# Patient Record
Sex: FEMALE | Race: OTHER | NOT HISPANIC OR LATINO
[De-identification: names, ages, dates, MRNs, and addresses within clinical notes are randomized per-mention and may not be internally consistent; named-entity substitution may affect disease eponyms.]

---

## 2019-05-01 PROBLEM — Z00.00 ENCOUNTER FOR PREVENTIVE HEALTH EXAMINATION: Status: ACTIVE | Noted: 2019-05-01

## 2019-05-31 ENCOUNTER — APPOINTMENT (OUTPATIENT)
Dept: NEUROLOGY | Facility: CLINIC | Age: 34
End: 2019-05-31
Payer: MEDICAID

## 2019-05-31 VITALS
BODY MASS INDEX: 31.82 KG/M2 | WEIGHT: 191 LBS | DIASTOLIC BLOOD PRESSURE: 80 MMHG | SYSTOLIC BLOOD PRESSURE: 130 MMHG | HEIGHT: 65 IN

## 2019-05-31 PROCEDURE — 99204 OFFICE O/P NEW MOD 45 MIN: CPT

## 2019-05-31 NOTE — HISTORY OF PRESENT ILLNESS
[FreeTextEntry1] : History and examination carried out with . Patient reports history of seizures going on for about the last year. She says she will bite her tounge and lose control of urine. She convulses. She has no recollection. Apparently she has had no workup and brings no records with her. She was put on Keppra thousand milligrams twice a day 3 months ago. She has had no further seizures except for a time when she ran out of the medication.No significant alcohol use. No drug use. Denies any other significant prior medical history.

## 2019-05-31 NOTE — CONSULT LETTER
[Dear  ___] : Dear  [unfilled], [Consult Letter:] : I had the pleasure of evaluating your patient, [unfilled]. [Please see my note below.] : Please see my note below. [Consult Closing:] : Thank you very much for allowing me to participate in the care of this patient.  If you have any questions, please do not hesitate to contact me. [Sincerely,] : Sincerely, [FreeTextEntry3] : Fco Louis MD, PhD, DPN-N\par [facility]\par Neurology [facility]\par Chief, Division of Neurology\par [facility]\par

## 2019-05-31 NOTE — REASON FOR VISIT
[Consultation] : a consultation visit [FreeTextEntry1] : Chief complaint: Seizures, Dr. Clare Salinas

## 2019-05-31 NOTE — PHYSICAL EXAM
[General Appearance - Alert] : alert [General Appearance - In No Acute Distress] : in no acute distress [General Appearance - Well-Appearing] : healthy appearing [Oriented To Time, Place, And Person] : oriented to person, place, and time [Impaired Insight] : insight and judgment were intact [Affect] : the affect was normal [Memory Recent] : recent memory was not impaired [Person] : oriented to person [Place] : oriented to place [Time] : oriented to time [Concentration Intact] : normal concentrating ability [Fluency] : fluency intact [Comprehension] : comprehension intact [Cranial Nerves Optic (II)] : visual acuity intact bilaterally,  visual fields full to confrontation, pupils equal round and reactive to light [Cranial Nerves Oculomotor (III)] : extraocular motion intact [Cranial Nerves Trigeminal (V)] : facial sensation intact symmetrically [Cranial Nerves Facial (VII)] : face symmetrical [Cranial Nerves Vestibulocochlear (VIII)] : hearing was intact bilaterally [Cranial Nerves Glossopharyngeal (IX)] : tongue and palate midline [Cranial Nerves Accessory (XI - Cranial And Spinal)] : head turning and shoulder shrug symmetric [Cranial Nerves Hypoglossal (XII)] : there was no tongue deviation with protrusion [Motor Tone] : muscle tone was normal in all four extremities [Motor Strength] : muscle strength was normal in all four extremities [No Muscle Atrophy] : normal bulk in all four extremities [Paresis Pronator Drift Right-Sided] : no pronator drift on the right [Paresis Pronator Drift Left-Sided] : no pronator drift on the left [Sensation Tactile Decrease] : light touch was intact [Sensation Pain / Temperature Decrease] : pain and temperature was intact [Romberg's Sign] : Romberg's sign was negtive [Abnormal Walk] : normal gait [Balance] : balance was intact [Past-pointing] : there was no past-pointing [Tremor] : no tremor present [Coordination - Dysmetria Impaired Finger-to-Nose Bilateral] : not present [Coordination - Dysmetria Impaired Heel-to-Shin Bilateral] : not present [2+] : Ankle jerk left 2+ [Plantar Reflex Right Only] : normal on the right [Plantar Reflex Left Only] : normal on the left [Edema] : there was no peripheral edema

## 2019-05-31 NOTE — ASSESSMENT
[FreeTextEntry1] : Seizure disorder. She's had no workup. We'll get brain MRI and an EEG. She will continue Keppra thousand milligrams twice a day. I have also told her to take daily folic acid, calcium and vitamin D. Followup in 6 weeks.

## 2019-06-11 ENCOUNTER — APPOINTMENT (OUTPATIENT)
Dept: NEUROLOGY | Facility: CLINIC | Age: 34
End: 2019-06-11
Payer: MEDICAID

## 2019-06-11 PROCEDURE — 93040 RHYTHM ECG WITH REPORT: CPT

## 2019-06-11 PROCEDURE — 95819 EEG AWAKE AND ASLEEP: CPT

## 2019-07-12 ENCOUNTER — APPOINTMENT (OUTPATIENT)
Dept: NEUROLOGY | Facility: CLINIC | Age: 34
End: 2019-07-12
Payer: MEDICAID

## 2019-07-12 VITALS
WEIGHT: 191 LBS | DIASTOLIC BLOOD PRESSURE: 80 MMHG | BODY MASS INDEX: 31.82 KG/M2 | SYSTOLIC BLOOD PRESSURE: 130 MMHG | HEIGHT: 65 IN

## 2019-07-12 PROCEDURE — 99214 OFFICE O/P EST MOD 30 MIN: CPT

## 2019-07-12 RX ORDER — LEVETIRACETAM 1000 MG/1
1000 TABLET, FILM COATED ORAL TWICE DAILY
Qty: 60 | Refills: 5 | Status: DISCONTINUED | COMMUNITY
Start: 2019-05-31 | End: 2019-07-12

## 2019-07-23 ENCOUNTER — RX RENEWAL (OUTPATIENT)
Age: 34
End: 2019-07-23

## 2019-08-23 ENCOUNTER — APPOINTMENT (OUTPATIENT)
Dept: NEUROLOGY | Facility: CLINIC | Age: 34
End: 2019-08-23
Payer: MEDICAID

## 2019-08-23 VITALS
DIASTOLIC BLOOD PRESSURE: 80 MMHG | HEIGHT: 65 IN | WEIGHT: 191 LBS | BODY MASS INDEX: 31.82 KG/M2 | SYSTOLIC BLOOD PRESSURE: 120 MMHG

## 2019-08-23 PROCEDURE — 99214 OFFICE O/P EST MOD 30 MIN: CPT

## 2019-08-29 ENCOUNTER — FORM ENCOUNTER (OUTPATIENT)
Age: 34
End: 2019-08-29

## 2019-08-30 ENCOUNTER — APPOINTMENT (OUTPATIENT)
Dept: MRI IMAGING | Facility: CLINIC | Age: 34
End: 2019-08-30
Payer: MEDICAID

## 2019-08-30 ENCOUNTER — OUTPATIENT (OUTPATIENT)
Dept: OUTPATIENT SERVICES | Facility: HOSPITAL | Age: 34
LOS: 1 days | End: 2019-08-30
Payer: MEDICAID

## 2019-08-30 DIAGNOSIS — R56.9 UNSPECIFIED CONVULSIONS: ICD-10-CM

## 2019-08-30 PROCEDURE — A9585: CPT

## 2019-08-30 PROCEDURE — 70553 MRI BRAIN STEM W/O & W/DYE: CPT

## 2019-08-30 PROCEDURE — 70553 MRI BRAIN STEM W/O & W/DYE: CPT | Mod: 26

## 2019-12-24 ENCOUNTER — EMERGENCY (EMERGENCY)
Facility: HOSPITAL | Age: 34
LOS: 1 days | Discharge: LEFT BEFORE DISCHARGE | End: 2019-12-24

## 2019-12-24 VITALS
WEIGHT: 164.91 LBS | HEIGHT: 64 IN | SYSTOLIC BLOOD PRESSURE: 125 MMHG | TEMPERATURE: 98 F | RESPIRATION RATE: 18 BRPM | DIASTOLIC BLOOD PRESSURE: 81 MMHG | OXYGEN SATURATION: 98 % | HEART RATE: 66 BPM

## 2019-12-24 NOTE — ED ADULT TRIAGE NOTE - HEIGHT IN CM
Patient called asking if NP Senia Treadwell will refill medication sertraline 100 mg that was previously being filled through [facility]. She states that the therapist left to practice else where and the psychiatrist is on medical leave and they dont know if he is coming back. Would like to have her sertraline filled through Senia Treadwell NP and per Senia Treadwell NP OK to do so. Requesting refill for sertraline 100 mg,  Last office visit: 8/8/18 with Senia Treadwell NP   Last ordered: 4/10/18 by outside provider. Refill set up for you to review and sign if appropriate.
Patient is requesting a call back from [facility] nurse. Patient denies symptoms. Further info was declined.
[phone]

## 2019-12-24 NOTE — ED ADULT TRIAGE NOTE - CHIEF COMPLAINT QUOTE
pt a+ox3, BIBA from [facility] c/o right senthil pain s/p fall this morning. pt reports visiting daughter at [facility], informed staff that right shoulder is bothering her and staff called 9-1-1. sling in place by staff at [facility].

## 2019-12-27 ENCOUNTER — APPOINTMENT (OUTPATIENT)
Dept: NEUROLOGY | Facility: CLINIC | Age: 34
End: 2019-12-27

## 2019-12-30 ENCOUNTER — APPOINTMENT (OUTPATIENT)
Dept: NEUROLOGY | Facility: CLINIC | Age: 34
End: 2019-12-30

## 2020-01-15 ENCOUNTER — APPOINTMENT (OUTPATIENT)
Dept: NEUROLOGY | Facility: CLINIC | Age: 35
End: 2020-01-15
Payer: MEDICAID

## 2020-01-15 VITALS
HEIGHT: 65 IN | SYSTOLIC BLOOD PRESSURE: 118 MMHG | WEIGHT: 192 LBS | DIASTOLIC BLOOD PRESSURE: 80 MMHG | BODY MASS INDEX: 31.99 KG/M2

## 2020-01-15 PROCEDURE — 99214 OFFICE O/P EST MOD 30 MIN: CPT

## 2020-02-19 ENCOUNTER — APPOINTMENT (OUTPATIENT)
Dept: NEUROLOGY | Facility: CLINIC | Age: 35
End: 2020-02-19
Payer: MEDICAID

## 2020-02-19 VITALS
HEIGHT: 65 IN | BODY MASS INDEX: 31.99 KG/M2 | DIASTOLIC BLOOD PRESSURE: 80 MMHG | SYSTOLIC BLOOD PRESSURE: 118 MMHG | WEIGHT: 192 LBS

## 2020-02-19 PROCEDURE — 99214 OFFICE O/P EST MOD 30 MIN: CPT

## 2020-02-19 RX ORDER — LEVETIRACETAM 750 MG/1
750 TABLET, FILM COATED ORAL TWICE DAILY
Qty: 120 | Refills: 5 | Status: DISCONTINUED | COMMUNITY
Start: 2019-07-12 | End: 2020-02-19

## 2020-03-25 ENCOUNTER — APPOINTMENT (OUTPATIENT)
Dept: NEUROLOGY | Facility: CLINIC | Age: 35
End: 2020-03-25
Payer: MEDICAID

## 2020-03-25 VITALS
SYSTOLIC BLOOD PRESSURE: 132 MMHG | WEIGHT: 192 LBS | HEIGHT: 65 IN | BODY MASS INDEX: 31.99 KG/M2 | DIASTOLIC BLOOD PRESSURE: 80 MMHG

## 2020-03-25 PROCEDURE — 99214 OFFICE O/P EST MOD 30 MIN: CPT

## 2020-08-31 ENCOUNTER — APPOINTMENT (OUTPATIENT)
Dept: NEUROLOGY | Facility: CLINIC | Age: 35
End: 2020-08-31
Payer: MEDICAID

## 2020-08-31 ENCOUNTER — RX RENEWAL (OUTPATIENT)
Age: 35
End: 2020-08-31

## 2020-08-31 VITALS
HEIGHT: 65 IN | DIASTOLIC BLOOD PRESSURE: 80 MMHG | BODY MASS INDEX: 35.16 KG/M2 | SYSTOLIC BLOOD PRESSURE: 130 MMHG | WEIGHT: 211 LBS | TEMPERATURE: 97.2 F

## 2020-08-31 DIAGNOSIS — R20.2 PARESTHESIA OF SKIN: ICD-10-CM

## 2020-08-31 PROCEDURE — 99215 OFFICE O/P EST HI 40 MIN: CPT

## 2020-10-23 ENCOUNTER — APPOINTMENT (OUTPATIENT)
Dept: NEUROLOGY | Facility: CLINIC | Age: 35
End: 2020-10-23
Payer: MEDICAID

## 2020-10-23 PROCEDURE — 95912 NRV CNDJ TEST 11-12 STUDIES: CPT

## 2020-10-23 PROCEDURE — 99072 ADDL SUPL MATRL&STAF TM PHE: CPT

## 2020-10-23 PROCEDURE — 95886 MUSC TEST DONE W/N TEST COMP: CPT

## 2021-03-01 ENCOUNTER — APPOINTMENT (OUTPATIENT)
Dept: NEUROLOGY | Facility: CLINIC | Age: 36
End: 2021-03-01

## 2021-12-22 ENCOUNTER — APPOINTMENT (OUTPATIENT)
Dept: UROLOGY | Facility: CLINIC | Age: 36
End: 2021-12-22

## 2023-05-11 ENCOUNTER — APPOINTMENT (OUTPATIENT)
Dept: UROLOGY | Facility: CLINIC | Age: 38
End: 2023-05-11
Payer: MEDICAID

## 2023-05-11 VITALS
SYSTOLIC BLOOD PRESSURE: 136 MMHG | WEIGHT: 266 LBS | HEIGHT: 65 IN | DIASTOLIC BLOOD PRESSURE: 92 MMHG | HEART RATE: 78 BPM | BODY MASS INDEX: 44.32 KG/M2

## 2023-05-11 DIAGNOSIS — R30.0 DYSURIA: ICD-10-CM

## 2023-05-11 DIAGNOSIS — Z87.42 PERSONAL HISTORY OF OTHER DISEASES OF THE FEMALE GENITAL TRACT: ICD-10-CM

## 2023-05-11 DIAGNOSIS — Z63.5 DISRUPTION OF FAMILY BY SEPARATION AND DIVORCE: ICD-10-CM

## 2023-05-11 DIAGNOSIS — K38.9 DISEASE OF APPENDIX, UNSPECIFIED: ICD-10-CM

## 2023-05-11 PROCEDURE — 99204 OFFICE O/P NEW MOD 45 MIN: CPT

## 2023-05-11 SDOH — SOCIAL STABILITY - SOCIAL INSECURITY: DISRUPTION OF FAMILY BY SEPARATION AND DIVORCE: Z63.5

## 2023-05-11 NOTE — HISTORY OF PRESENT ILLNESS
[FreeTextEntry1] : 36yo F hx of seizure presents with findings of 1.7cm sized upper pole stone in left kidney seen in CT w/ IV cont\par Pt went to hospital with complaints of dysuria, bilateral low back pain, fever, nausea and vomiting. \par She finished course of cefpodoxime x 1week today\par Ucx returned neg\par She currently still reports dysuria and nausea. Fever and pain resolved\par

## 2023-05-11 NOTE — LETTER BODY
[Dear  ___] : Dear  [unfilled], [Consult Letter:] : I had the pleasure of evaluating your patient, [unfilled]. [Please see my note below.] : Please see my note below. [Consult Closing:] : Thank you very much for allowing me to participate in the care of this patient.  If you have any questions, please do not hesitate to contact me. [Sincerely,] : Sincerely, [FreeTextEntry3] : Mendoza Doyle, \par Genitourinary Medicine\par [facility]\par

## 2023-05-11 NOTE — ASSESSMENT
[FreeTextEntry1] : possible pyelonephritis:\par prescribed another week of cefpodoxime \par return in 3 weeks. If symptoms persists, will consider cysto\par \par Kidney stone:\par will evaluate with KUB and CT \par f/u 3 weeks
Breath sounds clear and equal bilaterally.

## 2023-05-11 NOTE — PHYSICAL EXAM
[General Appearance - Well Developed] : well developed [General Appearance - Well Nourished] : well nourished [Normal Appearance] : normal appearance [Well Groomed] : well groomed [General Appearance - In No Acute Distress] : no acute distress [Edema] : no peripheral edema [] : no respiratory distress [Respiration, Rhythm And Depth] : normal respiratory rhythm and effort [Exaggerated Use Of Accessory Muscles For Inspiration] : no accessory muscle use [Normal Station and Gait] : the gait and station were normal for the patient's age [No Focal Deficits] : no focal deficits [Oriented To Time, Place, And Person] : oriented to person, place, and time [Affect] : the affect was normal [Mood] : the mood was normal [Not Anxious] : not anxious

## 2023-05-14 LAB — BACTERIA UR CULT: NORMAL

## 2023-06-03 ENCOUNTER — APPOINTMENT (OUTPATIENT)
Dept: CT IMAGING | Facility: CLINIC | Age: 38
End: 2023-06-03
Payer: MEDICAID

## 2023-06-03 ENCOUNTER — APPOINTMENT (OUTPATIENT)
Dept: RADIOLOGY | Facility: CLINIC | Age: 38
End: 2023-06-03
Payer: MEDICAID

## 2023-06-03 ENCOUNTER — OUTPATIENT (OUTPATIENT)
Dept: OUTPATIENT SERVICES | Facility: HOSPITAL | Age: 38
LOS: 1 days | End: 2023-06-03
Payer: MEDICAID

## 2023-06-03 DIAGNOSIS — N20.0 CALCULUS OF KIDNEY: ICD-10-CM

## 2023-06-03 PROCEDURE — 74018 RADEX ABDOMEN 1 VIEW: CPT

## 2023-06-03 PROCEDURE — 74176 CT ABD & PELVIS W/O CONTRAST: CPT

## 2023-06-03 PROCEDURE — 74018 RADEX ABDOMEN 1 VIEW: CPT | Mod: 26

## 2023-06-03 PROCEDURE — 74176 CT ABD & PELVIS W/O CONTRAST: CPT | Mod: 26

## 2023-06-05 ENCOUNTER — APPOINTMENT (OUTPATIENT)
Dept: UROLOGY | Facility: CLINIC | Age: 38
End: 2023-06-05

## 2023-06-08 ENCOUNTER — APPOINTMENT (OUTPATIENT)
Dept: UROLOGY | Facility: CLINIC | Age: 38
End: 2023-06-08
Payer: MEDICAID

## 2023-06-08 VITALS
DIASTOLIC BLOOD PRESSURE: 82 MMHG | HEART RATE: 82 BPM | HEIGHT: 65 IN | BODY MASS INDEX: 43.65 KG/M2 | SYSTOLIC BLOOD PRESSURE: 137 MMHG | WEIGHT: 262 LBS

## 2023-06-08 PROCEDURE — 99213 OFFICE O/P EST LOW 20 MIN: CPT

## 2023-06-08 NOTE — HISTORY OF PRESENT ILLNESS
[FreeTextEntry1] : 36yo F hx of seizure presents for f/u\par CT showed b/l staghorn calculus L>R\par She also finished course of abx for possible pyelonephritis. \par Ucx returned neg\par She still reports left flank pain and occasional vomiting. \par

## 2023-06-08 NOTE — ASSESSMENT
[FreeTextEntry1] : Staghorn Calculus:\par Left flank pain. \par will refer to Dr. Perkins for PCNL\par

## 2023-06-14 ENCOUNTER — APPOINTMENT (OUTPATIENT)
Dept: UROLOGY | Facility: CLINIC | Age: 38
End: 2023-06-14
Payer: MEDICAID

## 2023-06-14 PROCEDURE — 99215 OFFICE O/P EST HI 40 MIN: CPT

## 2023-06-15 NOTE — HISTORY OF PRESENT ILLNESS
[FreeTextEntry1] : 36 yo F for follow up\par saw Dr. Doyle\par interview performed with [place] interpretation \par \par generally healthy\par no previous history of nephrolithiasis\par recently started having some left flank pain intermittently\par reviewed last CT with the patient:\par - full staghorn in the left kidney with hydronephrosis in the upper calyx\par - large volume stone in the right kidney filling two calyces in the upper pole, no hydronephrosis\par \par discussed the findings, explained the mechanism of pain and that it may be from the upper calyx that is obstructed. will refrain from insertion of a nephrostomy tube for now, unless the pain becomes worse or the patient has fevers.\par \par discussed bilateral PCNL with the patient\par also discussed the need for multi-tract procedure on the left side and the possibility for a multi-stage procedure on the left.\par Procedure, in hospital stay and recovery discussed with the patient. Potential complications of bleeding, transfusion, selective angioembolization if indicated, adjacent organ injury, fever, sepsis, infection, incomplete stone clearance, bowel or other unusual injury. chest complications, vascular injuries, procedures needed to address any complications, ureteral injury, anesthetic complications, all discussed.\par \par Printed information including all of the above and more provided to the patient. \par \par plan:\par - left staged PCNL\par - right mini-PCNL

## 2023-06-15 NOTE — HISTORY OF PRESENT ILLNESS
[FreeTextEntry1] : 36 yo F for follow up\par saw Dr. Doyle\par interview performed with [place] interpretation \par \par generally healthy\par no previous history of nephrolithiasis\par recently started having some left flank pain intermittently\par reviewed last CT with the patient:\par - full staghorn in the left kidney with hydronephrosis in the upper calyx\par - large volume stone in the right kidney filling two calyces in the upper pole, no hydronephrosis\par \par discussed the findings, explained the mechanism of pain and that it may be from the upper calyx that is obstructed. will refrain from insertion of a nephrostomy tube for now, unless the pain becomes worse or the patient has fevers.\par \par discussed bilateral PCNL with the patient\par also discussed the need for multi-tract procedure on the left side and the possibility for a multi-stage procedure on the left.\par Procedure, in hospital stay and recovery discussed with the patient. Potential complications of bleeding, transfusion, selective angioembolization if indicated, adjacent organ injury, fever, sepsis, infection, incomplete stone clearance, bowel or other unusual injury. chest complications, vascular injuries, procedures needed to address any complications, ureteral injury, anesthetic complications, all discussed.\par \par Printed information including all of the above and more provided to the patient. \par \par plan:\par - left staged PCNL\par - right mini-PCNL

## 2023-06-16 ENCOUNTER — NON-APPOINTMENT (OUTPATIENT)
Age: 38
End: 2023-06-16

## 2023-07-12 ENCOUNTER — OUTPATIENT (OUTPATIENT)
Dept: OUTPATIENT SERVICES | Facility: HOSPITAL | Age: 38
LOS: 1 days | End: 2023-07-12
Payer: COMMERCIAL

## 2023-07-12 VITALS
WEIGHT: 270.95 LBS | OXYGEN SATURATION: 98 % | HEIGHT: 62.5 IN | RESPIRATION RATE: 20 BRPM | DIASTOLIC BLOOD PRESSURE: 100 MMHG | TEMPERATURE: 97 F | SYSTOLIC BLOOD PRESSURE: 140 MMHG | HEART RATE: 77 BPM

## 2023-07-12 DIAGNOSIS — Z29.9 ENCOUNTER FOR PROPHYLACTIC MEASURES, UNSPECIFIED: ICD-10-CM

## 2023-07-12 DIAGNOSIS — N20.0 CALCULUS OF KIDNEY: ICD-10-CM

## 2023-07-12 DIAGNOSIS — R56.9 UNSPECIFIED CONVULSIONS: ICD-10-CM

## 2023-07-12 DIAGNOSIS — Z01.818 ENCOUNTER FOR OTHER PREPROCEDURAL EXAMINATION: ICD-10-CM

## 2023-07-12 DIAGNOSIS — Z90.49 ACQUIRED ABSENCE OF OTHER SPECIFIED PARTS OF DIGESTIVE TRACT: Chronic | ICD-10-CM

## 2023-07-12 DIAGNOSIS — Z98.890 OTHER SPECIFIED POSTPROCEDURAL STATES: Chronic | ICD-10-CM

## 2023-07-12 DIAGNOSIS — Z13.89 ENCOUNTER FOR SCREENING FOR OTHER DISORDER: ICD-10-CM

## 2023-07-12 LAB
A1C WITH ESTIMATED AVERAGE GLUCOSE RESULT: 6 % — HIGH (ref 4–5.6)
ANION GAP SERPL CALC-SCNC: 16 MMOL/L — SIGNIFICANT CHANGE UP (ref 5–17)
APPEARANCE UR: ABNORMAL
APTT BLD: 33.2 SEC — SIGNIFICANT CHANGE UP (ref 27.5–35.5)
BACTERIA # UR AUTO: ABNORMAL
BASOPHILS # BLD AUTO: 0.05 K/UL — SIGNIFICANT CHANGE UP (ref 0–0.2)
BASOPHILS NFR BLD AUTO: 0.5 % — SIGNIFICANT CHANGE UP (ref 0–2)
BILIRUB UR-MCNC: NEGATIVE — SIGNIFICANT CHANGE UP
BLD GP AB SCN SERPL QL: SIGNIFICANT CHANGE UP
BUN SERPL-MCNC: 12.6 MG/DL — SIGNIFICANT CHANGE UP (ref 8–20)
CALCIUM SERPL-MCNC: 8.9 MG/DL — SIGNIFICANT CHANGE UP (ref 8.4–10.5)
CHLORIDE SERPL-SCNC: 102 MMOL/L — SIGNIFICANT CHANGE UP (ref 96–108)
CO2 SERPL-SCNC: 20 MMOL/L — LOW (ref 22–29)
COLOR SPEC: ABNORMAL
CREAT SERPL-MCNC: 0.87 MG/DL — SIGNIFICANT CHANGE UP (ref 0.5–1.3)
DIFF PNL FLD: ABNORMAL
EGFR: 87 ML/MIN/1.73M2 — SIGNIFICANT CHANGE UP
EOSINOPHIL # BLD AUTO: 0.3 K/UL — SIGNIFICANT CHANGE UP (ref 0–0.5)
EOSINOPHIL NFR BLD AUTO: 3.3 % — SIGNIFICANT CHANGE UP (ref 0–6)
EPI CELLS # UR: SIGNIFICANT CHANGE UP
ESTIMATED AVERAGE GLUCOSE: 126 MG/DL — HIGH (ref 68–114)
GLUCOSE SERPL-MCNC: 100 MG/DL — HIGH (ref 70–99)
GLUCOSE UR QL: NEGATIVE MG/DL — SIGNIFICANT CHANGE UP
GRAN CASTS # UR COMP ASSIST: ABNORMAL /LPF
HCG SERPL-ACNC: <4 MIU/ML — SIGNIFICANT CHANGE UP
HCT VFR BLD CALC: 43.6 % — SIGNIFICANT CHANGE UP (ref 34.5–45)
HGB BLD-MCNC: 14.4 G/DL — SIGNIFICANT CHANGE UP (ref 11.5–15.5)
HYALINE CASTS # UR AUTO: ABNORMAL /LPF
IMM GRANULOCYTES NFR BLD AUTO: 1 % — HIGH (ref 0–0.9)
INR BLD: 0.95 RATIO — SIGNIFICANT CHANGE UP (ref 0.88–1.16)
KETONES UR-MCNC: NEGATIVE — SIGNIFICANT CHANGE UP
LEUKOCYTE ESTERASE UR-ACNC: ABNORMAL
LYMPHOCYTES # BLD AUTO: 3.09 K/UL — SIGNIFICANT CHANGE UP (ref 1–3.3)
LYMPHOCYTES # BLD AUTO: 33.6 % — SIGNIFICANT CHANGE UP (ref 13–44)
MCHC RBC-ENTMCNC: 30.1 PG — SIGNIFICANT CHANGE UP (ref 27–34)
MCHC RBC-ENTMCNC: 33 GM/DL — SIGNIFICANT CHANGE UP (ref 32–36)
MCV RBC AUTO: 91.2 FL — SIGNIFICANT CHANGE UP (ref 80–100)
MONOCYTES # BLD AUTO: 0.41 K/UL — SIGNIFICANT CHANGE UP (ref 0–0.9)
MONOCYTES NFR BLD AUTO: 4.5 % — SIGNIFICANT CHANGE UP (ref 2–14)
NEUTROPHILS # BLD AUTO: 5.26 K/UL — SIGNIFICANT CHANGE UP (ref 1.8–7.4)
NEUTROPHILS NFR BLD AUTO: 57.1 % — SIGNIFICANT CHANGE UP (ref 43–77)
NITRITE UR-MCNC: NEGATIVE — SIGNIFICANT CHANGE UP
PH UR: 6 — SIGNIFICANT CHANGE UP (ref 5–8)
PLATELET # BLD AUTO: 345 K/UL — SIGNIFICANT CHANGE UP (ref 150–400)
POTASSIUM SERPL-MCNC: 4.3 MMOL/L — SIGNIFICANT CHANGE UP (ref 3.5–5.3)
POTASSIUM SERPL-SCNC: 4.3 MMOL/L — SIGNIFICANT CHANGE UP (ref 3.5–5.3)
PROT UR-MCNC: 30 MG/DL
PROTHROM AB SERPL-ACNC: 11 SEC — SIGNIFICANT CHANGE UP (ref 10.5–13.4)
RBC # BLD: 4.78 M/UL — SIGNIFICANT CHANGE UP (ref 3.8–5.2)
RBC # FLD: 13.7 % — SIGNIFICANT CHANGE UP (ref 10.3–14.5)
RBC CASTS # UR COMP ASSIST: >50 /HPF (ref 0–4)
SODIUM SERPL-SCNC: 138 MMOL/L — SIGNIFICANT CHANGE UP (ref 135–145)
SP GR SPEC: 1.01 — SIGNIFICANT CHANGE UP (ref 1.01–1.02)
T3 SERPL-MCNC: 127 NG/DL — SIGNIFICANT CHANGE UP (ref 80–200)
T4 AB SER-ACNC: 7.4 UG/DL — SIGNIFICANT CHANGE UP (ref 4.5–12)
TSH SERPL-MCNC: 4.26 UIU/ML — HIGH (ref 0.27–4.2)
UROBILINOGEN FLD QL: NEGATIVE MG/DL — SIGNIFICANT CHANGE UP
WBC # BLD: 9.2 K/UL — SIGNIFICANT CHANGE UP (ref 3.8–10.5)
WBC # FLD AUTO: 9.2 K/UL — SIGNIFICANT CHANGE UP (ref 3.8–10.5)
WBC UR QL: ABNORMAL /HPF (ref 0–5)

## 2023-07-12 PROCEDURE — 93005 ELECTROCARDIOGRAM TRACING: CPT

## 2023-07-12 PROCEDURE — 93010 ELECTROCARDIOGRAM REPORT: CPT

## 2023-07-12 PROCEDURE — G0463: CPT

## 2023-07-12 RX ORDER — CEFAZOLIN SODIUM 1 G
3000 VIAL (EA) INJECTION ONCE
Refills: 0 | Status: COMPLETED | OUTPATIENT
Start: 2023-08-01 | End: 2023-08-01

## 2023-07-12 RX ORDER — CEFAZOLIN SODIUM 1 G
3000 VIAL (EA) INJECTION ONCE
Refills: 0 | Status: COMPLETED | OUTPATIENT
Start: 2023-07-25 | End: 2023-07-25

## 2023-07-12 RX ORDER — ACETAMINOPHEN 500 MG
975 TABLET ORAL ONCE
Refills: 0 | Status: COMPLETED | OUTPATIENT
Start: 2023-07-25 | End: 2023-07-25

## 2023-07-12 RX ORDER — ACETAMINOPHEN 500 MG
975 TABLET ORAL ONCE
Refills: 0 | Status: COMPLETED | OUTPATIENT
Start: 2023-08-01 | End: 2023-08-01

## 2023-07-12 NOTE — H&P PST ADULT - ASSESSMENT
36 yo F PMH of seizures (on keppra and topiramate), presents with c/o kidney stones. Denies previous history of nephrolithiasis. Recently started having some left flank pain intermittently. 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    Patient was educated on preop preparation with written and verbal instructions. Pt was informed to obtain clearances >3 days before surgery. Pt was educated on NSAIDs, multivitamins and herbals that increase the risk of bleeding and need to be stopped 7 days before procedure. Pt verbalized understanding of the above.  38 yo F PMH of seizures (on keppra and topiramate), presents with c/o kidney stones. Denies previous history of nephrolithiasis. Recently started having some left flank pain intermittently. CT showed full staghorn in the left kidney with hydronephrosis in the upper calyx, large volume stone in the right kidney filling two calyces in the upper pole, no hydronephrosis. Preop assessment prior to left staged PCNL 2023, right mini-PCNL 2023 w/Dr Perkins    Patient was educated on preop preparation with written and verbal instructions. Pt was informed to obtain clearances >3 days before surgery. Pt was educated on NSAIDs, multivitamins and herbals that increase the risk of bleeding and need to be stopped 7 days before procedure. Pt verbalized understanding of the above.     OPIOID RISK TOOL    XAVIER EACH BOX THAT APPLIES AND ADD TOTALS AT THE END    FAMILY HISTORY OF SUBSTANCE ABUSE                 FEMALE         MALE                                                Alcohol                             [  ]1 pt          [  ]3pts                                               Illegal Durgs                     [  ]2 pts        [  ]3pts                                               Rx Drugs                           [  ]4 pts        [  ]4 pts    PERSONAL HISTORY OF SUBSTANCE ABUSE                                                                                          Alcohol                             [  ]3 pts       [  ]3 pts                                               Illegal Drugs                     [  ]4 pts        [  ]4 pts                                               Rx Drugs                           [  ]5 pts        [  ]5 pts    AGE BETWEEN 16-45 YEARS                                      [ x ]1 pt         [  ]1 pt    HISTORY OF PREADOLESCENT   SEXUAL ABUSE                                                             [  ]3 pts        [  ]0pts    PSYCHOLOGICAL DISEASE                     ADD, OCD, Bipolar, Schizophrenia        [  ]2 pts         [  ]2 pts                      Depression                                               [  ]1 pt           [  ]1 pt           SCORING TOTAL   (add numbers and type here)              ( 1 )                                     A score of 3 or lower indicated LOW risk for future opioid abuse  A score of 4 to 7 indicated moderate risk for future opioid abuse  A score of 8 or higher indicates a high risk for opioid abuse    CAPRINI VTE 2.0 SCORE [CLOT updated 2019]    AGE RELATED RISK FACTORS                                                       MOBILITY RELATED FACTORS  [ ] Age 41-60 years                                            (1 Point)                    [ ] Bed rest                                                        (1 Point)  [ ] Age: 61-74 years                                           (2 Points)                  [ ] Plaster cast                                                   (2 Points)  [ ] Age= 75 years                                              (3 Points)                    [ ] Bed bound for more than 72 hours                 (2 Points)    DISEASE RELATED RISK FACTORS                                               GENDER SPECIFIC FACTORS  [ ] Edema in the lower extremities                       (1 Point)              [ ] Pregnancy                                                     (1 Point)  [ ] Varicose veins                                               (1 Point)                     [ ] Post-partum < 6 weeks                                   (1 Point)             [ x] BMI > 25 Kg/m2                                            (1 Point)                     [ ] Hormonal therapy  or oral contraception          (1 Point)                 [ ] Sepsis (in the previous month)                        (1 Point)               [ ] History of pregnancy complications                 (1 point)  [ ] Pneumonia or serious lung disease                                               [ ] Unexplained or recurrent                     (1 Point)           (in the previous month)                               (1 Point)  [ ] Abnormal pulmonary function test                     (1 Point)                 SURGERY RELATED RISK FACTORS  [ ] Acute myocardial infarction                              (1 Point)               [ ]  Section                                             (1 Point)  [ ] Congestive heart failure (in the previous month)  (1 Point)      [ ] Minor surgery                                                  (1 Point)   [ ] Inflammatory bowel disease                             (1 Point)               [ ] Arthroscopic surgery                                        (2 Points)  [ ] Central venous access                                      (2 Points)                [ x] General surgery lasting more than 45 minutes (2 points)  [ ] Malignancy- Present or previous                   (2 Points)                [ ] Elective arthroplasty                                         (5 points)    [ ] Stroke (in the previous month)                          (5 Points)                                                                                                                                                           HEMATOLOGY RELATED FACTORS                                                 TRAUMA RELATED RISK FACTORS  [ ] Prior episodes of VTE                                     (3 Points)                [ ] Fracture of the hip, pelvis, or leg                       (5 Points)  [ ] Positive family history for VTE                         (3 Points)             [ ] Acute spinal cord injury (in the previous month)  (5 Points)  [ ] Prothrombin 79852 A                                     (3 Points)               [ ] Paralysis  (less than 1 month)                             (5 Points)  [ ] Factor V Leiden                                             (3 Points)                  [ ] Multiple Trauma within 1 month                        (5 Points)  [ ] Lupus anticoagulants                                     (3 Points)                                                           [ ] Anticardiolipin antibodies                               (3 Points)                                                       [ ] High homocysteine in the blood                      (3 Points)                                             [ ] Other congenital or acquired thrombophilia      (3 Points)                                                [ ] Heparin induced thrombocytopenia                  (3 Points)                                     Total Score [     3     ] 38 yo F PMH of hypothyroid, seizures, obesity, presents with c/o kidney stones. Denies previous history of nephrolithiasis. Recently started having some left flank pain intermittently. CT showed full staghorn in the left kidney with hydronephrosis in the upper calyx, large volume stone in the right kidney filling two calyces in the upper pole, no hydronephrosis. Preop assessment prior to left staged PCNL 2023, right mini-PCNL 2023 w/Dr Perkins    Patient was educated on preop preparation with written and verbal instructions. Pt was informed to obtain clearances >3 days before surgery. Pt was educated on NSAIDs, multivitamins and herbals that increase the risk of bleeding and need to be stopped 7 days before procedure. Pt verbalized understanding of the above.     OPIOID RISK TOOL    XAVIER EACH BOX THAT APPLIES AND ADD TOTALS AT THE END    FAMILY HISTORY OF SUBSTANCE ABUSE                 FEMALE         MALE                                                Alcohol                             [  ]1 pt          [  ]3pts                                               Illegal Durgs                     [  ]2 pts        [  ]3pts                                               Rx Drugs                           [  ]4 pts        [  ]4 pts    PERSONAL HISTORY OF SUBSTANCE ABUSE                                                                                          Alcohol                             [  ]3 pts       [  ]3 pts                                               Illegal Drugs                     [  ]4 pts        [  ]4 pts                                               Rx Drugs                           [  ]5 pts        [  ]5 pts    AGE BETWEEN 16-45 YEARS                                      [ x ]1 pt         [  ]1 pt    HISTORY OF PREADOLESCENT   SEXUAL ABUSE                                                             [  ]3 pts        [  ]0pts    PSYCHOLOGICAL DISEASE                     ADD, OCD, Bipolar, Schizophrenia        [  ]2 pts         [  ]2 pts                      Depression                                               [  ]1 pt           [  ]1 pt           SCORING TOTAL   (add numbers and type here)              ( 1 )                                     A score of 3 or lower indicated LOW risk for future opioid abuse  A score of 4 to 7 indicated moderate risk for future opioid abuse  A score of 8 or higher indicates a high risk for opioid abuse    CAPRINI VTE 2.0 SCORE [CLOT updated 2019]    AGE RELATED RISK FACTORS                                                       MOBILITY RELATED FACTORS  [ ] Age 41-60 years                                            (1 Point)                    [ ] Bed rest                                                        (1 Point)  [ ] Age: 61-74 years                                           (2 Points)                  [ ] Plaster cast                                                   (2 Points)  [ ] Age= 75 years                                              (3 Points)                    [ ] Bed bound for more than 72 hours                 (2 Points)    DISEASE RELATED RISK FACTORS                                               GENDER SPECIFIC FACTORS  [ ] Edema in the lower extremities                       (1 Point)              [ ] Pregnancy                                                     (1 Point)  [ ] Varicose veins                                               (1 Point)                     [ ] Post-partum < 6 weeks                                   (1 Point)             [ x] BMI > 25 Kg/m2                                            (1 Point)                     [ ] Hormonal therapy  or oral contraception          (1 Point)                 [ ] Sepsis (in the previous month)                        (1 Point)               [ ] History of pregnancy complications                 (1 point)  [ ] Pneumonia or serious lung disease                                               [ ] Unexplained or recurrent                     (1 Point)           (in the previous month)                               (1 Point)  [ ] Abnormal pulmonary function test                     (1 Point)                 SURGERY RELATED RISK FACTORS  [ ] Acute myocardial infarction                              (1 Point)               [ ]  Section                                             (1 Point)  [ ] Congestive heart failure (in the previous month)  (1 Point)      [ ] Minor surgery                                                  (1 Point)   [ ] Inflammatory bowel disease                             (1 Point)               [ ] Arthroscopic surgery                                        (2 Points)  [ ] Central venous access                                      (2 Points)                [ x] General surgery lasting more than 45 minutes (2 points)  [ ] Malignancy- Present or previous                   (2 Points)                [ ] Elective arthroplasty                                         (5 points)    [ ] Stroke (in the previous month)                          (5 Points)                                                                                                                                                           HEMATOLOGY RELATED FACTORS                                                 TRAUMA RELATED RISK FACTORS  [ ] Prior episodes of VTE                                     (3 Points)                [ ] Fracture of the hip, pelvis, or leg                       (5 Points)  [ ] Positive family history for VTE                         (3 Points)             [ ] Acute spinal cord injury (in the previous month)  (5 Points)  [ ] Prothrombin 30228 A                                     (3 Points)               [ ] Paralysis  (less than 1 month)                             (5 Points)  [ ] Factor V Leiden                                             (3 Points)                  [ ] Multiple Trauma within 1 month                        (5 Points)  [ ] Lupus anticoagulants                                     (3 Points)                                                           [ ] Anticardiolipin antibodies                               (3 Points)                                                       [ ] High homocysteine in the blood                      (3 Points)                                             [ ] Other congenital or acquired thrombophilia      (3 Points)                                                [ ] Heparin induced thrombocytopenia                  (3 Points)                                     Total Score [     3     ] 36 yo F PMH of hypothyroid, seizures (last seizure 2 yrs ago, currently not on any anti-seizure meds), obesity, presents with c/o kidney stones. Denies previous history of nephrolithiasis. Recently started having some left flank pain intermittently. CT showed full staghorn in the left kidney with hydronephrosis in the upper calyx, large volume stone in the right kidney filling two calyces in the upper pole, no hydronephrosis. Preop assessment prior to left PCNL 1st stage 2023, left PCNL 2nd stage 2023 w/Dr Perkins    Patient was educated on preop preparation with written and verbal instructions. Pt was informed to obtain clearance >3 days before surgery. Pt was educated on NSAIDs, multivitamins and herbals that increase the risk of bleeding and need to be stopped 7 days before procedure. Pt verbalized understanding of the above.     OPIOID RISK TOOL    XAVIER EACH BOX THAT APPLIES AND ADD TOTALS AT THE END    FAMILY HISTORY OF SUBSTANCE ABUSE                 FEMALE         MALE                                                Alcohol                             [  ]1 pt          [  ]3pts                                               Illegal Drugs                     [  ]2 pts        [  ]3pts                                               Rx Drugs                           [  ]4 pts        [  ]4 pts    PERSONAL HISTORY OF SUBSTANCE ABUSE                                                                                          Alcohol                             [  ]3 pts       [  ]3 pts                                               Illegal Drugs                     [  ]4 pts        [  ]4 pts                                               Rx Drugs                           [  ]5 pts        [  ]5 pts    AGE BETWEEN 16-45 YEARS                                      [ x ]1 pt         [  ]1 pt    HISTORY OF PREADOLESCENT   SEXUAL ABUSE                                                             [  ]3 pts        [  ]0pts    PSYCHOLOGICAL DISEASE                     ADD, OCD, Bipolar, Schizophrenia        [  ]2 pts         [  ]2 pts                      Depression                                               [  ]1 pt           [  ]1 pt           SCORING TOTAL   (add numbers and type here)              ( 1 )                                     A score of 3 or lower indicated LOW risk for future opioid abuse  A score of 4 to 7 indicated moderate risk for future opioid abuse  A score of 8 or higher indicates a high risk for opioid abuse    CAPRINI VTE 2.0 SCORE [CLOT updated 2019]    AGE RELATED RISK FACTORS                                                       MOBILITY RELATED FACTORS  [ ] Age 41-60 years                                            (1 Point)                    [ ] Bed rest                                                        (1 Point)  [ ] Age: 61-74 years                                           (2 Points)                  [ ] Plaster cast                                                   (2 Points)  [ ] Age= 75 years                                              (3 Points)                    [ ] Bed bound for more than 72 hours                 (2 Points)    DISEASE RELATED RISK FACTORS                                               GENDER SPECIFIC FACTORS  [ ] Edema in the lower extremities                       (1 Point)              [ ] Pregnancy                                                     (1 Point)  [ ] Varicose veins                                               (1 Point)                     [ ] Post-partum < 6 weeks                                   (1 Point)             [ x] BMI > 25 Kg/m2                                            (1 Point)                     [ ] Hormonal therapy  or oral contraception          (1 Point)                 [ ] Sepsis (in the previous month)                        (1 Point)               [ ] History of pregnancy complications                 (1 point)  [ ] Pneumonia or serious lung disease                                               [ ] Unexplained or recurrent                     (1 Point)           (in the previous month)                               (1 Point)  [ ] Abnormal pulmonary function test                     (1 Point)                 SURGERY RELATED RISK FACTORS  [ ] Acute myocardial infarction                              (1 Point)               [ ]  Section                                             (1 Point)  [ ] Congestive heart failure (in the previous month)  (1 Point)      [ ] Minor surgery                                                  (1 Point)   [ ] Inflammatory bowel disease                             (1 Point)               [ ] Arthroscopic surgery                                        (2 Points)  [ ] Central venous access                                      (2 Points)                [ x] General surgery lasting more than 45 minutes (2 points)  [ ] Malignancy- Present or previous                   (2 Points)                [ ] Elective arthroplasty                                         (5 points)    [ ] Stroke (in the previous month)                          (5 Points)                                                                                                                                                           HEMATOLOGY RELATED FACTORS                                                 TRAUMA RELATED RISK FACTORS  [ ] Prior episodes of VTE                                     (3 Points)                [ ] Fracture of the hip, pelvis, or leg                       (5 Points)  [ ] Positive family history for VTE                         (3 Points)             [ ] Acute spinal cord injury (in the previous month)  (5 Points)  [ ] Prothrombin 41365 A                                     (3 Points)               [ ] Paralysis  (less than 1 month)                             (5 Points)  [ ] Factor V Leiden                                             (3 Points)                  [ ] Multiple Trauma within 1 month                        (5 Points)  [ ] Lupus anticoagulants                                     (3 Points)                                                           [ ] Anticardiolipin antibodies                               (3 Points)                                                       [ ] High homocysteine in the blood                      (3 Points)                                             [ ] Other congenital or acquired thrombophilia      (3 Points)                                                [ ] Heparin induced thrombocytopenia                  (3 Points)                                     Total Score [     3     ] 38 yo F PMH of hypothyroid, DM2, seizures (last seizure 2 yrs ago, currently not on any anti-seizure meds), obesity, presents with c/o kidney stones. Denies previous history of nephrolithiasis. Pt reports she recently started having left flank pain that radiates to LUQ. She denies fevers, chills, nausea, vomiting, or any bowel or bladder issues. CT showed full staghorn in the left kidney with hydronephrosis in the upper calyx, large volume stone in the right kidney filling two calyces in the upper pole, no hydronephrosis. Preop assessment prior to left PCNL 1st stage 2023, left PCNL 2nd stage 2023 w/Dr Perkins    Patient was educated on preop preparation with written and verbal instructions. Pt was informed to obtain clearance >3 days before surgery. Pt was educated on NSAIDs, multivitamins and herbals that increase the risk of bleeding and need to be stopped 7 days before procedure. Pt verbalized understanding of the above.     OPIOID RISK TOOL    XAVIER EACH BOX THAT APPLIES AND ADD TOTALS AT THE END    FAMILY HISTORY OF SUBSTANCE ABUSE                 FEMALE         MALE                                                Alcohol                             [  ]1 pt          [  ]3pts                                               Illegal Drugs                     [  ]2 pts        [  ]3pts                                               Rx Drugs                           [  ]4 pts        [  ]4 pts    PERSONAL HISTORY OF SUBSTANCE ABUSE                                                                                          Alcohol                             [  ]3 pts       [  ]3 pts                                               Illegal Drugs                     [  ]4 pts        [  ]4 pts                                               Rx Drugs                           [  ]5 pts        [  ]5 pts    AGE BETWEEN 16-45 YEARS                                      [ x ]1 pt         [  ]1 pt    HISTORY OF PREADOLESCENT   SEXUAL ABUSE                                                             [  ]3 pts        [  ]0pts    PSYCHOLOGICAL DISEASE                     ADD, OCD, Bipolar, Schizophrenia        [  ]2 pts         [  ]2 pts                      Depression                                               [  ]1 pt           [  ]1 pt           SCORING TOTAL   (add numbers and type here)              ( 1 )                                     A score of 3 or lower indicated LOW risk for future opioid abuse  A score of 4 to 7 indicated moderate risk for future opioid abuse  A score of 8 or higher indicates a high risk for opioid abuse    CAPRINI VTE 2.0 SCORE [CLOT updated 2019]    AGE RELATED RISK FACTORS                                                       MOBILITY RELATED FACTORS  [ ] Age 41-60 years                                            (1 Point)                    [ ] Bed rest                                                        (1 Point)  [ ] Age: 61-74 years                                           (2 Points)                  [ ] Plaster cast                                                   (2 Points)  [ ] Age= 75 years                                              (3 Points)                    [ ] Bed bound for more than 72 hours                 (2 Points)    DISEASE RELATED RISK FACTORS                                               GENDER SPECIFIC FACTORS  [ ] Edema in the lower extremities                       (1 Point)              [ ] Pregnancy                                                     (1 Point)  [ ] Varicose veins                                               (1 Point)                     [ ] Post-partum < 6 weeks                                   (1 Point)             [ x] BMI > 25 Kg/m2                                            (1 Point)                     [ ] Hormonal therapy  or oral contraception          (1 Point)                 [ ] Sepsis (in the previous month)                        (1 Point)               [ ] History of pregnancy complications                 (1 point)  [ ] Pneumonia or serious lung disease                                               [ ] Unexplained or recurrent                     (1 Point)           (in the previous month)                               (1 Point)  [ ] Abnormal pulmonary function test                     (1 Point)                 SURGERY RELATED RISK FACTORS  [ ] Acute myocardial infarction                              (1 Point)               [ ]  Section                                             (1 Point)  [ ] Congestive heart failure (in the previous month)  (1 Point)      [ ] Minor surgery                                                  (1 Point)   [ ] Inflammatory bowel disease                             (1 Point)               [ ] Arthroscopic surgery                                        (2 Points)  [ ] Central venous access                                      (2 Points)                [ x] General surgery lasting more than 45 minutes (2 points)  [ ] Malignancy- Present or previous                   (2 Points)                [ ] Elective arthroplasty                                         (5 points)    [ ] Stroke (in the previous month)                          (5 Points)                                                                                                                                                           HEMATOLOGY RELATED FACTORS                                                 TRAUMA RELATED RISK FACTORS  [ ] Prior episodes of VTE                                     (3 Points)                [ ] Fracture of the hip, pelvis, or leg                       (5 Points)  [ ] Positive family history for VTE                         (3 Points)             [ ] Acute spinal cord injury (in the previous month)  (5 Points)  [ ] Prothrombin 71921 A                                     (3 Points)               [ ] Paralysis  (less than 1 month)                             (5 Points)  [ ] Factor V Leiden                                             (3 Points)                  [ ] Multiple Trauma within 1 month                        (5 Points)  [ ] Lupus anticoagulants                                     (3 Points)                                                           [ ] Anticardiolipin antibodies                               (3 Points)                                                       [ ] High homocysteine in the blood                      (3 Points)                                             [ ] Other congenital or acquired thrombophilia      (3 Points)                                                [ ] Heparin induced thrombocytopenia                  (3 Points)                                     Total Score [     3     ] 36 yo F PMH of hypothyroid, DM2, seizures (last seizure 2 yrs ago, currently not on any anti-seizure meds), obesity, presents with c/o kidney stones. Denies previous history of nephrolithiasis. Pt reports she recently started having left flank pain that radiates to LUQ. She denies fevers, chills, nausea, vomiting, or any bowel or bladder issues. CT showed full staghorn in the left kidney with hydronephrosis in the upper calyx, large volume stone in the right kidney filling two calyces in the upper pole, no hydronephrosis. Preop assessment prior to left PCNL 1st stage 2023, left PCNL 2nd stage 2023 w/Dr Perkins    Patient was educated on preop preparation with written and verbal instructions. Pt was informed to obtain clearance >3 days before surgery. Pt was educated on NSAIDs, multivitamins and herbals that increase the risk of bleeding and need to be stopped 7 days before procedure. Pt verbalized understanding of the above.     OPIOID RISK TOOL    XAVIER EACH BOX THAT APPLIES AND ADD TOTALS AT THE END    FAMILY HISTORY OF SUBSTANCE ABUSE                 FEMALE         MALE                                                Alcohol                             [  ]1 pt          [  ]3pts                                               Illegal Drugs                     [  ]2 pts        [  ]3pts                                               Rx Drugs                           [  ]4 pts        [  ]4 pts    PERSONAL HISTORY OF SUBSTANCE ABUSE                                                                                          Alcohol                             [  ]3 pts       [  ]3 pts                                               Illegal Drugs                     [  ]4 pts        [  ]4 pts                                               Rx Drugs                           [  ]5 pts        [  ]5 pts    AGE BETWEEN 16-45 YEARS                                      [ x ]1 pt         [  ]1 pt    HISTORY OF PREADOLESCENT   SEXUAL ABUSE                                                             [  ]3 pts        [  ]0pts    PSYCHOLOGICAL DISEASE                     ADD, OCD, Bipolar, Schizophrenia        [  ]2 pts         [  ]2 pts                      Depression                                               [  ]1 pt           [  ]1 pt           SCORING TOTAL   (add numbers and type here)              ( 1 )                                     A score of 3 or lower indicated LOW risk for future opioid abuse  A score of 4 to 7 indicated moderate risk for future opioid abuse  A score of 8 or higher indicates a high risk for opioid abuse    CAPRINI VTE 2.0 SCORE [CLOT updated 2019]    AGE RELATED RISK FACTORS                                                       MOBILITY RELATED FACTORS  [ ] Age 41-60 years                                            (1 Point)                    [ ] Bed rest                                                        (1 Point)  [ ] Age: 61-74 years                                           (2 Points)                  [ ] Plaster cast                                                   (2 Points)  [ ] Age= 75 years                                              (3 Points)                    [ ] Bed bound for more than 72 hours                 (2 Points)    DISEASE RELATED RISK FACTORS                                               GENDER SPECIFIC FACTORS  [ x] Edema in the lower extremities                       (1 Point)              [ ] Pregnancy                                                     (1 Point)  [ ] Varicose veins                                               (1 Point)                     [ ] Post-partum < 6 weeks                                   (1 Point)             [ x] BMI > 25 Kg/m2                                            (1 Point)                     [ ] Hormonal therapy  or oral contraception          (1 Point)                 [ ] Sepsis (in the previous month)                        (1 Point)               [ ] History of pregnancy complications                 (1 point)  [ ] Pneumonia or serious lung disease                                               [ ] Unexplained or recurrent                     (1 Point)           (in the previous month)                               (1 Point)  [ ] Abnormal pulmonary function test                     (1 Point)                 SURGERY RELATED RISK FACTORS  [ ] Acute myocardial infarction                              (1 Point)               [ ]  Section                                             (1 Point)  [ ] Congestive heart failure (in the previous month)  (1 Point)      [ ] Minor surgery                                                  (1 Point)   [ ] Inflammatory bowel disease                             (1 Point)               [ ] Arthroscopic surgery                                        (2 Points)  [ ] Central venous access                                      (2 Points)                [ x] General surgery lasting more than 45 minutes (2 points)  [ ] Malignancy- Present or previous                   (2 Points)                [ ] Elective arthroplasty                                         (5 points)    [ ] Stroke (in the previous month)                          (5 Points)                                                                                                                                                           HEMATOLOGY RELATED FACTORS                                                 TRAUMA RELATED RISK FACTORS  [ ] Prior episodes of VTE                                     (3 Points)                [ ] Fracture of the hip, pelvis, or leg                       (5 Points)  [ ] Positive family history for VTE                         (3 Points)             [ ] Acute spinal cord injury (in the previous month)  (5 Points)  [ ] Prothrombin 22256 A                                     (3 Points)               [ ] Paralysis  (less than 1 month)                             (5 Points)  [ ] Factor V Leiden                                             (3 Points)                  [ ] Multiple Trauma within 1 month                        (5 Points)  [ ] Lupus anticoagulants                                     (3 Points)                                                           [ ] Anticardiolipin antibodies                               (3 Points)                                                       [ ] High homocysteine in the blood                      (3 Points)                                             [ ] Other congenital or acquired thrombophilia      (3 Points)                                                [ ] Heparin induced thrombocytopenia                  (3 Points)                                     Total Score [     4     ]

## 2023-07-12 NOTE — H&P PST ADULT - NSICDXPASTMEDICALHX_GEN_ALL_CORE_FT
PAST MEDICAL HISTORY:  Calculus of kidney     Seizures      PAST MEDICAL HISTORY:  Calculus of kidney     History of seizures     Hypothyroid     Seizures     Severe obesity (BMI >= 40)      PAST MEDICAL HISTORY:  Calculus of kidney     DM (diabetes mellitus)     History of seizures     Hypothyroid     Severe obesity (BMI >= 40)

## 2023-07-12 NOTE — H&P PST ADULT - HISTORY OF PRESENT ILLNESS
38 yo F PMH of seizures (on keppra and topiramate), presents with c/o kidney stones. Denies previous history of nephrolithiasis. Recently started having some left flank pain intermittently. 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      plan:  - left staged PCNL  - right mini-PCNL    38 yo F PMH of seizures (on keppra and topiramate), presents with c/o kidney stones. Denies previous history of nephrolithiasis. Recently started having some left flank pain intermittently. CT showed full staghorn in the left kidney with hydronephrosis in the upper calyx, large volume stone in the right kidney filling two calyces in the upper pole, no hydronephrosis. Preop assessment prior to left staged PCNL 7/25/2023, right mini-PCNL 8/1/2023 w/Dr Perkins   38 yo F PMH of hypothyroid, seizures, obesity, presents with c/o kidney stones. Denies previous history of nephrolithiasis. Recently started having some left flank pain intermittently. CT showed full staghorn in the left kidney with hydronephrosis in the upper calyx, large volume stone in the right kidney filling two calyces in the upper pole, no hydronephrosis. Preop assessment prior to left staged PCNL 7/25/2023, right mini-PCNL 8/1/2023 w/Dr Perkins 38 yo F PMH of hypothyroid, DM2, seizures (last seizure 2 yrs ago, currently not on any anti-seizure meds), obesity, presents with c/o kidney stones. Denies previous history of nephrolithiasis. Recently started having some left flank pain intermittently. CT showed full staghorn in the left kidney with hydronephrosis in the upper calyx, large volume stone in the right kidney filling two calyces in the upper pole, no hydronephrosis. Preop assessment prior to left PCNL 1st stage 7/25/2023, left PCNL 2nd stage 7/27/2023 w/Dr Perkins 38 yo F PMH of hypothyroid, DM2, seizures (last seizure 2 yrs ago, currently not on any anti-seizure meds), obesity, presents with c/o kidney stones. Denies previous history of nephrolithiasis. Pt reports she recently started having left flank pain that radiates to LUQ. She denies fevers, chills, nausea, vomiting, or any bowel or bladder issues. CT showed full staghorn in the left kidney with hydronephrosis in the upper calyx, large volume stone in the right kidney filling two calyces in the upper pole, no hydronephrosis. Preop assessment prior to left PCNL 1st stage 7/25/2023, left PCNL 2nd stage 7/27/2023 w/Dr Perkins

## 2023-07-12 NOTE — H&P PST ADULT - NEGATIVE CARDIOVASCULAR SYMPTOMS
no chest pain/no palpitations/no orthopnea/no peripheral edema/no claudication no chest pain/no palpitations/no orthopnea/no claudication

## 2023-07-12 NOTE — H&P PST ADULT - CARDIOVASCULAR
regular rate and rhythm/S1 S2 present/no gallops/no rub/no murmur/no JVD/no pedal edema details… regular rate and rhythm/S1 S2 present/no gallops/no rub/no murmur/no JVD/pedal edema

## 2023-07-12 NOTE — H&P PST ADULT - PROBLEM SELECTOR PLAN 3
caprini score 3, moderate risk for dvt, SCD ordered, surgical team to assess for dvt prophylaxis ort score 1, low risk for substance abuse

## 2023-07-12 NOTE — H&P PST ADULT - MUSCULOSKELETAL
ROM intact/no calf tenderness/normal gait/strength 5/5 bilateral upper extremities/strength 5/5 bilateral lower extremities negative ROM intact/no calf tenderness/normal gait/strength 5/5 bilateral upper extremities/strength 5/5 bilateral lower extremities/back exam

## 2023-07-12 NOTE — H&P PST ADULT - PROBLEM SELECTOR PLAN 2
caprini score 3, moderate risk for dvt, SCD ordered, surgical team to assess for dvt prophylaxis caprini score 4, moderate risk for dvt, SCD ordered, surgical team to assess for dvt prophylaxis

## 2023-07-12 NOTE — H&P PST ADULT - NSANTHOSAYNRD_GEN_A_CORE
No. CLOVIS screening performed.  STOP BANG Legend: 0-2 = LOW Risk; 3-4 = INTERMEDIATE Risk; 5-8 = HIGH Risk

## 2023-07-12 NOTE — H&P PST ADULT - GASTROINTESTINAL
nontender/normal active bowel sounds/distended details… soft/nondistended/normal active bowel sounds/tender

## 2023-07-12 NOTE — H&P PST ADULT - PROBLEM SELECTOR PLAN 1
preop assessment, medical clearance pending, left staged PCNL 7/25/2023, right mini-PCNL 8/1/2023 w/Dr Perkins preop assessment, medical clearance pending, left PCNL 1st stage 7/25/2023, left PCNL 2nd stage 7/27/2023 w/Dr Perkins

## 2023-07-13 LAB
CULTURE RESULTS: SIGNIFICANT CHANGE UP
SPECIMEN SOURCE: SIGNIFICANT CHANGE UP

## 2023-07-14 ENCOUNTER — NON-APPOINTMENT (OUTPATIENT)
Age: 38
End: 2023-07-14

## 2023-07-17 ENCOUNTER — OUTPATIENT (OUTPATIENT)
Dept: OUTPATIENT SERVICES | Facility: HOSPITAL | Age: 38
LOS: 1 days | End: 2023-07-17
Payer: COMMERCIAL

## 2023-07-17 DIAGNOSIS — Z90.49 ACQUIRED ABSENCE OF OTHER SPECIFIED PARTS OF DIGESTIVE TRACT: Chronic | ICD-10-CM

## 2023-07-17 DIAGNOSIS — Z98.890 OTHER SPECIFIED POSTPROCEDURAL STATES: Chronic | ICD-10-CM

## 2023-07-17 DIAGNOSIS — Z01.812 ENCOUNTER FOR PREPROCEDURAL LABORATORY EXAMINATION: ICD-10-CM

## 2023-07-17 LAB
APPEARANCE UR: CLEAR — SIGNIFICANT CHANGE UP
BACTERIA # UR AUTO: ABNORMAL
BILIRUB UR-MCNC: NEGATIVE — SIGNIFICANT CHANGE UP
COLOR SPEC: YELLOW — SIGNIFICANT CHANGE UP
DIFF PNL FLD: ABNORMAL
EPI CELLS # UR: SIGNIFICANT CHANGE UP
GLUCOSE UR QL: NEGATIVE MG/DL — SIGNIFICANT CHANGE UP
KETONES UR-MCNC: NEGATIVE — SIGNIFICANT CHANGE UP
LEUKOCYTE ESTERASE UR-ACNC: ABNORMAL
NITRITE UR-MCNC: NEGATIVE — SIGNIFICANT CHANGE UP
PH UR: 6 — SIGNIFICANT CHANGE UP (ref 5–8)
PROT UR-MCNC: NEGATIVE — SIGNIFICANT CHANGE UP
RBC CASTS # UR COMP ASSIST: SIGNIFICANT CHANGE UP /HPF (ref 0–4)
SP GR SPEC: 1.01 — SIGNIFICANT CHANGE UP (ref 1.01–1.02)
UROBILINOGEN FLD QL: NEGATIVE MG/DL — SIGNIFICANT CHANGE UP
WBC UR QL: SIGNIFICANT CHANGE UP /HPF (ref 0–5)

## 2023-07-19 DIAGNOSIS — N39.0 URINARY TRACT INFECTION, SITE NOT SPECIFIED: ICD-10-CM

## 2023-07-19 LAB
CULTURE RESULTS: SIGNIFICANT CHANGE UP
SPECIMEN SOURCE: SIGNIFICANT CHANGE UP

## 2023-07-24 ENCOUNTER — TRANSCRIPTION ENCOUNTER (OUTPATIENT)
Age: 38
End: 2023-07-24

## 2023-07-24 NOTE — ASU PATIENT PROFILE, ADULT - NSICDXPASTMEDICALHX_GEN_ALL_CORE_FT
PAST MEDICAL HISTORY:  Calculus of kidney     DM (diabetes mellitus)     History of seizures     Hypothyroid     Severe obesity (BMI >= 40)

## 2023-07-25 ENCOUNTER — RESULT REVIEW (OUTPATIENT)
Age: 38
End: 2023-07-25

## 2023-07-25 ENCOUNTER — APPOINTMENT (OUTPATIENT)
Dept: UROLOGY | Facility: HOSPITAL | Age: 38
End: 2023-07-25
Payer: MEDICAID

## 2023-07-25 ENCOUNTER — INPATIENT (INPATIENT)
Facility: HOSPITAL | Age: 38
LOS: 4 days | Discharge: HOME | DRG: 660 | End: 2023-07-30
Attending: STUDENT IN AN ORGANIZED HEALTH CARE EDUCATION/TRAINING PROGRAM | Admitting: STUDENT IN AN ORGANIZED HEALTH CARE EDUCATION/TRAINING PROGRAM
Payer: COMMERCIAL

## 2023-07-25 VITALS
HEART RATE: 79 BPM | HEIGHT: 62.5 IN | TEMPERATURE: 98 F | WEIGHT: 270.95 LBS | OXYGEN SATURATION: 99 % | SYSTOLIC BLOOD PRESSURE: 142 MMHG | RESPIRATION RATE: 16 BRPM | DIASTOLIC BLOOD PRESSURE: 79 MMHG

## 2023-07-25 DIAGNOSIS — N20.0 CALCULUS OF KIDNEY: ICD-10-CM

## 2023-07-25 DIAGNOSIS — Z98.890 OTHER SPECIFIED POSTPROCEDURAL STATES: Chronic | ICD-10-CM

## 2023-07-25 DIAGNOSIS — Z90.49 ACQUIRED ABSENCE OF OTHER SPECIFIED PARTS OF DIGESTIVE TRACT: Chronic | ICD-10-CM

## 2023-07-25 LAB
ANION GAP SERPL CALC-SCNC: 16 MMOL/L — SIGNIFICANT CHANGE UP (ref 5–17)
BUN SERPL-MCNC: 14.1 MG/DL — SIGNIFICANT CHANGE UP (ref 8–20)
CALCIUM SERPL-MCNC: 8.3 MG/DL — LOW (ref 8.4–10.5)
CHLORIDE SERPL-SCNC: 107 MMOL/L — SIGNIFICANT CHANGE UP (ref 96–108)
CO2 SERPL-SCNC: 18 MMOL/L — LOW (ref 22–29)
CREAT SERPL-MCNC: 0.86 MG/DL — SIGNIFICANT CHANGE UP (ref 0.5–1.3)
EGFR: 88 ML/MIN/1.73M2 — SIGNIFICANT CHANGE UP
GLUCOSE BLDC GLUCOMTR-MCNC: 116 MG/DL — HIGH (ref 70–99)
GLUCOSE BLDC GLUCOMTR-MCNC: 83 MG/DL — SIGNIFICANT CHANGE UP (ref 70–99)
GLUCOSE SERPL-MCNC: 115 MG/DL — HIGH (ref 70–99)
HCT VFR BLD CALC: 40.2 % — SIGNIFICANT CHANGE UP (ref 34.5–45)
HGB BLD-MCNC: 13.3 G/DL — SIGNIFICANT CHANGE UP (ref 11.5–15.5)
MAGNESIUM SERPL-MCNC: 2 MG/DL — SIGNIFICANT CHANGE UP (ref 1.6–2.6)
MCHC RBC-ENTMCNC: 30.6 PG — SIGNIFICANT CHANGE UP (ref 27–34)
MCHC RBC-ENTMCNC: 33.1 GM/DL — SIGNIFICANT CHANGE UP (ref 32–36)
MCV RBC AUTO: 92.6 FL — SIGNIFICANT CHANGE UP (ref 80–100)
PHOSPHATE SERPL-MCNC: 3.6 MG/DL — SIGNIFICANT CHANGE UP (ref 2.4–4.7)
PLATELET # BLD AUTO: 306 K/UL — SIGNIFICANT CHANGE UP (ref 150–400)
POTASSIUM SERPL-MCNC: 4 MMOL/L — SIGNIFICANT CHANGE UP (ref 3.5–5.3)
POTASSIUM SERPL-SCNC: 4 MMOL/L — SIGNIFICANT CHANGE UP (ref 3.5–5.3)
RBC # BLD: 4.34 M/UL — SIGNIFICANT CHANGE UP (ref 3.8–5.2)
RBC # FLD: 13.4 % — SIGNIFICANT CHANGE UP (ref 10.3–14.5)
SODIUM SERPL-SCNC: 141 MMOL/L — SIGNIFICANT CHANGE UP (ref 135–145)
WBC # BLD: 9.66 K/UL — SIGNIFICANT CHANGE UP (ref 3.8–10.5)
WBC # FLD AUTO: 9.66 K/UL — SIGNIFICANT CHANGE UP (ref 3.8–10.5)

## 2023-07-25 PROCEDURE — 71045 X-RAY EXAM CHEST 1 VIEW: CPT | Mod: 26

## 2023-07-25 PROCEDURE — 50081 PERQ NL/PL LITHOTRP CPLX>2CM: CPT | Mod: LT,22

## 2023-07-25 PROCEDURE — 88300 SURGICAL PATH GROSS: CPT | Mod: 26

## 2023-07-25 DEVICE — NEPHROSTOMY BALLOON CATH NEPHROMAX 24FR 17CM PTFE: Type: IMPLANTABLE DEVICE | Status: FUNCTIONAL

## 2023-07-25 DEVICE — FLOSEAL FAST PREP 10ML: Type: IMPLANTABLE DEVICE | Status: FUNCTIONAL

## 2023-07-25 RX ORDER — HYDROMORPHONE HYDROCHLORIDE 2 MG/ML
1 INJECTION INTRAMUSCULAR; INTRAVENOUS; SUBCUTANEOUS
Refills: 0 | Status: DISCONTINUED | OUTPATIENT
Start: 2023-07-25 | End: 2023-07-25

## 2023-07-25 RX ORDER — OXYCODONE HYDROCHLORIDE 5 MG/1
5 TABLET ORAL EVERY 4 HOURS
Refills: 0 | Status: DISCONTINUED | OUTPATIENT
Start: 2023-07-25 | End: 2023-07-27

## 2023-07-25 RX ORDER — ONDANSETRON 8 MG/1
4 TABLET, FILM COATED ORAL ONCE
Refills: 0 | Status: DISCONTINUED | OUTPATIENT
Start: 2023-07-25 | End: 2023-07-25

## 2023-07-25 RX ORDER — HEPARIN SODIUM 5000 [USP'U]/ML
7500 INJECTION INTRAVENOUS; SUBCUTANEOUS EVERY 8 HOURS
Refills: 0 | Status: DISCONTINUED | OUTPATIENT
Start: 2023-07-26 | End: 2023-07-27

## 2023-07-25 RX ORDER — HYDROMORPHONE HYDROCHLORIDE 2 MG/ML
0.5 INJECTION INTRAMUSCULAR; INTRAVENOUS; SUBCUTANEOUS
Refills: 0 | Status: DISCONTINUED | OUTPATIENT
Start: 2023-07-25 | End: 2023-07-25

## 2023-07-25 RX ORDER — SODIUM CHLORIDE 9 MG/ML
1000 INJECTION, SOLUTION INTRAVENOUS
Refills: 0 | Status: DISCONTINUED | OUTPATIENT
Start: 2023-07-25 | End: 2023-07-27

## 2023-07-25 RX ORDER — CEFAZOLIN SODIUM 1 G
3000 VIAL (EA) INJECTION EVERY 8 HOURS
Refills: 0 | Status: DISCONTINUED | OUTPATIENT
Start: 2023-07-25 | End: 2023-07-26

## 2023-07-25 RX ORDER — OXYCODONE HYDROCHLORIDE 5 MG/1
10 TABLET ORAL EVERY 4 HOURS
Refills: 0 | Status: DISCONTINUED | OUTPATIENT
Start: 2023-07-25 | End: 2023-07-27

## 2023-07-25 RX ORDER — HEPARIN SODIUM 5000 [USP'U]/ML
5000 INJECTION INTRAVENOUS; SUBCUTANEOUS EVERY 8 HOURS
Refills: 0 | Status: DISCONTINUED | OUTPATIENT
Start: 2023-07-25 | End: 2023-07-25

## 2023-07-25 RX ORDER — SODIUM CHLORIDE 9 MG/ML
1000 INJECTION, SOLUTION INTRAVENOUS
Refills: 0 | Status: DISCONTINUED | OUTPATIENT
Start: 2023-07-25 | End: 2023-07-25

## 2023-07-25 RX ADMIN — SODIUM CHLORIDE 100 MILLILITER(S): 9 INJECTION, SOLUTION INTRAVENOUS at 22:49

## 2023-07-25 RX ADMIN — HEPARIN SODIUM 5000 UNIT(S): 5000 INJECTION INTRAVENOUS; SUBCUTANEOUS at 22:17

## 2023-07-25 RX ADMIN — HYDROMORPHONE HYDROCHLORIDE 0.5 MILLIGRAM(S): 2 INJECTION INTRAMUSCULAR; INTRAVENOUS; SUBCUTANEOUS at 20:39

## 2023-07-25 RX ADMIN — Medication 975 MILLIGRAM(S): at 13:40

## 2023-07-25 RX ADMIN — Medication 100 MILLIGRAM(S): at 22:51

## 2023-07-25 RX ADMIN — HYDROMORPHONE HYDROCHLORIDE 0.5 MILLIGRAM(S): 2 INJECTION INTRAMUSCULAR; INTRAVENOUS; SUBCUTANEOUS at 20:19

## 2023-07-25 RX ADMIN — Medication 200 MILLIGRAM(S): at 17:00

## 2023-07-25 NOTE — PATIENT PROFILE ADULT - INTERPRETER'S NAME
Subjective:      Patient ID: Oscar Glover is a 16 y.o. female.    Chief Complaint: Follow-up (F/u IM left ezequiel sx 7/24/22-10/22/22, pt states everything has been going good, has no complaints of pain, pt states finished with the bending aspect of therapy, states therapy has been going well, pt states she is ready to start walking again,)    Date of operative procedure: 7/24/22    Procedures:  1. Irrigation and debridement of left open femur fracture   2. Irrigation and debridement of left open calcaneus and cuboid fractures  2. Intramedullary treatment of left femoral shaft fracture  3. Closed management of left calcaneus and cuboid fractures    Oscar Glover returns to the clinic today for her 2nd post-operative examination. She is accompanied by her mother. Pain is controlled. No sensory changes distally. She is ambulating with crutches today. Compliant with boot and NWB precautions. Wounds have healed without issue. No drainage. No f/c/ns. She was compliant with lovenox. She has been working with outpatient therapy. She is eager to progress WB.       Objective:      Gen: NAD  Head: Normocephalic, without obvious abnormality, atraumatic  Eyes: conjunctivae/corneas clear. EOM's intact  Ears: normal external appearance  Nose: Nares normal. Septum midline. Mucosa normal. No drainage  Throat: normal findings: lips normal without lesions  Lungs: unlabored breathing on room air  Chest wall: symmetric chest rise  Heart: regular rate and rhythm  Pulses: 2+ and symmetric  Skin: Skin color, texture, turgor normal. No rashes or lesions  Neurologic: Grossly normal     Focused exam of the left lower extremity reveals skin is intact.  Surgical incisions are well healed.  Traumatic wounds are well healed.  No drainage.  No erythema, fluctuance, or induration. No ttp.  Full and painless ROM of the hip and knee. She is able to perform straight leg raise with no lag.  She has full extension to 130° of flexion at the knee.   20 degrees of dorsiflexion and 50 degrees of plantarflexion. Palpable DP pulse and foot is warm and well perfused. Foot has no significant swelling.  Sensation is intact to light touch to tibial/superficial peroneal/deep peroneal nerve distributions. 5/5 ehl/fhl/ta/gs/knee flexion and extension/abductors/adductors.  No pain out of proportion to expectation.  No excessive pain with passive stretch of muscles in any compartment.  Temperature and color of extremity appropriate.  Brisk capillary refill in all digits.  Compartments compressible without evidence of excessive firmness. No signs of DVT.     Warm well perfused lower extremity with capillary refill less than 2 seconds and sensation intact to light touch in the terminal nerve distributions. Calf soft and easily compressible without clinical sign of DVT. No palpable popliteal lymphadenopathy        Assessment:       Encounter Diagnosis   Name Primary?    Aftercare Yes     Imaging:   Two views of the left femur obtained today personally reviewed showing postoperative changes of intramedullary nailing of left femoral shaft fracture.  Hardware is stable with no acute postop complication.  Alignment unchanged.  Minimal callus.     Three-view radiographs of the left foot obtained today personally reviewed showing stable appearance of foot fractures with maintained alignment and no adverse changes.      Plan:       Oscar marlow was seen today for follow-up.    Diagnoses and all orders for this visit:    Aftercare  -     X-Ray Foot Complete Left; Future  -     X-Ray Femur 2 View Left; Future    Imaging and exam findings discussed with the patient and her mother.  Wounds are well healed.  Progress WB status to full in boot. Continue outpatient PT.  OTC pain medication as needed.    Follow-up: Oscar kashmir Adalberto to follow up in 6 weeks with repeat radiographs of the foot and femur. If there are any questions prior to this, the patient was instructed to contact the office.      Jose Maria

## 2023-07-25 NOTE — PATIENT PROFILE ADULT - FALL HARM RISK - HARM RISK INTERVENTIONS

## 2023-07-26 ENCOUNTER — TRANSCRIPTION ENCOUNTER (OUTPATIENT)
Age: 38
End: 2023-07-26

## 2023-07-26 LAB
ANION GAP SERPL CALC-SCNC: 13 MMOL/L — SIGNIFICANT CHANGE UP (ref 5–17)
BUN SERPL-MCNC: 14.1 MG/DL — SIGNIFICANT CHANGE UP (ref 8–20)
CALCIUM SERPL-MCNC: 8.3 MG/DL — LOW (ref 8.4–10.5)
CHLORIDE SERPL-SCNC: 106 MMOL/L — SIGNIFICANT CHANGE UP (ref 96–108)
CO2 SERPL-SCNC: 19 MMOL/L — LOW (ref 22–29)
CREAT SERPL-MCNC: 0.81 MG/DL — SIGNIFICANT CHANGE UP (ref 0.5–1.3)
EGFR: 95 ML/MIN/1.73M2 — SIGNIFICANT CHANGE UP
GLUCOSE SERPL-MCNC: 117 MG/DL — HIGH (ref 70–99)
HCT VFR BLD CALC: 37.3 % — SIGNIFICANT CHANGE UP (ref 34.5–45)
HGB BLD-MCNC: 12.5 G/DL — SIGNIFICANT CHANGE UP (ref 11.5–15.5)
MAGNESIUM SERPL-MCNC: 2 MG/DL — SIGNIFICANT CHANGE UP (ref 1.6–2.6)
MCHC RBC-ENTMCNC: 30.8 PG — SIGNIFICANT CHANGE UP (ref 27–34)
MCHC RBC-ENTMCNC: 33.5 GM/DL — SIGNIFICANT CHANGE UP (ref 32–36)
MCV RBC AUTO: 91.9 FL — SIGNIFICANT CHANGE UP (ref 80–100)
PHOSPHATE SERPL-MCNC: 2.9 MG/DL — SIGNIFICANT CHANGE UP (ref 2.4–4.7)
PLATELET # BLD AUTO: 299 K/UL — SIGNIFICANT CHANGE UP (ref 150–400)
POTASSIUM SERPL-MCNC: 4.1 MMOL/L — SIGNIFICANT CHANGE UP (ref 3.5–5.3)
POTASSIUM SERPL-SCNC: 4.1 MMOL/L — SIGNIFICANT CHANGE UP (ref 3.5–5.3)
RBC # BLD: 4.06 M/UL — SIGNIFICANT CHANGE UP (ref 3.8–5.2)
RBC # FLD: 13.2 % — SIGNIFICANT CHANGE UP (ref 10.3–14.5)
SODIUM SERPL-SCNC: 138 MMOL/L — SIGNIFICANT CHANGE UP (ref 135–145)
WBC # BLD: 13.56 K/UL — HIGH (ref 3.8–10.5)
WBC # FLD AUTO: 13.56 K/UL — HIGH (ref 3.8–10.5)

## 2023-07-26 PROCEDURE — 74176 CT ABD & PELVIS W/O CONTRAST: CPT | Mod: 26

## 2023-07-26 RX ORDER — CEFAZOLIN SODIUM 1 G
3000 VIAL (EA) INJECTION EVERY 8 HOURS
Refills: 0 | Status: DISCONTINUED | OUTPATIENT
Start: 2023-07-26 | End: 2023-07-27

## 2023-07-26 RX ORDER — LEVOTHYROXINE SODIUM 125 MCG
25 TABLET ORAL DAILY
Refills: 0 | Status: DISCONTINUED | OUTPATIENT
Start: 2023-07-26 | End: 2023-07-27

## 2023-07-26 RX ORDER — KETOROLAC TROMETHAMINE 30 MG/ML
15 SYRINGE (ML) INJECTION EVERY 6 HOURS
Refills: 0 | Status: DISCONTINUED | OUTPATIENT
Start: 2023-07-26 | End: 2023-07-27

## 2023-07-26 RX ORDER — ACETAMINOPHEN 500 MG
975 TABLET ORAL EVERY 6 HOURS
Refills: 0 | Status: DISCONTINUED | OUTPATIENT
Start: 2023-07-26 | End: 2023-07-27

## 2023-07-26 RX ADMIN — HEPARIN SODIUM 7500 UNIT(S): 5000 INJECTION INTRAVENOUS; SUBCUTANEOUS at 21:57

## 2023-07-26 RX ADMIN — Medication 975 MILLIGRAM(S): at 18:03

## 2023-07-26 RX ADMIN — SODIUM CHLORIDE 100 MILLILITER(S): 9 INJECTION, SOLUTION INTRAVENOUS at 05:53

## 2023-07-26 RX ADMIN — Medication 200 MILLIGRAM(S): at 21:56

## 2023-07-26 RX ADMIN — OXYCODONE HYDROCHLORIDE 10 MILLIGRAM(S): 5 TABLET ORAL at 22:55

## 2023-07-26 RX ADMIN — OXYCODONE HYDROCHLORIDE 10 MILLIGRAM(S): 5 TABLET ORAL at 02:51

## 2023-07-26 RX ADMIN — Medication 100 MILLIGRAM(S): at 05:52

## 2023-07-26 RX ADMIN — Medication 25 MICROGRAM(S): at 06:10

## 2023-07-26 RX ADMIN — OXYCODONE HYDROCHLORIDE 10 MILLIGRAM(S): 5 TABLET ORAL at 01:51

## 2023-07-26 RX ADMIN — Medication 975 MILLIGRAM(S): at 06:09

## 2023-07-26 RX ADMIN — Medication 975 MILLIGRAM(S): at 14:50

## 2023-07-26 RX ADMIN — HEPARIN SODIUM 7500 UNIT(S): 5000 INJECTION INTRAVENOUS; SUBCUTANEOUS at 13:50

## 2023-07-26 RX ADMIN — OXYCODONE HYDROCHLORIDE 10 MILLIGRAM(S): 5 TABLET ORAL at 21:57

## 2023-07-26 RX ADMIN — Medication 975 MILLIGRAM(S): at 06:11

## 2023-07-26 RX ADMIN — Medication 200 MILLIGRAM(S): at 15:00

## 2023-07-26 RX ADMIN — Medication 975 MILLIGRAM(S): at 17:03

## 2023-07-26 RX ADMIN — SODIUM CHLORIDE 100 MILLILITER(S): 9 INJECTION, SOLUTION INTRAVENOUS at 21:58

## 2023-07-26 RX ADMIN — HEPARIN SODIUM 7500 UNIT(S): 5000 INJECTION INTRAVENOUS; SUBCUTANEOUS at 05:53

## 2023-07-26 RX ADMIN — Medication 975 MILLIGRAM(S): at 13:50

## 2023-07-26 NOTE — PROGRESS NOTE ADULT - ASSESSMENT
38 yo female POD#1 s/p L PCNL 1st stage  - NT to drainage bag  - am labs  - CT renal stone hunt today  - plan for 2nd stage procedure 7/27 or 7/28  - incentive spirometer  - OOB to chair/ambulate  - pt encouraged to drink plenty of fluids 38 yo female POD#1 s/p L PCNL 1st stage  - NT to drainage bag  - am labs  - CT renal stone hunt today  - plan for 2nd stage procedure 7/27 or 7/28  - incentive spirometer  - OOB to chair/ambulate  - pt encouraged to drink plenty of fluids  - cont ancef

## 2023-07-27 ENCOUNTER — RESULT REVIEW (OUTPATIENT)
Age: 38
End: 2023-07-27

## 2023-07-27 ENCOUNTER — APPOINTMENT (OUTPATIENT)
Dept: UROLOGY | Facility: HOSPITAL | Age: 38
End: 2023-07-27
Payer: MEDICAID

## 2023-07-27 LAB
ANION GAP SERPL CALC-SCNC: 14 MMOL/L — SIGNIFICANT CHANGE UP (ref 5–17)
ANION GAP SERPL CALC-SCNC: 15 MMOL/L — SIGNIFICANT CHANGE UP (ref 5–17)
BUN SERPL-MCNC: 12.3 MG/DL — SIGNIFICANT CHANGE UP (ref 8–20)
BUN SERPL-MCNC: 14.4 MG/DL — SIGNIFICANT CHANGE UP (ref 8–20)
CALCIUM SERPL-MCNC: 8 MG/DL — LOW (ref 8.4–10.5)
CALCIUM SERPL-MCNC: 8.1 MG/DL — LOW (ref 8.4–10.5)
CHLORIDE SERPL-SCNC: 102 MMOL/L — SIGNIFICANT CHANGE UP (ref 96–108)
CHLORIDE SERPL-SCNC: 103 MMOL/L — SIGNIFICANT CHANGE UP (ref 96–108)
CO2 SERPL-SCNC: 17 MMOL/L — LOW (ref 22–29)
CO2 SERPL-SCNC: 20 MMOL/L — LOW (ref 22–29)
CREAT SERPL-MCNC: 0.86 MG/DL — SIGNIFICANT CHANGE UP (ref 0.5–1.3)
CREAT SERPL-MCNC: 0.92 MG/DL — SIGNIFICANT CHANGE UP (ref 0.5–1.3)
EGFR: 81 ML/MIN/1.73M2 — SIGNIFICANT CHANGE UP
EGFR: 88 ML/MIN/1.73M2 — SIGNIFICANT CHANGE UP
GLUCOSE SERPL-MCNC: 106 MG/DL — HIGH (ref 70–99)
GLUCOSE SERPL-MCNC: 98 MG/DL — SIGNIFICANT CHANGE UP (ref 70–99)
HCT VFR BLD CALC: 36 % — SIGNIFICANT CHANGE UP (ref 34.5–45)
HCT VFR BLD CALC: 39 % — SIGNIFICANT CHANGE UP (ref 34.5–45)
HGB BLD-MCNC: 12.1 G/DL — SIGNIFICANT CHANGE UP (ref 11.5–15.5)
HGB BLD-MCNC: 12.9 G/DL — SIGNIFICANT CHANGE UP (ref 11.5–15.5)
MCHC RBC-ENTMCNC: 30.9 PG — SIGNIFICANT CHANGE UP (ref 27–34)
MCHC RBC-ENTMCNC: 31.2 PG — SIGNIFICANT CHANGE UP (ref 27–34)
MCHC RBC-ENTMCNC: 33.1 GM/DL — SIGNIFICANT CHANGE UP (ref 32–36)
MCHC RBC-ENTMCNC: 33.6 GM/DL — SIGNIFICANT CHANGE UP (ref 32–36)
MCV RBC AUTO: 92.8 FL — SIGNIFICANT CHANGE UP (ref 80–100)
MCV RBC AUTO: 93.3 FL — SIGNIFICANT CHANGE UP (ref 80–100)
PLATELET # BLD AUTO: 181 K/UL — SIGNIFICANT CHANGE UP (ref 150–400)
PLATELET # BLD AUTO: 285 K/UL — SIGNIFICANT CHANGE UP (ref 150–400)
POTASSIUM SERPL-MCNC: 4.1 MMOL/L — SIGNIFICANT CHANGE UP (ref 3.5–5.3)
POTASSIUM SERPL-MCNC: 4.2 MMOL/L — SIGNIFICANT CHANGE UP (ref 3.5–5.3)
POTASSIUM SERPL-SCNC: 4.1 MMOL/L — SIGNIFICANT CHANGE UP (ref 3.5–5.3)
POTASSIUM SERPL-SCNC: 4.2 MMOL/L — SIGNIFICANT CHANGE UP (ref 3.5–5.3)
RBC # BLD: 3.88 M/UL — SIGNIFICANT CHANGE UP (ref 3.8–5.2)
RBC # BLD: 4.18 M/UL — SIGNIFICANT CHANGE UP (ref 3.8–5.2)
RBC # FLD: 13.5 % — SIGNIFICANT CHANGE UP (ref 10.3–14.5)
RBC # FLD: 13.6 % — SIGNIFICANT CHANGE UP (ref 10.3–14.5)
SODIUM SERPL-SCNC: 134 MMOL/L — LOW (ref 135–145)
SODIUM SERPL-SCNC: 136 MMOL/L — SIGNIFICANT CHANGE UP (ref 135–145)
WBC # BLD: 12.13 K/UL — HIGH (ref 3.8–10.5)
WBC # BLD: 9.08 K/UL — SIGNIFICANT CHANGE UP (ref 3.8–10.5)
WBC # FLD AUTO: 12.13 K/UL — HIGH (ref 3.8–10.5)
WBC # FLD AUTO: 9.08 K/UL — SIGNIFICANT CHANGE UP (ref 3.8–10.5)

## 2023-07-27 PROCEDURE — 50081 PERQ NL/PL LITHOTRP CPLX>2CM: CPT | Mod: LT,22,58

## 2023-07-27 PROCEDURE — 87086 URINE CULTURE/COLONY COUNT: CPT

## 2023-07-27 PROCEDURE — 50437 DILAT XST TRC NEW ACCESS RCS: CPT | Mod: LT,58,59

## 2023-07-27 PROCEDURE — C9399: CPT

## 2023-07-27 PROCEDURE — 88300 SURGICAL PATH GROSS: CPT | Mod: 26

## 2023-07-27 PROCEDURE — 50437 DILAT XST TRC NEW ACCESS RCS: CPT | Mod: LT,59,58

## 2023-07-27 PROCEDURE — 71045 X-RAY EXAM CHEST 1 VIEW: CPT | Mod: 26

## 2023-07-27 PROCEDURE — 81001 URINALYSIS AUTO W/SCOPE: CPT

## 2023-07-27 DEVICE — STENT URET PERCFLX 8X24 DBL J: Type: IMPLANTABLE DEVICE | Site: LEFT | Status: FUNCTIONAL

## 2023-07-27 DEVICE — GUIDEWIRE SENSOR DUAL-FLEX NITINOL STRAIGHT .035" X 150CM: Type: IMPLANTABLE DEVICE | Site: LEFT | Status: FUNCTIONAL

## 2023-07-27 DEVICE — URETERAL CATH OPEN END FLEXI-TIP 5FR .038" X 70CM: Type: IMPLANTABLE DEVICE | Site: LEFT | Status: FUNCTIONAL

## 2023-07-27 DEVICE — URETERAL CATH IMAGER II BERN 5FR 65CM: Type: IMPLANTABLE DEVICE | Site: LEFT | Status: FUNCTIONAL

## 2023-07-27 DEVICE — AMPLATZ RENAL DILATOR 6FR-30FR X 16CM: Type: IMPLANTABLE DEVICE | Site: LEFT | Status: FUNCTIONAL

## 2023-07-27 DEVICE — TRILOGY PROBE KIT 3.9MM X 440MM (BLUE): Type: IMPLANTABLE DEVICE | Site: LEFT | Status: FUNCTIONAL

## 2023-07-27 RX ORDER — LEVOTHYROXINE SODIUM 125 MCG
25 TABLET ORAL DAILY
Refills: 0 | Status: DISCONTINUED | OUTPATIENT
Start: 2023-07-27 | End: 2023-07-30

## 2023-07-27 RX ORDER — CEFTRIAXONE 500 MG/1
2000 INJECTION, POWDER, FOR SOLUTION INTRAMUSCULAR; INTRAVENOUS EVERY 24 HOURS
Refills: 0 | Status: DISCONTINUED | OUTPATIENT
Start: 2023-07-27 | End: 2023-07-30

## 2023-07-27 RX ORDER — HYDROMORPHONE HYDROCHLORIDE 2 MG/ML
0.5 INJECTION INTRAMUSCULAR; INTRAVENOUS; SUBCUTANEOUS
Refills: 0 | Status: DISCONTINUED | OUTPATIENT
Start: 2023-07-27 | End: 2023-07-27

## 2023-07-27 RX ORDER — SODIUM CHLORIDE 9 MG/ML
1000 INJECTION, SOLUTION INTRAVENOUS
Refills: 0 | Status: DISCONTINUED | OUTPATIENT
Start: 2023-07-27 | End: 2023-07-27

## 2023-07-27 RX ORDER — KETOROLAC TROMETHAMINE 30 MG/ML
15 SYRINGE (ML) INJECTION EVERY 6 HOURS
Refills: 0 | Status: DISCONTINUED | OUTPATIENT
Start: 2023-07-27 | End: 2023-07-30

## 2023-07-27 RX ORDER — OXYCODONE HYDROCHLORIDE 5 MG/1
10 TABLET ORAL EVERY 4 HOURS
Refills: 0 | Status: DISCONTINUED | OUTPATIENT
Start: 2023-07-27 | End: 2023-07-30

## 2023-07-27 RX ORDER — ONDANSETRON 8 MG/1
4 TABLET, FILM COATED ORAL ONCE
Refills: 0 | Status: DISCONTINUED | OUTPATIENT
Start: 2023-07-27 | End: 2023-07-27

## 2023-07-27 RX ORDER — FENTANYL CITRATE 50 UG/ML
25 INJECTION INTRAVENOUS
Refills: 0 | Status: DISCONTINUED | OUTPATIENT
Start: 2023-07-27 | End: 2023-07-27

## 2023-07-27 RX ORDER — ACETAMINOPHEN 500 MG
975 TABLET ORAL EVERY 6 HOURS
Refills: 0 | Status: DISCONTINUED | OUTPATIENT
Start: 2023-07-27 | End: 2023-07-30

## 2023-07-27 RX ORDER — OXYCODONE HYDROCHLORIDE 5 MG/1
5 TABLET ORAL EVERY 4 HOURS
Refills: 0 | Status: DISCONTINUED | OUTPATIENT
Start: 2023-07-27 | End: 2023-07-30

## 2023-07-27 RX ADMIN — Medication 975 MILLIGRAM(S): at 13:28

## 2023-07-27 RX ADMIN — Medication 200 MILLIGRAM(S): at 13:28

## 2023-07-27 RX ADMIN — OXYCODONE HYDROCHLORIDE 10 MILLIGRAM(S): 5 TABLET ORAL at 22:47

## 2023-07-27 RX ADMIN — Medication 975 MILLIGRAM(S): at 18:26

## 2023-07-27 RX ADMIN — Medication 975 MILLIGRAM(S): at 00:13

## 2023-07-27 RX ADMIN — Medication 975 MILLIGRAM(S): at 23:00

## 2023-07-27 RX ADMIN — Medication 975 MILLIGRAM(S): at 05:29

## 2023-07-27 RX ADMIN — FENTANYL CITRATE 25 MICROGRAM(S): 50 INJECTION INTRAVENOUS at 18:27

## 2023-07-27 RX ADMIN — OXYCODONE HYDROCHLORIDE 10 MILLIGRAM(S): 5 TABLET ORAL at 23:47

## 2023-07-27 RX ADMIN — Medication 975 MILLIGRAM(S): at 14:00

## 2023-07-27 RX ADMIN — Medication 25 MICROGRAM(S): at 05:29

## 2023-07-27 RX ADMIN — FENTANYL CITRATE 25 MICROGRAM(S): 50 INJECTION INTRAVENOUS at 18:15

## 2023-07-27 RX ADMIN — Medication 975 MILLIGRAM(S): at 01:17

## 2023-07-27 RX ADMIN — Medication 200 MILLIGRAM(S): at 05:28

## 2023-07-27 RX ADMIN — HEPARIN SODIUM 7500 UNIT(S): 5000 INJECTION INTRAVENOUS; SUBCUTANEOUS at 05:29

## 2023-07-27 RX ADMIN — Medication 975 MILLIGRAM(S): at 06:30

## 2023-07-27 RX ADMIN — CEFTRIAXONE 2000 MILLIGRAM(S): 500 INJECTION, POWDER, FOR SOLUTION INTRAMUSCULAR; INTRAVENOUS at 23:03

## 2023-07-27 RX ADMIN — Medication 975 MILLIGRAM(S): at 18:07

## 2023-07-27 NOTE — BRIEF OPERATIVE NOTE - NSICDXBRIEFPROCEDURE_GEN_ALL_CORE_FT
PROCEDURES:  Cystoscopy with percutaneous nephrolithotomy 26-Jul-2023 18:05:22  Jesu Perkins  
PROCEDURES:  Cystoscopy with percutaneous nephrolithotomy 26-Jul-2023 18:05:22  Jesu Perkins

## 2023-07-27 NOTE — BRIEF OPERATIVE NOTE - OPERATION/FINDINGS
1-1-200  lower pole access  multiple stones in all calyces  flexible scope to upper calyx as well  severe edema due to suspected infected stone  procedure stopped after the main bulk was treated due to reduced visibility, planned for second stage  8/24 nephroureteral tube    reduced visibility due to bleeding
1-2-100  8/24 nephroureteral tube in the lower calyx

## 2023-07-27 NOTE — BRIEF OPERATIVE NOTE - NSICDXBRIEFPOSTOP_GEN_ALL_CORE_FT
POST-OP DIAGNOSIS:  Kidney stone 26-Jul-2023 18:05:33  Jesu Perkins  
POST-OP DIAGNOSIS:  Kidney stone 26-Jul-2023 18:05:33  Jesu Perkins

## 2023-07-28 ENCOUNTER — TRANSCRIPTION ENCOUNTER (OUTPATIENT)
Age: 38
End: 2023-07-28

## 2023-07-28 LAB
ANION GAP SERPL CALC-SCNC: 15 MMOL/L — SIGNIFICANT CHANGE UP (ref 5–17)
BUN SERPL-MCNC: 15.8 MG/DL — SIGNIFICANT CHANGE UP (ref 8–20)
CALCIUM SERPL-MCNC: 8.4 MG/DL — SIGNIFICANT CHANGE UP (ref 8.4–10.5)
CHLORIDE SERPL-SCNC: 102 MMOL/L — SIGNIFICANT CHANGE UP (ref 96–108)
CO2 SERPL-SCNC: 21 MMOL/L — LOW (ref 22–29)
CREAT SERPL-MCNC: 1.05 MG/DL — SIGNIFICANT CHANGE UP (ref 0.5–1.3)
EGFR: 69 ML/MIN/1.73M2 — SIGNIFICANT CHANGE UP
GLUCOSE SERPL-MCNC: 104 MG/DL — HIGH (ref 70–99)
HCT VFR BLD CALC: 34.4 % — LOW (ref 34.5–45)
HGB BLD-MCNC: 11.3 G/DL — LOW (ref 11.5–15.5)
MCHC RBC-ENTMCNC: 31 PG — SIGNIFICANT CHANGE UP (ref 27–34)
MCHC RBC-ENTMCNC: 32.8 GM/DL — SIGNIFICANT CHANGE UP (ref 32–36)
MCV RBC AUTO: 94.2 FL — SIGNIFICANT CHANGE UP (ref 80–100)
PLATELET # BLD AUTO: 216 K/UL — SIGNIFICANT CHANGE UP (ref 150–400)
POTASSIUM SERPL-MCNC: 4 MMOL/L — SIGNIFICANT CHANGE UP (ref 3.5–5.3)
POTASSIUM SERPL-SCNC: 4 MMOL/L — SIGNIFICANT CHANGE UP (ref 3.5–5.3)
RBC # BLD: 3.65 M/UL — LOW (ref 3.8–5.2)
RBC # FLD: 13.6 % — SIGNIFICANT CHANGE UP (ref 10.3–14.5)
SODIUM SERPL-SCNC: 138 MMOL/L — SIGNIFICANT CHANGE UP (ref 135–145)
WBC # BLD: 11.64 K/UL — HIGH (ref 3.8–10.5)
WBC # FLD AUTO: 11.64 K/UL — HIGH (ref 3.8–10.5)

## 2023-07-28 PROCEDURE — 74176 CT ABD & PELVIS W/O CONTRAST: CPT | Mod: 26

## 2023-07-28 PROCEDURE — 93010 ELECTROCARDIOGRAM REPORT: CPT

## 2023-07-28 RX ORDER — IBUPROFEN 200 MG
600 TABLET ORAL ONCE
Refills: 0 | Status: COMPLETED | OUTPATIENT
Start: 2023-07-28 | End: 2023-07-28

## 2023-07-28 RX ORDER — LIDOCAINE 4 G/100G
2 CREAM TOPICAL EVERY 24 HOURS
Refills: 0 | Status: DISCONTINUED | OUTPATIENT
Start: 2023-07-28 | End: 2023-07-30

## 2023-07-28 RX ORDER — SODIUM CHLORIDE 9 MG/ML
1000 INJECTION, SOLUTION INTRAVENOUS ONCE
Refills: 0 | Status: COMPLETED | OUTPATIENT
Start: 2023-07-28 | End: 2023-07-28

## 2023-07-28 RX ADMIN — LIDOCAINE 2 PATCH: 4 CREAM TOPICAL at 17:00

## 2023-07-28 RX ADMIN — Medication 25 MICROGRAM(S): at 05:33

## 2023-07-28 RX ADMIN — Medication 975 MILLIGRAM(S): at 06:34

## 2023-07-28 RX ADMIN — Medication 975 MILLIGRAM(S): at 17:40

## 2023-07-28 RX ADMIN — Medication 975 MILLIGRAM(S): at 23:30

## 2023-07-28 RX ADMIN — Medication 600 MILLIGRAM(S): at 19:10

## 2023-07-28 RX ADMIN — CEFTRIAXONE 2000 MILLIGRAM(S): 500 INJECTION, POWDER, FOR SOLUTION INTRAMUSCULAR; INTRAVENOUS at 23:30

## 2023-07-28 RX ADMIN — Medication 600 MILLIGRAM(S): at 18:10

## 2023-07-28 RX ADMIN — Medication 975 MILLIGRAM(S): at 05:34

## 2023-07-28 RX ADMIN — OXYCODONE HYDROCHLORIDE 10 MILLIGRAM(S): 5 TABLET ORAL at 18:40

## 2023-07-28 RX ADMIN — Medication 975 MILLIGRAM(S): at 12:07

## 2023-07-28 RX ADMIN — Medication 975 MILLIGRAM(S): at 00:00

## 2023-07-28 RX ADMIN — LIDOCAINE 2 PATCH: 4 CREAM TOPICAL at 07:09

## 2023-07-28 RX ADMIN — OXYCODONE HYDROCHLORIDE 10 MILLIGRAM(S): 5 TABLET ORAL at 06:34

## 2023-07-28 RX ADMIN — Medication 975 MILLIGRAM(S): at 18:39

## 2023-07-28 RX ADMIN — OXYCODONE HYDROCHLORIDE 10 MILLIGRAM(S): 5 TABLET ORAL at 05:34

## 2023-07-28 RX ADMIN — LIDOCAINE 2 PATCH: 4 CREAM TOPICAL at 05:32

## 2023-07-28 RX ADMIN — SODIUM CHLORIDE 1000 MILLILITER(S): 9 INJECTION, SOLUTION INTRAVENOUS at 18:29

## 2023-07-28 RX ADMIN — Medication 975 MILLIGRAM(S): at 11:08

## 2023-07-28 RX ADMIN — OXYCODONE HYDROCHLORIDE 10 MILLIGRAM(S): 5 TABLET ORAL at 17:41

## 2023-07-28 NOTE — DISCHARGE NOTE PROVIDER - HOSPITAL COURSE
36 yo female with b/l large renal calculi underwent a 1st and 2nd stage L PCNL 7/25, 7/27.  Pt had a temp on POD#2 most likely from atelectasis.  pt encouraged to use spirometer.  Pt was able to tolerate a diet, was voiding without difficulty. The left NT was draining light hematuria, as expected, started to clear.  Repeat CT 7/28 showed................. 36 yo female with b/l large renal calculi underwent a 1st and 2nd stage L PCNL 7/25, 7/27.  Pt had a temp on POD#2 most likely from atelectasis.  pt encouraged to use spirometer.  Pt was able to tolerate a diet, was voiding without difficulty. The left NT was draining light hematuria, as expected, started to clear.  Patient with CT renal stone hunt post op = KIDNEYS/URETERS: A stable 2.5 cm right upper pole calculus. No hydronephrosis of the right kidney. Left-sided percutaneous nephrostomy and nephroureteral stent are stable in position. Decreased stone burden in the left kidney with a residual 5 mm calculus in the upper pole. Trace   high density in the left renal collecting system likely hemorrhage. Small left perinephric hematoma measures 6.2 x 3.4 cm. Postprocedural air in the left posterior perinephric space.  BLADDER: Minimally distended.   IMPRESSION:  Decreased stone burden of the left kidney with residual 5 mm upper pole calculus.  Small left perinephric hematoma.  Patient post op course stable slowly improving nephrostomy tube noting more yellowish output.   On evening of 28th patient spike a temperature thus patient was held over another day.  Patient was followed closely , with repeat labs and f/u vitals noting patient continued to improve, nephrostomy tube with yellow drainage patient feeling better, tolerating PO and OOB and ambulating.  Patient also voiding well.  Patient remained afebrile over night 29th and today removed the nephrostomy tube and applied pressure dressing.  With  explained to patient about removal of tube and also that may require several dressing changes per day for 1-3 days she understands and has her brother who can help her.  Explained with  full follow up instructions with Dr. Perkins.  Also that she will be taking antibiotic 1 pill 3 times a day for 2 weeks , and for pain can use over the counter medication and she understands.

## 2023-07-28 NOTE — CHART NOTE - NSCHARTNOTEFT_GEN_A_CORE
Post-op Check    Subjective:  Pt examined at bedside c/o right shoulder pain, on exam no notable injury, swelling noted, lidocaine patches ordered. Left nephrostomy well seated with thin light fruit punch colored output, voiding with no issues. Post -op labs / cxray wnl. Patient to have a repeat ct scan this am with am labs Denies chest pain, SOB, palpitations.     STATUS POST:  Cystoscopy with percutaneous nephrolithotomy    POST OPERATIVE DAY #: 1    MEDICATIONS  (STANDING):  acetaminophen     Tablet .. 975 milliGRAM(s) Oral every 6 hours  cefTRIAXone Injectable. 2000 milliGRAM(s) IV Push every 24 hours  levothyroxine 25 MICROGram(s) Oral daily  lidocaine   4% Patch 2 Patch Transdermal every 24 hours    MEDICATIONS  (PRN):  ketorolac   Injectable 15 milliGRAM(s) IV Push every 6 hours PRN Mild Pain (1 - 3)  oxyCODONE    IR 10 milliGRAM(s) Oral every 4 hours PRN Severe Pain (7 - 10)  oxyCODONE    IR 5 milliGRAM(s) Oral every 4 hours PRN Moderate Pain (4 - 6)      Vital Signs Last 24 Hrs  T(C): 37.1 (27 Jul 2023 19:25), Max: 38.4 (27 Jul 2023 04:23)  T(F): 98.7 (27 Jul 2023 19:25), Max: 101.2 (27 Jul 2023 04:23)  HR: 86 (27 Jul 2023 19:25) (76 - 94)  BP: 126/85 (27 Jul 2023 19:25) (123/82 - 150/96)  BP(mean): 91 (27 Jul 2023 18:15) (91 - 108)  RR: 18 (27 Jul 2023 19:25) (15 - 23)  SpO2: 93% (27 Jul 2023 19:25) (92% - 97%)    Parameters below as of 27 Jul 2023 19:25  Patient On (Oxygen Delivery Method): room air        Physical Exam:    Constitutional: appears uncomfortable   Respiratory: no accessory muscle use, respirations non-labored  GI: abdomen soft, non-tender  : voiding, left nephrostomy with this light fruit punch colored output  Neurological: A&O x 3; without gross deficit    A:     P:  monitor uop  f/u am labs  f/u repeat ct [place]  Pain control  OOB as tolerated  Encourage IS  DVT ppx
Post-op Check    Subjective:  Pt offers no acute complaints at this time. Pain well controlled on current regiment. Denies chest pain, SOB, palpitations.     STATUS POST:  1st stage PCNL    POST OPERATIVE DAY #: 0    MEDICATIONS  (STANDING):  ceFAZolin   IVPB 3000 milliGRAM(s) IV Intermittent every 8 hours  heparin   Injectable 7500 Unit(s) SubCutaneous every 8 hours  multiple electrolytes Injection Type 1 1000 milliLiter(s) (100 mL/Hr) IV Continuous <Continuous>    MEDICATIONS  (PRN):  oxyCODONE    IR 5 milliGRAM(s) Oral every 4 hours PRN Moderate Pain (4 - 6)  oxyCODONE    IR 10 milliGRAM(s) Oral every 4 hours PRN Severe Pain (7 - 10)      Vital Signs Last 24 Hrs  T(C): 37 (26 Jul 2023 05:06), Max: 37 (26 Jul 2023 05:06)  T(F): 98.6 (26 Jul 2023 05:06), Max: 98.6 (26 Jul 2023 05:06)  HR: 88 (26 Jul 2023 05:06) (77 - 88)  BP: 116/80 (26 Jul 2023 05:06) (105/68 - 145/93)  BP(mean): 80 (25 Jul 2023 21:25) (80 - 110)  RR: 18 (26 Jul 2023 05:06) (14 - 18)  SpO2: 93% (26 Jul 2023 05:06) (93% - 99%)    Parameters below as of 26 Jul 2023 05:06  Patient On (Oxygen Delivery Method): room air        Physical Exam:    Constitutional: NAD  Respiratory: no accessory muscle use, respirations non-labored  GI: abdomen soft, non-tender, no guarding  Neurological: A&O x 3; without gross deficit    A:     P:  continue IV abx  monitor uop/ pcnl output  Pain control  OOB as tolerated  Encourage IS  DVT ppx

## 2023-07-28 NOTE — DISCHARGE NOTE PROVIDER - NSDCFUADDAPPT_GEN_ALL_CORE_FT
Continue antibiotics (Keflex ) 1 pill 3 times a day for 2 weeks tamsulosin 1 tablet per day for 20 days.   For pain can use over the counter medication.

## 2023-07-28 NOTE — PROGRESS NOTE ADULT - ASSESSMENT
36 yo female POD#1 s/p L PCNL 2nd stage  - CT scan today, r/o residual stones  - am labs  - OOB/ambulate  - incentive spirometer  - d/c planning possibly tomorrow

## 2023-07-28 NOTE — DISCHARGE NOTE PROVIDER - NSDCMRMEDTOKEN_GEN_ALL_CORE_FT
ibuprofen:   levothyroxine 25 mcg (0.025 mg) oral tablet: 1 tab(s) orally once a day   levothyroxine 25 mcg (0.025 mg) oral tablet: 1 tab(s) orally once a day   cephalexin 500 mg oral capsule: 1 cap(s) orally 3 times a day MDD: 3 caps  levothyroxine 25 mcg (0.025 mg) oral tablet: 1 tab(s) orally once a day  tamsulosin 0.4 mg oral capsule: 1 cap(s) orally once a day MDD: 1 cap

## 2023-07-28 NOTE — DISCHARGE NOTE PROVIDER - DID THE PATIENT PRESENT WITH OR WAS TREATED FOR MALNUTRITION DURING THIS ADMISSION
POST- ANESTHESIA EVALUATION       Pt Name: Mazin Ulloa  MRN: [id number]  YOB: 1981  Date of evaluation: 9/21/2021  Time:  11:29 AM      BP (!) 140/96   Pulse 73   Temp 97.3 °F (36.3 °C) (Infrared)   Resp 12   Ht 5' 10\" (1.778 m)   Wt 201 lb 2 oz (91.2 kg)   LMP 06/15/2021 (Approximate)   SpO2 99%   BMI 28.86 kg/m²      Consciousness Level  Awake  Cardiopulmonary Status  Stable  Pain Adequately Treated YES  Nausea / Vomiting  NO  Adequate Hydration  YES  Anesthesia Related Complications NONE      Electronically signed by Adolph Hill MD on 9/21/2021 at 11:29 AM       Department of Anesthesiology  Postprocedure Note    Patient: Mazin Ulloa  MRN: [id number]  YOB: 1981  Date of evaluation: 9/21/2021  Time:  11:29 AM     Procedure Summary     Date: 09/21/21 Room / Location: [address]    Anesthesia Start: 3262 Anesthesia Stop: 3189    Procedure: HYSTEROSCOPY, DILATATION AND CURETTAGE WITH [place] (N/A Vagina ) Diagnosis: (HEAVY MENSTRAL BLEEDING, ABNORMAL ULTRASOUND)    Surgeons: Diaz Plummer DO Responsible Provider: Adolph Hill MD    Anesthesia Type: general ASA Status: 1          Anesthesia Type: general    Jacqueline Phase I: Jacqueline Score: 8    Jacqueline Phase II: Jacqueline Score: 9    Last vitals: Reviewed and per EMR flowsheets.        Anesthesia Post Evaluation
No

## 2023-07-28 NOTE — DISCHARGE NOTE PROVIDER - NSDCCPCAREPLAN_GEN_ALL_CORE_FT
PRINCIPAL DISCHARGE DIAGNOSIS  Diagnosis: Multiple renal calculi  Assessment and Plan of Treatment: - you may shower  - drink plenty of fluids  - no heavy lifting or straining  - change left back dressing with dry gauze as needed  - call the office if you develop a fever, chills, nausea or vomiting

## 2023-07-28 NOTE — DISCHARGE NOTE PROVIDER - NSDCCPTREATMENT_GEN_ALL_CORE_FT
PRINCIPAL PROCEDURE  Procedure: Percutaneous nephrostolithotomy with fluoroscopic guidance  Findings and Treatment:       SECONDARY PROCEDURE  Procedure: Second look nephroscopy after percutaneous nephrolithotomy  Findings and Treatment: 2nd stage L PCNL

## 2023-07-28 NOTE — DISCHARGE NOTE PROVIDER - CARE PROVIDER_API CALL
Jesu Perkins  Urology  [address], [address]  [place] [address]  Phone: [phone]  Fax: [phone]  Follow Up Time: 1 week

## 2023-07-29 LAB
ANION GAP SERPL CALC-SCNC: 13 MMOL/L — SIGNIFICANT CHANGE UP (ref 5–17)
BUN SERPL-MCNC: 12.4 MG/DL — SIGNIFICANT CHANGE UP (ref 8–20)
CALCIUM SERPL-MCNC: 8.6 MG/DL — SIGNIFICANT CHANGE UP (ref 8.4–10.5)
CHLORIDE SERPL-SCNC: 104 MMOL/L — SIGNIFICANT CHANGE UP (ref 96–108)
CO2 SERPL-SCNC: 23 MMOL/L — SIGNIFICANT CHANGE UP (ref 22–29)
CREAT SERPL-MCNC: 0.88 MG/DL — SIGNIFICANT CHANGE UP (ref 0.5–1.3)
EGFR: 86 ML/MIN/1.73M2 — SIGNIFICANT CHANGE UP
GLUCOSE SERPL-MCNC: 104 MG/DL — HIGH (ref 70–99)
HCT VFR BLD CALC: 32.9 % — LOW (ref 34.5–45)
HGB BLD-MCNC: 10.7 G/DL — LOW (ref 11.5–15.5)
MCHC RBC-ENTMCNC: 30.1 PG — SIGNIFICANT CHANGE UP (ref 27–34)
MCHC RBC-ENTMCNC: 32.5 GM/DL — SIGNIFICANT CHANGE UP (ref 32–36)
MCV RBC AUTO: 92.4 FL — SIGNIFICANT CHANGE UP (ref 80–100)
PLATELET # BLD AUTO: 279 K/UL — SIGNIFICANT CHANGE UP (ref 150–400)
POTASSIUM SERPL-MCNC: 4.3 MMOL/L — SIGNIFICANT CHANGE UP (ref 3.5–5.3)
POTASSIUM SERPL-SCNC: 4.3 MMOL/L — SIGNIFICANT CHANGE UP (ref 3.5–5.3)
RBC # BLD: 3.56 M/UL — LOW (ref 3.8–5.2)
RBC # FLD: 13.5 % — SIGNIFICANT CHANGE UP (ref 10.3–14.5)
SODIUM SERPL-SCNC: 139 MMOL/L — SIGNIFICANT CHANGE UP (ref 135–145)
WBC # BLD: 12.57 K/UL — HIGH (ref 3.8–10.5)
WBC # FLD AUTO: 12.57 K/UL — HIGH (ref 3.8–10.5)

## 2023-07-29 RX ORDER — POLYETHYLENE GLYCOL 3350 17 G/17G
17 POWDER, FOR SOLUTION ORAL ONCE
Refills: 0 | Status: COMPLETED | OUTPATIENT
Start: 2023-07-29 | End: 2023-07-29

## 2023-07-29 RX ADMIN — LIDOCAINE 2 PATCH: 4 CREAM TOPICAL at 05:34

## 2023-07-29 RX ADMIN — Medication 975 MILLIGRAM(S): at 18:38

## 2023-07-29 RX ADMIN — Medication 975 MILLIGRAM(S): at 11:12

## 2023-07-29 RX ADMIN — Medication 975 MILLIGRAM(S): at 00:31

## 2023-07-29 RX ADMIN — Medication 975 MILLIGRAM(S): at 22:38

## 2023-07-29 RX ADMIN — Medication 975 MILLIGRAM(S): at 17:55

## 2023-07-29 RX ADMIN — Medication 975 MILLIGRAM(S): at 23:00

## 2023-07-29 RX ADMIN — OXYCODONE HYDROCHLORIDE 10 MILLIGRAM(S): 5 TABLET ORAL at 16:00

## 2023-07-29 RX ADMIN — Medication 975 MILLIGRAM(S): at 06:45

## 2023-07-29 RX ADMIN — Medication 25 MICROGRAM(S): at 05:35

## 2023-07-29 RX ADMIN — LIDOCAINE 2 PATCH: 4 CREAM TOPICAL at 18:38

## 2023-07-29 RX ADMIN — Medication 975 MILLIGRAM(S): at 05:35

## 2023-07-29 RX ADMIN — CEFTRIAXONE 2000 MILLIGRAM(S): 500 INJECTION, POWDER, FOR SOLUTION INTRAMUSCULAR; INTRAVENOUS at 22:38

## 2023-07-29 RX ADMIN — LIDOCAINE 2 PATCH: 4 CREAM TOPICAL at 07:32

## 2023-07-29 RX ADMIN — Medication 975 MILLIGRAM(S): at 10:56

## 2023-07-29 RX ADMIN — POLYETHYLENE GLYCOL 3350 17 GRAM(S): 17 POWDER, FOR SOLUTION ORAL at 18:46

## 2023-07-30 ENCOUNTER — TRANSCRIPTION ENCOUNTER (OUTPATIENT)
Age: 38
End: 2023-07-30

## 2023-07-30 VITALS
TEMPERATURE: 98 F | HEART RATE: 98 BPM | RESPIRATION RATE: 18 BRPM | DIASTOLIC BLOOD PRESSURE: 83 MMHG | SYSTOLIC BLOOD PRESSURE: 132 MMHG | OXYGEN SATURATION: 93 %

## 2023-07-30 LAB
ANION GAP SERPL CALC-SCNC: 12 MMOL/L — SIGNIFICANT CHANGE UP (ref 5–17)
BUN SERPL-MCNC: 12.5 MG/DL — SIGNIFICANT CHANGE UP (ref 8–20)
CALCIUM SERPL-MCNC: 9 MG/DL — SIGNIFICANT CHANGE UP (ref 8.4–10.5)
CHLORIDE SERPL-SCNC: 102 MMOL/L — SIGNIFICANT CHANGE UP (ref 96–108)
CO2 SERPL-SCNC: 24 MMOL/L — SIGNIFICANT CHANGE UP (ref 22–29)
CREAT SERPL-MCNC: 0.85 MG/DL — SIGNIFICANT CHANGE UP (ref 0.5–1.3)
CULTURE RESULTS: NO GROWTH — SIGNIFICANT CHANGE UP
EGFR: 89 ML/MIN/1.73M2 — SIGNIFICANT CHANGE UP
GLUCOSE SERPL-MCNC: 87 MG/DL — SIGNIFICANT CHANGE UP (ref 70–99)
HCT VFR BLD CALC: 31.7 % — LOW (ref 34.5–45)
HGB BLD-MCNC: 10.7 G/DL — LOW (ref 11.5–15.5)
MCHC RBC-ENTMCNC: 30.7 PG — SIGNIFICANT CHANGE UP (ref 27–34)
MCHC RBC-ENTMCNC: 33.8 GM/DL — SIGNIFICANT CHANGE UP (ref 32–36)
MCV RBC AUTO: 91.1 FL — SIGNIFICANT CHANGE UP (ref 80–100)
PLATELET # BLD AUTO: 308 K/UL — SIGNIFICANT CHANGE UP (ref 150–400)
POTASSIUM SERPL-MCNC: 4.1 MMOL/L — SIGNIFICANT CHANGE UP (ref 3.5–5.3)
POTASSIUM SERPL-SCNC: 4.1 MMOL/L — SIGNIFICANT CHANGE UP (ref 3.5–5.3)
RBC # BLD: 3.48 M/UL — LOW (ref 3.8–5.2)
RBC # FLD: 13.4 % — SIGNIFICANT CHANGE UP (ref 10.3–14.5)
SODIUM SERPL-SCNC: 137 MMOL/L — SIGNIFICANT CHANGE UP (ref 135–145)
SPECIMEN SOURCE: SIGNIFICANT CHANGE UP
WBC # BLD: 11.97 K/UL — HIGH (ref 3.8–10.5)
WBC # FLD AUTO: 11.97 K/UL — HIGH (ref 3.8–10.5)

## 2023-07-30 PROCEDURE — 85027 COMPLETE CBC AUTOMATED: CPT

## 2023-07-30 PROCEDURE — 83735 ASSAY OF MAGNESIUM: CPT

## 2023-07-30 PROCEDURE — C2617: CPT

## 2023-07-30 PROCEDURE — 93005 ELECTROCARDIOGRAM TRACING: CPT

## 2023-07-30 PROCEDURE — C9399: CPT

## 2023-07-30 PROCEDURE — 87040 BLOOD CULTURE FOR BACTERIA: CPT

## 2023-07-30 PROCEDURE — 74176 CT ABD & PELVIS W/O CONTRAST: CPT | Mod: MA

## 2023-07-30 PROCEDURE — 88300 SURGICAL PATH GROSS: CPT

## 2023-07-30 PROCEDURE — 87086 URINE CULTURE/COLONY COUNT: CPT

## 2023-07-30 PROCEDURE — 82962 GLUCOSE BLOOD TEST: CPT

## 2023-07-30 PROCEDURE — C1887: CPT

## 2023-07-30 PROCEDURE — 80048 BASIC METABOLIC PNL TOTAL CA: CPT

## 2023-07-30 PROCEDURE — 36415 COLL VENOUS BLD VENIPUNCTURE: CPT

## 2023-07-30 PROCEDURE — 71045 X-RAY EXAM CHEST 1 VIEW: CPT

## 2023-07-30 PROCEDURE — C1889: CPT

## 2023-07-30 PROCEDURE — 84100 ASSAY OF PHOSPHORUS: CPT

## 2023-07-30 PROCEDURE — C1758: CPT

## 2023-07-30 PROCEDURE — C1726: CPT

## 2023-07-30 PROCEDURE — 76000 FLUOROSCOPY <1 HR PHYS/QHP: CPT

## 2023-07-30 PROCEDURE — C1769: CPT

## 2023-07-30 RX ORDER — CEPHALEXIN 500 MG
1 CAPSULE ORAL
Qty: 42 | Refills: 0
Start: 2023-07-30 | End: 2023-08-12

## 2023-07-30 RX ORDER — TAMSULOSIN HYDROCHLORIDE 0.4 MG/1
1 CAPSULE ORAL
Qty: 20 | Refills: 0
Start: 2023-07-30 | End: 2023-08-18

## 2023-07-30 RX ADMIN — Medication 25 MICROGRAM(S): at 05:00

## 2023-07-30 RX ADMIN — LIDOCAINE 2 PATCH: 4 CREAM TOPICAL at 07:38

## 2023-07-30 RX ADMIN — Medication 975 MILLIGRAM(S): at 11:15

## 2023-07-30 RX ADMIN — LIDOCAINE 2 PATCH: 4 CREAM TOPICAL at 16:16

## 2023-07-30 RX ADMIN — Medication 975 MILLIGRAM(S): at 05:00

## 2023-07-30 RX ADMIN — LIDOCAINE 2 PATCH: 4 CREAM TOPICAL at 04:59

## 2023-07-30 RX ADMIN — Medication 975 MILLIGRAM(S): at 06:15

## 2023-07-30 RX ADMIN — Medication 975 MILLIGRAM(S): at 11:21

## 2023-07-30 NOTE — DISCHARGE NOTE NURSING/CASE MANAGEMENT/SOCIAL WORK - PATIENT PORTAL LINK FT
You can access the FollowMyHealth Patient Portal offered by [facility] by registering at the following website: [email or web address]. By joining [employer]’s FollowMyHealth portal, you will also be able to view your health information using other applications (apps) compatible with our system.

## 2023-07-30 NOTE — PROGRESS NOTE ADULT - SUBJECTIVE AND OBJECTIVE BOX
UROLOGY F/U:  Patient seen and examined with Dr. Newell    POD# 4 s/p 1 st stage left PCNL  POD# 2 s/p 2 nd stage left PCNL    Patient with temp spike 102 last evening.  Resting comfortable in no acute distress.  Denies headache, dizziness no nausea, or vomiting.  No chills or feeling flushed.    Vital Signs Last 24 Hrs  T(C): 37.4 (29 Jul 2023 08:55), Max: 39.2 (28 Jul 2023 17:47)  T(F): 99.3 (29 Jul 2023 08:55), Max: 102.6 (28 Jul 2023 17:47)  HR: 98 (29 Jul 2023 08:55) (87 - 119)  BP: 106/75 (29 Jul 2023 08:55) (101/69 - 126/94)  RR: 18 (29 Jul 2023 08:55) (16 - 18)  SpO2: 93% (29 Jul 2023 08:55) (92% - 94%)  Parameters below as of 29 Jul 2023 08:55  Patient On (Oxygen Delivery Method): room air    Abdomen: soft, n/d, n/t no suprapubic discomfort  Back/Flank: left nephrostomy tube in place and patent, dressing dry scant drainage noted, nephrostomy with noting yellow, slightly cloudy urine draining with scant fragments stones  : voiding ok mild hematuria     Labs:  CBC:                        10.7   12.57 )-----------( 279      ( 29 Jul 2023 05:59 )             32.9     CHEMISTRY:  07-29    139  |  104  |  12.4  ----------------------------<  104<H>  4.3   |  23.0  |  0.88      Impression: stable POD# 2 s/p 2nd stage left PCNL, episode one temp spike last evening 102.  Plan: Continue present post op care and management, follow Lt. nephrostomy drainage, and voiding with repeat labs in am and follow temp.     
UROLOGY F/U:    POD# 5 s/p 1 st stage Lt. PCNL  POD# 3 s/p 2 nd stage Lt. PCNL    Patient seen and examined at bedside, resting comfortable no acute distress  Tolerating PO diet, denies n/v    Vital Signs Last 24 Hrs  T(C): 36.6 (30 Jul 2023 04:46), Max: 37.4 (29 Jul 2023 08:55)  T(F): 97.8 (30 Jul 2023 04:46), Max: 99.3 (29 Jul 2023 08:55)  HR: 83 (30 Jul 2023 04:46) (83 - 110)  BP: 114/79 (30 Jul 2023 04:46) (102/60 - 119/70)  RR: 18 (30 Jul 2023 04:46) (18 - 18)  SpO2: 94% (30 Jul 2023 04:46) (93% - 95%)  Parameters below as of 30 Jul 2023 04:46  Patient On (Oxygen Delivery Method): room air    Temp. max. 99.3 last evening     Back/Flank:  lt. nephrostomy tube draining well noted yellow urine, dressing dry and intact.  Mild discomfort tube site.   Abdomen: soft n/d, n/t   : voiding ok; - no recording of output.  patient OOB and ambulating.       I&O's Detail    29 Jul 2023 07:01  -  30 Jul 2023 07:00  --------------------------------------------------------  IN:    Oral Fluid: 350 mL  Total IN: 350 mL    OUT:    Drain (mL): 2825 mL  Total OUT: 2825 mL    Total NET: -2475 mL      Labs:  CBC:                        10.7   11.97 )-----------( 308      ( 30 Jul 2023 05:35 )             31.7     07-30    137  |  102  |  12.5  ----------------------------<  87  4.1   |  24.0  |  0.85        Impression:  POD# 3, tolerated surgery well; Improving doing well.  Lt. nephrostomy draining well ( yellow )   discuss with Surgeon present situation and continued care and management- possible d/c neph. tube and discharge home 12-24 Hrs.         
Subjective:37yFemale POD# 1 s/p 2nd stage L PCNL.  pt resting comfortably, no c/o pain.  Pt remains afebrile, tolerating a diet, voiding. L NT blood tinged urine.        Vital Signs Last 24 Hrs  T(C): 37.3 (28 Jul 2023 04:59), Max: 37.3 (28 Jul 2023 04:59)  T(F): 99.2 (28 Jul 2023 04:59), Max: 99.2 (28 Jul 2023 04:59)  HR: 100 (28 Jul 2023 04:59) (76 - 100)  BP: 108/71 (28 Jul 2023 04:59) (108/71 - 150/96)  BP(mean): 91 (27 Jul 2023 18:15) (91 - 108)  RR: 18 (28 Jul 2023 04:59) (15 - 23)  SpO2: 93% (28 Jul 2023 04:59) (92% - 97%)    Parameters below as of 28 Jul 2023 04:59  Patient On (Oxygen Delivery Method): room air      I&O's Detail    27 Jul 2023 07:01  -  28 Jul 2023 07:00  --------------------------------------------------------  IN:  Total IN: 0 mL    OUT:    Drain (mL): 1600 mL    Nephrostomy Tube (mL): 1100 mL    Voided (mL): 750 mL  Total OUT: 3450 mL    Total NET: -3450 mL      28 Jul 2023 07:01  -  28 Jul 2023 09:44  --------------------------------------------------------  IN:  Total IN: 0 mL    OUT:    Drain (mL): 150 mL  Total OUT: 150 mL    Total NET: -150 mL          Labs:                        11.3   11.64 )-----------( 216      ( 28 Jul 2023 05:30 )             34.4     07-28    138  |  102  |  15.8  ----------------------------<  104<H>  4.0   |  21.0<L>  |  1.05    Ca    8.4      28 Jul 2023 05:30          
Subjective:37yFemale POD#1 s/p L PCNL 1st stage.  pt resting comfortably, few c/o pain.  pt voiding, L NT hematuria, getting lighter in proximal tubing. No nausea or vomiting.        Vital Signs Last 24 Hrs  T(C): 37 (26 Jul 2023 05:06), Max: 37 (26 Jul 2023 05:06)  T(F): 98.6 (26 Jul 2023 05:06), Max: 98.6 (26 Jul 2023 05:06)  HR: 88 (26 Jul 2023 05:06) (77 - 88)  BP: 116/80 (26 Jul 2023 05:06) (105/68 - 145/93)  BP(mean): 80 (25 Jul 2023 21:25) (80 - 110)  RR: 18 (26 Jul 2023 05:06) (14 - 18)  SpO2: 93% (26 Jul 2023 05:06) (93% - 99%)    Parameters below as of 26 Jul 2023 05:06  Patient On (Oxygen Delivery Method): room air      I&O's Detail    25 Jul 2023 07:01  -  26 Jul 2023 07:00  --------------------------------------------------------  IN:    multiple electrolytes Injection Type 1.: 1000 mL  Total IN: 1000 mL    OUT:    Drain (mL): 535 mL  Total OUT: 535 mL    Total NET: 465 mL          Labs:                        12.5   13.56 )-----------( 299      ( 26 Jul 2023 06:10 )             37.3     07-26    138  |  106  |  14.1  ----------------------------<  117<H>  4.1   |  19.0<L>  |  0.81    Ca    8.3<L>      26 Jul 2023 06:10  Phos  2.9     07-26  Mg     2.0     07-26

## 2023-07-30 NOTE — PROGRESS NOTE ADULT - NS ATTEND AMEND GEN_ALL_CORE FT
Agree with above
Agree with above  maintain drainage/ ab's  check cultures  oob
examined at bedside  reviewed labs  discussed with patient  planned for second stage

## 2023-07-31 ENCOUNTER — TRANSCRIPTION ENCOUNTER (OUTPATIENT)
Age: 38
End: 2023-07-31

## 2023-08-01 ENCOUNTER — INPATIENT (INPATIENT)
Facility: HOSPITAL | Age: 38
LOS: 3 days | Discharge: HOME | DRG: 660 | End: 2023-08-05
Attending: STUDENT IN AN ORGANIZED HEALTH CARE EDUCATION/TRAINING PROGRAM | Admitting: STUDENT IN AN ORGANIZED HEALTH CARE EDUCATION/TRAINING PROGRAM
Payer: COMMERCIAL

## 2023-08-01 ENCOUNTER — APPOINTMENT (OUTPATIENT)
Dept: UROLOGY | Facility: HOSPITAL | Age: 38
End: 2023-08-01
Payer: MEDICAID

## 2023-08-01 ENCOUNTER — RESULT REVIEW (OUTPATIENT)
Age: 38
End: 2023-08-01

## 2023-08-01 VITALS
SYSTOLIC BLOOD PRESSURE: 125 MMHG | OXYGEN SATURATION: 98 % | HEART RATE: 89 BPM | RESPIRATION RATE: 16 BRPM | WEIGHT: 270.95 LBS | DIASTOLIC BLOOD PRESSURE: 88 MMHG | TEMPERATURE: 98 F | HEIGHT: 62.5 IN

## 2023-08-01 DIAGNOSIS — N20.0 CALCULUS OF KIDNEY: ICD-10-CM

## 2023-08-01 DIAGNOSIS — Z98.890 OTHER SPECIFIED POSTPROCEDURAL STATES: Chronic | ICD-10-CM

## 2023-08-01 DIAGNOSIS — Z90.49 ACQUIRED ABSENCE OF OTHER SPECIFIED PARTS OF DIGESTIVE TRACT: Chronic | ICD-10-CM

## 2023-08-01 LAB
AGGLUTINATION: PRESENT — SIGNIFICANT CHANGE UP
ANION GAP SERPL CALC-SCNC: 15 MMOL/L — SIGNIFICANT CHANGE UP (ref 5–17)
BASOPHILS # BLD AUTO: 0 K/UL — SIGNIFICANT CHANGE UP (ref 0–0.2)
BASOPHILS NFR BLD AUTO: 0 % — SIGNIFICANT CHANGE UP (ref 0–2)
BUN SERPL-MCNC: 12.9 MG/DL — SIGNIFICANT CHANGE UP (ref 8–20)
CALCIUM SERPL-MCNC: 8.3 MG/DL — LOW (ref 8.4–10.5)
CHLORIDE SERPL-SCNC: 106 MMOL/L — SIGNIFICANT CHANGE UP (ref 96–108)
CO2 SERPL-SCNC: 16 MMOL/L — LOW (ref 22–29)
CREAT SERPL-MCNC: 0.9 MG/DL — SIGNIFICANT CHANGE UP (ref 0.5–1.3)
EGFR: 83 ML/MIN/1.73M2 — SIGNIFICANT CHANGE UP
EOSINOPHIL # BLD AUTO: 0 K/UL — SIGNIFICANT CHANGE UP (ref 0–0.5)
EOSINOPHIL NFR BLD AUTO: 0 % — SIGNIFICANT CHANGE UP (ref 0–6)
GIANT PLATELETS BLD QL SMEAR: PRESENT — SIGNIFICANT CHANGE UP
GLUCOSE BLDC GLUCOMTR-MCNC: 145 MG/DL — HIGH (ref 70–99)
GLUCOSE BLDC GLUCOMTR-MCNC: 89 MG/DL — SIGNIFICANT CHANGE UP (ref 70–99)
GLUCOSE BLDC GLUCOMTR-MCNC: 98 MG/DL — SIGNIFICANT CHANGE UP (ref 70–99)
GLUCOSE SERPL-MCNC: 134 MG/DL — HIGH (ref 70–99)
HCT VFR BLD CALC: 37.1 % — SIGNIFICANT CHANGE UP (ref 34.5–45)
HGB BLD-MCNC: 11.6 G/DL — SIGNIFICANT CHANGE UP (ref 11.5–15.5)
LYMPHOCYTES # BLD AUTO: 11.3 % — LOW (ref 13–44)
LYMPHOCYTES # BLD AUTO: 2.1 K/UL — SIGNIFICANT CHANGE UP (ref 1–3.3)
MANUAL SMEAR VERIFICATION: SIGNIFICANT CHANGE UP
MCHC RBC-ENTMCNC: 30.7 PG — SIGNIFICANT CHANGE UP (ref 27–34)
MCHC RBC-ENTMCNC: 31.3 GM/DL — LOW (ref 32–36)
MCV RBC AUTO: 98.1 FL — SIGNIFICANT CHANGE UP (ref 80–100)
METAMYELOCYTES # FLD: 3.5 % — HIGH (ref 0–0)
MONOCYTES # BLD AUTO: 0.33 K/UL — SIGNIFICANT CHANGE UP (ref 0–0.9)
MONOCYTES NFR BLD AUTO: 1.8 % — LOW (ref 2–14)
MYELOCYTES NFR BLD: 1.7 % — HIGH (ref 0–0)
NEUTROPHILS # BLD AUTO: 15.15 K/UL — HIGH (ref 1.8–7.4)
NEUTROPHILS NFR BLD AUTO: 79.1 % — HIGH (ref 43–77)
NEUTS BAND # BLD: 2.6 % — SIGNIFICANT CHANGE UP (ref 0–8)
PLAT MORPH BLD: NORMAL — SIGNIFICANT CHANGE UP
PLATELET # BLD AUTO: 380 K/UL — SIGNIFICANT CHANGE UP (ref 150–400)
POIKILOCYTOSIS BLD QL AUTO: SLIGHT — SIGNIFICANT CHANGE UP
POLYCHROMASIA BLD QL SMEAR: SLIGHT — SIGNIFICANT CHANGE UP
POTASSIUM SERPL-MCNC: 5.1 MMOL/L — SIGNIFICANT CHANGE UP (ref 3.5–5.3)
POTASSIUM SERPL-SCNC: 5.1 MMOL/L — SIGNIFICANT CHANGE UP (ref 3.5–5.3)
RBC # BLD: 3.78 M/UL — LOW (ref 3.8–5.2)
RBC # FLD: 13.3 % — SIGNIFICANT CHANGE UP (ref 10.3–14.5)
RBC BLD AUTO: ABNORMAL
SODIUM SERPL-SCNC: 137 MMOL/L — SIGNIFICANT CHANGE UP (ref 135–145)
WBC # BLD: 18.54 K/UL — HIGH (ref 3.8–10.5)
WBC # FLD AUTO: 18.54 K/UL — HIGH (ref 3.8–10.5)

## 2023-08-01 PROCEDURE — 71045 X-RAY EXAM CHEST 1 VIEW: CPT | Mod: 26

## 2023-08-01 PROCEDURE — 88300 SURGICAL PATH GROSS: CPT | Mod: 26

## 2023-08-01 PROCEDURE — 52334 CREATE PASSAGE TO KIDNEY: CPT | Mod: RT,58,59

## 2023-08-01 PROCEDURE — 52352 CYSTOURETERO W/STONE REMOVE: CPT | Mod: LT,59,58

## 2023-08-01 PROCEDURE — 50437 DILAT XST TRC NEW ACCESS RCS: CPT | Mod: RT,58,59

## 2023-08-01 PROCEDURE — 50081 PERQ NL/PL LITHOTRP CPLX>2CM: CPT | Mod: RT,22,58

## 2023-08-01 PROCEDURE — 74420 UROGRAPHY RTRGR +-KUB: CPT | Mod: 26,59

## 2023-08-01 DEVICE — IMPLANTABLE DEVICE: Type: IMPLANTABLE DEVICE | Site: RIGHT | Status: FUNCTIONAL

## 2023-08-01 DEVICE — URETERAL STENT CONTOUR 7FR 26CM: Type: IMPLANTABLE DEVICE | Site: RIGHT | Status: FUNCTIONAL

## 2023-08-01 DEVICE — FLOSEAL FAST PREP 10ML: Type: IMPLANTABLE DEVICE | Site: RIGHT | Status: FUNCTIONAL

## 2023-08-01 DEVICE — STONE BASKET ZEROTIP NITINOL 4-WIRE 1.9FR 120CM X 12MM: Type: IMPLANTABLE DEVICE | Site: RIGHT | Status: FUNCTIONAL

## 2023-08-01 DEVICE — GWIRE AMPLATZ SUPER STIFF .035INX145CM: Type: IMPLANTABLE DEVICE | Site: RIGHT | Status: FUNCTIONAL

## 2023-08-01 DEVICE — LASER FIBER SOLTIVE 365: Type: IMPLANTABLE DEVICE | Site: RIGHT | Status: FUNCTIONAL

## 2023-08-01 DEVICE — GUIDEWIRE SENSOR DUAL-FLEX NITINOL STRAIGHT .035" X 150CM: Type: IMPLANTABLE DEVICE | Site: RIGHT | Status: FUNCTIONAL

## 2023-08-01 DEVICE — URETERAL STENT CONTOUR 7FR 28CM: Type: IMPLANTABLE DEVICE | Site: RIGHT | Status: FUNCTIONAL

## 2023-08-01 DEVICE — URETERAL CATH OPEN END FLEXI-TIP 5FR .038" X 70CM: Type: IMPLANTABLE DEVICE | Site: RIGHT | Status: FUNCTIONAL

## 2023-08-01 RX ORDER — OXYCODONE HYDROCHLORIDE 5 MG/1
5 TABLET ORAL ONCE
Refills: 0 | Status: DISCONTINUED | OUTPATIENT
Start: 2023-08-01 | End: 2023-08-01

## 2023-08-01 RX ORDER — KETOROLAC TROMETHAMINE 30 MG/ML
15 SYRINGE (ML) INJECTION ONCE
Refills: 0 | Status: DISCONTINUED | OUTPATIENT
Start: 2023-08-01 | End: 2023-08-01

## 2023-08-01 RX ORDER — OXYCODONE HYDROCHLORIDE 5 MG/1
10 TABLET ORAL EVERY 6 HOURS
Refills: 0 | Status: DISCONTINUED | OUTPATIENT
Start: 2023-08-01 | End: 2023-08-05

## 2023-08-01 RX ORDER — SODIUM CHLORIDE 9 MG/ML
1000 INJECTION, SOLUTION INTRAVENOUS ONCE
Refills: 0 | Status: COMPLETED | OUTPATIENT
Start: 2023-08-01 | End: 2023-08-01

## 2023-08-01 RX ORDER — CEFTRIAXONE 500 MG/1
2000 INJECTION, POWDER, FOR SOLUTION INTRAMUSCULAR; INTRAVENOUS EVERY 24 HOURS
Refills: 0 | Status: DISCONTINUED | OUTPATIENT
Start: 2023-08-01 | End: 2023-08-01

## 2023-08-01 RX ORDER — ACETAMINOPHEN 500 MG
1000 TABLET ORAL ONCE
Refills: 0 | Status: DISCONTINUED | OUTPATIENT
Start: 2023-08-01 | End: 2023-08-01

## 2023-08-01 RX ORDER — ONDANSETRON 8 MG/1
4 TABLET, FILM COATED ORAL EVERY 6 HOURS
Refills: 0 | Status: DISCONTINUED | OUTPATIENT
Start: 2023-08-01 | End: 2023-08-05

## 2023-08-01 RX ORDER — KETOROLAC TROMETHAMINE 30 MG/ML
15 SYRINGE (ML) INJECTION EVERY 6 HOURS
Refills: 0 | Status: DISCONTINUED | OUTPATIENT
Start: 2023-08-01 | End: 2023-08-05

## 2023-08-01 RX ORDER — CEFAZOLIN SODIUM 1 G
1000 VIAL (EA) INJECTION EVERY 8 HOURS
Refills: 0 | Status: DISCONTINUED | OUTPATIENT
Start: 2023-08-01 | End: 2023-08-01

## 2023-08-01 RX ORDER — HYDROMORPHONE HYDROCHLORIDE 2 MG/ML
0.5 INJECTION INTRAMUSCULAR; INTRAVENOUS; SUBCUTANEOUS
Refills: 0 | Status: DISCONTINUED | OUTPATIENT
Start: 2023-08-01 | End: 2023-08-01

## 2023-08-01 RX ORDER — SODIUM CHLORIDE 9 MG/ML
1000 INJECTION, SOLUTION INTRAVENOUS
Refills: 0 | Status: DISCONTINUED | OUTPATIENT
Start: 2023-08-01 | End: 2023-08-02

## 2023-08-01 RX ORDER — ACETAMINOPHEN 500 MG
1000 TABLET ORAL ONCE
Refills: 0 | Status: COMPLETED | OUTPATIENT
Start: 2023-08-01 | End: 2023-08-01

## 2023-08-01 RX ORDER — HEPARIN SODIUM 5000 [USP'U]/ML
5000 INJECTION INTRAVENOUS; SUBCUTANEOUS EVERY 8 HOURS
Refills: 0 | Status: DISCONTINUED | OUTPATIENT
Start: 2023-08-01 | End: 2023-08-05

## 2023-08-01 RX ORDER — SENNA PLUS 8.6 MG/1
2 TABLET ORAL AT BEDTIME
Refills: 0 | Status: DISCONTINUED | OUTPATIENT
Start: 2023-08-01 | End: 2023-08-05

## 2023-08-01 RX ORDER — OXYCODONE HYDROCHLORIDE 5 MG/1
5 TABLET ORAL EVERY 6 HOURS
Refills: 0 | Status: DISCONTINUED | OUTPATIENT
Start: 2023-08-01 | End: 2023-08-05

## 2023-08-01 RX ORDER — SODIUM CHLORIDE 9 MG/ML
1000 INJECTION, SOLUTION INTRAVENOUS
Refills: 0 | Status: DISCONTINUED | OUTPATIENT
Start: 2023-08-01 | End: 2023-08-01

## 2023-08-01 RX ORDER — ONDANSETRON 8 MG/1
4 TABLET, FILM COATED ORAL ONCE
Refills: 0 | Status: DISCONTINUED | OUTPATIENT
Start: 2023-08-01 | End: 2023-08-01

## 2023-08-01 RX ORDER — HYDROMORPHONE HYDROCHLORIDE 2 MG/ML
0.25 INJECTION INTRAMUSCULAR; INTRAVENOUS; SUBCUTANEOUS
Refills: 0 | Status: DISCONTINUED | OUTPATIENT
Start: 2023-08-01 | End: 2023-08-01

## 2023-08-01 RX ORDER — CEFTRIAXONE 500 MG/1
1000 INJECTION, POWDER, FOR SOLUTION INTRAMUSCULAR; INTRAVENOUS EVERY 24 HOURS
Refills: 0 | Status: DISCONTINUED | OUTPATIENT
Start: 2023-08-02 | End: 2023-08-02

## 2023-08-01 RX ORDER — ACETAMINOPHEN 500 MG
975 TABLET ORAL EVERY 6 HOURS
Refills: 0 | Status: DISCONTINUED | OUTPATIENT
Start: 2023-08-01 | End: 2023-08-05

## 2023-08-01 RX ADMIN — OXYCODONE HYDROCHLORIDE 10 MILLIGRAM(S): 5 TABLET ORAL at 19:22

## 2023-08-01 RX ADMIN — CEFTRIAXONE 2000 MILLIGRAM(S): 500 INJECTION, POWDER, FOR SOLUTION INTRAMUSCULAR; INTRAVENOUS at 09:00

## 2023-08-01 RX ADMIN — HYDROMORPHONE HYDROCHLORIDE 0.5 MILLIGRAM(S): 2 INJECTION INTRAMUSCULAR; INTRAVENOUS; SUBCUTANEOUS at 12:21

## 2023-08-01 RX ADMIN — Medication 400 MILLIGRAM(S): at 19:31

## 2023-08-01 RX ADMIN — SODIUM CHLORIDE 1000 MILLILITER(S): 9 INJECTION, SOLUTION INTRAVENOUS at 19:26

## 2023-08-01 RX ADMIN — OXYCODONE HYDROCHLORIDE 5 MILLIGRAM(S): 5 TABLET ORAL at 13:11

## 2023-08-01 RX ADMIN — Medication 15 MILLIGRAM(S): at 22:00

## 2023-08-01 RX ADMIN — Medication 15 MILLIGRAM(S): at 14:05

## 2023-08-01 RX ADMIN — ONDANSETRON 4 MILLIGRAM(S): 8 TABLET, FILM COATED ORAL at 19:55

## 2023-08-01 RX ADMIN — Medication 975 MILLIGRAM(S): at 08:01

## 2023-08-01 RX ADMIN — HEPARIN SODIUM 5000 UNIT(S): 5000 INJECTION INTRAVENOUS; SUBCUTANEOUS at 21:11

## 2023-08-01 RX ADMIN — OXYCODONE HYDROCHLORIDE 5 MILLIGRAM(S): 5 TABLET ORAL at 13:31

## 2023-08-01 RX ADMIN — HYDROMORPHONE HYDROCHLORIDE 0.5 MILLIGRAM(S): 2 INJECTION INTRAMUSCULAR; INTRAVENOUS; SUBCUTANEOUS at 12:04

## 2023-08-01 RX ADMIN — SODIUM CHLORIDE 75 MILLILITER(S): 9 INJECTION, SOLUTION INTRAVENOUS at 14:07

## 2023-08-01 RX ADMIN — Medication 15 MILLIGRAM(S): at 21:10

## 2023-08-01 RX ADMIN — SODIUM CHLORIDE 75 MILLILITER(S): 9 INJECTION, SOLUTION INTRAVENOUS at 11:59

## 2023-08-01 RX ADMIN — HEPARIN SODIUM 5000 UNIT(S): 5000 INJECTION INTRAVENOUS; SUBCUTANEOUS at 13:07

## 2023-08-01 RX ADMIN — Medication 15 MILLIGRAM(S): at 14:20

## 2023-08-01 NOTE — PATIENT PROFILE ADULT - FALL HARM RISK - HARM RISK INTERVENTIONS

## 2023-08-01 NOTE — ASU PREOP CHECKLIST - ALLERGY BAND ON
no known allergies Internal Medicine Progress Note    Patient is a 54y old  Female who presents with a chief complaint of abdominal pain & nausea/vomiting (01 Oct 2022 07:13)    OVERNIGHT EVENTS/SUBJECTIVE:    OBJECTIVE:  Vital Signs Last 24 Hrs  T(C): 37.2 (02 Oct 2022 04:06), Max: 37.2 (02 Oct 2022 04:06)  T(F): 99 (02 Oct 2022 04:06), Max: 99 (02 Oct 2022 04:06)  HR: 113 (02 Oct 2022 04:06) (113 - 113)  BP: 77/51 (02 Oct 2022 04:06) (77/51 - 96/67)  BP(mean): --  RR: 17 (02 Oct 2022 04:06) (16 - 17)  SpO2: 94% (02 Oct 2022 04:06) (94% - 96%)    Parameters below as of 02 Oct 2022 04:06  Patient On (Oxygen Delivery Method): nasal cannula  O2 Flow (L/min): 2    I&O's Detail    Daily     Daily   Physical Exam:  General: NAD, resting comfortably in bed  Neuro: A&Ox4, 5/5 strength in all ext  HEENT: NC/AT, EOMI, normal hearing, oral mucosa moist, no oral lesions noted, no pharyngeal erythema, uvula midline  Neck: supple, thyroid not enlarged, no LAD  Resp: Breathing comfortably on RA, LCTA b/l  CV: Normal sinus rhythm, S1 and S2, no r/m/g  Abd: soft, non-distended, non-tender. No HSM.  Ext: ROMIx4, no edema, +2 pulses bilaterally  Skin: Warm and dry, no rashes or discolorations  Psych: Appropriate affect    Medications:  MEDICATIONS  (STANDING):  Biotene Dry Mouth Oral Rinse 5 milliLiter(s) Swish and Spit two times a day  HYDROmorphone Infusion 0.5 mG/Hr (0.5 mL/Hr) IV Continuous <Continuous>  lidocaine   4% Patch 1 Patch Transdermal daily  pantoprazole  Injectable 40 milliGRAM(s) IV Push daily    MEDICATIONS  (PRN):  bisacodyl Suppository 10 milliGRAM(s) Rectal daily PRN Constipation  HYDROmorphone  Injectable 0.5 milliGRAM(s) IV Push every 2 hours PRN Respiratory distress  HYDROmorphone  Injectable 1.5 milliGRAM(s) IV Push every 2 hours PRN Breakthrough  LORazepam   Injectable 0.5 milliGRAM(s) IV Push every 6 hours PRN restlessness/agitation/anxiety  LORazepam   Injectable 0.5 milliGRAM(s) IV Push every 6 hours PRN Nausea and/or Vomiting  ondansetron Injectable 8 milliGRAM(s) IV Push every 8 hours PRN Nausea and/or Vomiting  polyethylene glycol 3350 17 Gram(s) Oral daily PRN Constipation      Labs:              COVID-19 PCR: NotDetec (27 Sep 2022 12:24)  COVID-19 PCR: NotDetec (26 Sep 2022 05:43)  SARS-CoV-2: NotDetec (22 Sep 2022 11:13)  COVID-19 PCR: NotDetec (19 Sep 2022 12:50)  COVID-19 PCR: NotDetec (14 Sep 2022 13:05)  SARS-CoV-2: NotDetec (08 Sep 2022 18:15)  COVID-19 PCR: NotDetec (08 Sep 2022 07:31)  COVID-19 PCR: NotDetec (02 Sep 2022 06:00)  COVID-19 PCR: NotDetec (25 Aug 2022 08:02)  COVID-19 PCR: NotDetec (21 Aug 2022 06:49)  COVID-19 PCR: NotDetec (14 Aug 2022 17:47)      Radiology: Internal Medicine Progress Note    Patient is a 54y old  Female who presents with a chief complaint of abdominal pain & nausea/vomiting (01 Oct 2022 07:13)    OVERNIGHT EVENTS/SUBJECTIVE: No overnight events. Pt not responding to interview this am.     OBJECTIVE:  Vital Signs Last 24 Hrs  T(C): 37.2 (02 Oct 2022 04:06), Max: 37.2 (02 Oct 2022 04:06)  T(F): 99 (02 Oct 2022 04:06), Max: 99 (02 Oct 2022 04:06)  HR: 113 (02 Oct 2022 04:06) (113 - 113)  BP: 77/51 (02 Oct 2022 04:06) (77/51 - 96/67)  BP(mean): --  RR: 17 (02 Oct 2022 04:06) (16 - 17)  SpO2: 94% (02 Oct 2022 04:06) (94% - 96%)    Parameters below as of 02 Oct 2022 04:06  Patient On (Oxygen Delivery Method): nasal cannula  O2 Flow (L/min): 2    I&O's Detail    Daily     Daily   Physical Exam:  General: NAD, resting in bed  HEENT: NC/AT  Neck: supple, thyroid not enlarged, no LAD  Resp: Breathing with increased effort       Medications:  MEDICATIONS  (STANDING):  Biotene Dry Mouth Oral Rinse 5 milliLiter(s) Swish and Spit two times a day  HYDROmorphone Infusion 0.5 mG/Hr (0.5 mL/Hr) IV Continuous <Continuous>  lidocaine   4% Patch 1 Patch Transdermal daily  pantoprazole  Injectable 40 milliGRAM(s) IV Push daily    MEDICATIONS  (PRN):  bisacodyl Suppository 10 milliGRAM(s) Rectal daily PRN Constipation  HYDROmorphone  Injectable 0.5 milliGRAM(s) IV Push every 2 hours PRN Respiratory distress  HYDROmorphone  Injectable 1.5 milliGRAM(s) IV Push every 2 hours PRN Breakthrough  LORazepam   Injectable 0.5 milliGRAM(s) IV Push every 6 hours PRN restlessness/agitation/anxiety  LORazepam   Injectable 0.5 milliGRAM(s) IV Push every 6 hours PRN Nausea and/or Vomiting  ondansetron Injectable 8 milliGRAM(s) IV Push every 8 hours PRN Nausea and/or Vomiting  polyethylene glycol 3350 17 Gram(s) Oral daily PRN Constipation      Labs:        COVID-19 PCR: NotDetec (27 Sep 2022 12:24)  COVID-19 PCR: NotDetec (26 Sep 2022 05:43)  SARS-CoV-2: NotDetec (22 Sep 2022 11:13)  COVID-19 PCR: NotDetec (19 Sep 2022 12:50)  COVID-19 PCR: NotDetec (14 Sep 2022 13:05)  SARS-CoV-2: NotDetec (08 Sep 2022 18:15)  COVID-19 PCR: NotDetec (08 Sep 2022 07:31)  COVID-19 PCR: NotDetec (02 Sep 2022 06:00)  COVID-19 PCR: NotDetec (25 Aug 2022 08:02)  COVID-19 PCR: NotDetec (21 Aug 2022 06:49)  COVID-19 PCR: NotDetec (14 Aug 2022 17:47)      Radiology:

## 2023-08-01 NOTE — PATIENT PROFILE ADULT - IS PATIENT PREGNANT?
"For the past 4 months he has been waking up in the morning with a \"sensitive stomach\".  He states that it feels more queasy.  Occasional he will wake up and vomit.  This happens maybe once a week.  He has recently moved in with his girlfriend, who has very poor eating habits.  He does also state that he knows when he eats a lot at night-after he smokes marijuana-he will wake up feeling nauseated and queasy.  This does not happen every time.  He does however wake up daily feeling nauseated and having an unsettled stomach.  It does resolve throughout the day.  He does not note any associated abdominal pain.  Denies constipation, diarrhea, melena, hematochezia, hematemesis.  "
He had fairly moderate asthma as a child.  He has outgrown this.  He very rarely will become wheezy.  Has not had an albuterol inhaler for a few years now.  Is requesting to have one on hand.  Denies cough, chest congestion, shortness of breath.  
no

## 2023-08-01 NOTE — ASU PREOP CHECKLIST - AS BP NONINV METHOD
Call [employer] at  [phone] to obtain a primary care physician  They will be able to schedule you with a physician who sees patients with your insurance and physicians who see patients without insurance 
electronic

## 2023-08-01 NOTE — BRIEF OPERATIVE NOTE - OPERATION/FINDINGS
left ureteroscopy - basket removal of stone, no stent  right mini PCNL: 1-1-200  difficult access due to high kidney  7/28 stent with no string  no robert

## 2023-08-01 NOTE — CHART NOTE - NSCHARTNOTEFT_GEN_A_CORE
POC    pt s/p right PCNL, stent insertion, left ureteroscopy, renal stone removal  AVSS  pt admitted overnight for observation, pain control and IVF   resting comfortably, voiding, pain controlled  right flank dressing intact    a/p:  s/p R PCNL, left ureteroscopy  abx  pain meds  OOB to chair  incentive spirometer

## 2023-08-01 NOTE — BRIEF OPERATIVE NOTE - NSICDXBRIEFPROCEDURE_GEN_ALL_CORE_FT
PROCEDURES:  Cystoscopy with percutaneous nephrolithotomy 01-Aug-2023 11:14:24  MaddieJesu  Ureteroscopy 01-Aug-2023 11:14:29  Maddie, Jesu

## 2023-08-02 LAB
ABO RH CONFIRMATION: SIGNIFICANT CHANGE UP
ANION GAP SERPL CALC-SCNC: 16 MMOL/L — SIGNIFICANT CHANGE UP (ref 5–17)
BASOPHILS # BLD AUTO: 0.05 K/UL — SIGNIFICANT CHANGE UP (ref 0–0.2)
BASOPHILS NFR BLD AUTO: 0.3 % — SIGNIFICANT CHANGE UP (ref 0–2)
BUN SERPL-MCNC: 12 MG/DL — SIGNIFICANT CHANGE UP (ref 8–20)
CALCIUM SERPL-MCNC: 8.3 MG/DL — LOW (ref 8.4–10.5)
CHLORIDE SERPL-SCNC: 102 MMOL/L — SIGNIFICANT CHANGE UP (ref 96–108)
CO2 SERPL-SCNC: 18 MMOL/L — LOW (ref 22–29)
CREAT SERPL-MCNC: 1.04 MG/DL — SIGNIFICANT CHANGE UP (ref 0.5–1.3)
EGFR: 70 ML/MIN/1.73M2 — SIGNIFICANT CHANGE UP
EOSINOPHIL # BLD AUTO: 0.06 K/UL — SIGNIFICANT CHANGE UP (ref 0–0.5)
EOSINOPHIL NFR BLD AUTO: 0.4 % — SIGNIFICANT CHANGE UP (ref 0–6)
GLUCOSE SERPL-MCNC: 111 MG/DL — HIGH (ref 70–99)
HCG UR QL: NEGATIVE — SIGNIFICANT CHANGE UP
HCT VFR BLD CALC: 25.9 % — LOW (ref 34.5–45)
HCT VFR BLD CALC: 26.1 % — LOW (ref 34.5–45)
HGB BLD-MCNC: 8.5 G/DL — LOW (ref 11.5–15.5)
HGB BLD-MCNC: 8.7 G/DL — LOW (ref 11.5–15.5)
IMM GRANULOCYTES NFR BLD AUTO: 2.5 % — HIGH (ref 0–0.9)
LYMPHOCYTES # BLD AUTO: 12.9 % — LOW (ref 13–44)
LYMPHOCYTES # BLD AUTO: 2.2 K/UL — SIGNIFICANT CHANGE UP (ref 1–3.3)
MCHC RBC-ENTMCNC: 31.1 PG — SIGNIFICANT CHANGE UP (ref 27–34)
MCHC RBC-ENTMCNC: 31.4 PG — SIGNIFICANT CHANGE UP (ref 27–34)
MCHC RBC-ENTMCNC: 32.8 GM/DL — SIGNIFICANT CHANGE UP (ref 32–36)
MCHC RBC-ENTMCNC: 33.3 GM/DL — SIGNIFICANT CHANGE UP (ref 32–36)
MCV RBC AUTO: 94.2 FL — SIGNIFICANT CHANGE UP (ref 80–100)
MCV RBC AUTO: 94.9 FL — SIGNIFICANT CHANGE UP (ref 80–100)
MONOCYTES # BLD AUTO: 1.2 K/UL — HIGH (ref 0–0.9)
MONOCYTES NFR BLD AUTO: 7.1 % — SIGNIFICANT CHANGE UP (ref 2–14)
NEUTROPHILS # BLD AUTO: 13.06 K/UL — HIGH (ref 1.8–7.4)
NEUTROPHILS NFR BLD AUTO: 76.8 % — SIGNIFICANT CHANGE UP (ref 43–77)
PLATELET # BLD AUTO: 339 K/UL — SIGNIFICANT CHANGE UP (ref 150–400)
PLATELET # BLD AUTO: 341 K/UL — SIGNIFICANT CHANGE UP (ref 150–400)
POTASSIUM SERPL-MCNC: 4.2 MMOL/L — SIGNIFICANT CHANGE UP (ref 3.5–5.3)
POTASSIUM SERPL-SCNC: 4.2 MMOL/L — SIGNIFICANT CHANGE UP (ref 3.5–5.3)
RBC # BLD: 2.73 M/UL — LOW (ref 3.8–5.2)
RBC # BLD: 2.77 M/UL — LOW (ref 3.8–5.2)
RBC # FLD: 13.7 % — SIGNIFICANT CHANGE UP (ref 10.3–14.5)
SODIUM SERPL-SCNC: 136 MMOL/L — SIGNIFICANT CHANGE UP (ref 135–145)
WBC # BLD: 16.73 K/UL — HIGH (ref 3.8–10.5)
WBC # BLD: 17 K/UL — HIGH (ref 3.8–10.5)
WBC # FLD AUTO: 16.73 K/UL — HIGH (ref 3.8–10.5)
WBC # FLD AUTO: 17 K/UL — HIGH (ref 3.8–10.5)

## 2023-08-02 PROCEDURE — 74176 CT ABD & PELVIS W/O CONTRAST: CPT | Mod: 26

## 2023-08-02 RX ORDER — FLUCONAZOLE 150 MG/1
200 TABLET ORAL DAILY
Refills: 0 | Status: DISCONTINUED | OUTPATIENT
Start: 2023-08-02 | End: 2023-08-02

## 2023-08-02 RX ORDER — PIPERACILLIN AND TAZOBACTAM 4; .5 G/20ML; G/20ML
3.38 INJECTION, POWDER, LYOPHILIZED, FOR SOLUTION INTRAVENOUS ONCE
Refills: 0 | Status: COMPLETED | OUTPATIENT
Start: 2023-08-02 | End: 2023-08-02

## 2023-08-02 RX ORDER — SODIUM CHLORIDE 9 MG/ML
1000 INJECTION, SOLUTION INTRAVENOUS
Refills: 0 | Status: DISCONTINUED | OUTPATIENT
Start: 2023-08-02 | End: 2023-08-05

## 2023-08-02 RX ORDER — SODIUM CHLORIDE 9 MG/ML
1000 INJECTION, SOLUTION INTRAVENOUS ONCE
Refills: 0 | Status: COMPLETED | OUTPATIENT
Start: 2023-08-02 | End: 2023-08-02

## 2023-08-02 RX ORDER — FLUCONAZOLE 150 MG/1
200 TABLET ORAL DAILY
Refills: 0 | Status: DISCONTINUED | OUTPATIENT
Start: 2023-08-02 | End: 2023-08-05

## 2023-08-02 RX ORDER — PIPERACILLIN AND TAZOBACTAM 4; .5 G/20ML; G/20ML
3.38 INJECTION, POWDER, LYOPHILIZED, FOR SOLUTION INTRAVENOUS EVERY 8 HOURS
Refills: 0 | Status: DISCONTINUED | OUTPATIENT
Start: 2023-08-02 | End: 2023-08-05

## 2023-08-02 RX ORDER — ACETAMINOPHEN 500 MG
1000 TABLET ORAL ONCE
Refills: 0 | Status: COMPLETED | OUTPATIENT
Start: 2023-08-02 | End: 2023-08-02

## 2023-08-02 RX ADMIN — Medication 975 MILLIGRAM(S): at 21:50

## 2023-08-02 RX ADMIN — Medication 400 MILLIGRAM(S): at 06:32

## 2023-08-02 RX ADMIN — Medication 15 MILLIGRAM(S): at 23:54

## 2023-08-02 RX ADMIN — HEPARIN SODIUM 5000 UNIT(S): 5000 INJECTION INTRAVENOUS; SUBCUTANEOUS at 13:47

## 2023-08-02 RX ADMIN — Medication 975 MILLIGRAM(S): at 13:07

## 2023-08-02 RX ADMIN — PIPERACILLIN AND TAZOBACTAM 25 GRAM(S): 4; .5 INJECTION, POWDER, LYOPHILIZED, FOR SOLUTION INTRAVENOUS at 22:32

## 2023-08-02 RX ADMIN — SODIUM CHLORIDE 100 MILLILITER(S): 9 INJECTION, SOLUTION INTRAVENOUS at 13:43

## 2023-08-02 RX ADMIN — PIPERACILLIN AND TAZOBACTAM 25 GRAM(S): 4; .5 INJECTION, POWDER, LYOPHILIZED, FOR SOLUTION INTRAVENOUS at 18:08

## 2023-08-02 RX ADMIN — OXYCODONE HYDROCHLORIDE 10 MILLIGRAM(S): 5 TABLET ORAL at 04:04

## 2023-08-02 RX ADMIN — Medication 15 MILLIGRAM(S): at 07:02

## 2023-08-02 RX ADMIN — Medication 975 MILLIGRAM(S): at 20:52

## 2023-08-02 RX ADMIN — OXYCODONE HYDROCHLORIDE 10 MILLIGRAM(S): 5 TABLET ORAL at 12:35

## 2023-08-02 RX ADMIN — Medication 1000 MILLIGRAM(S): at 07:02

## 2023-08-02 RX ADMIN — Medication 15 MILLIGRAM(S): at 12:49

## 2023-08-02 RX ADMIN — FLUCONAZOLE 200 MILLIGRAM(S): 150 TABLET ORAL at 13:45

## 2023-08-02 RX ADMIN — Medication 15 MILLIGRAM(S): at 11:49

## 2023-08-02 RX ADMIN — PIPERACILLIN AND TAZOBACTAM 200 GRAM(S): 4; .5 INJECTION, POWDER, LYOPHILIZED, FOR SOLUTION INTRAVENOUS at 06:00

## 2023-08-02 RX ADMIN — SODIUM CHLORIDE 1000 MILLILITER(S): 9 INJECTION, SOLUTION INTRAVENOUS at 06:32

## 2023-08-02 RX ADMIN — OXYCODONE HYDROCHLORIDE 10 MILLIGRAM(S): 5 TABLET ORAL at 11:35

## 2023-08-02 RX ADMIN — OXYCODONE HYDROCHLORIDE 10 MILLIGRAM(S): 5 TABLET ORAL at 03:08

## 2023-08-02 RX ADMIN — Medication 15 MILLIGRAM(S): at 06:32

## 2023-08-02 RX ADMIN — HEPARIN SODIUM 5000 UNIT(S): 5000 INJECTION INTRAVENOUS; SUBCUTANEOUS at 06:34

## 2023-08-02 RX ADMIN — Medication 15 MILLIGRAM(S): at 18:43

## 2023-08-02 RX ADMIN — Medication 15 MILLIGRAM(S): at 22:33

## 2023-08-02 RX ADMIN — PIPERACILLIN AND TAZOBACTAM 25 GRAM(S): 4; .5 INJECTION, POWDER, LYOPHILIZED, FOR SOLUTION INTRAVENOUS at 11:49

## 2023-08-02 RX ADMIN — HEPARIN SODIUM 5000 UNIT(S): 5000 INJECTION INTRAVENOUS; SUBCUTANEOUS at 22:33

## 2023-08-02 RX ADMIN — Medication 15 MILLIGRAM(S): at 17:43

## 2023-08-02 RX ADMIN — Medication 975 MILLIGRAM(S): at 14:07

## 2023-08-02 NOTE — PROGRESS NOTE ADULT - ASSESSMENT
38 yo female POD#1 s/p R PCNL with stent placement, L ureteroscopy, removal of renal stone, febrile  - abx changed to zosyn, diflucan added  - blood cx's  - repeat cbc  - T/S  - CT reviewed: tiny fragment in right kidney, stent in place, small amount of fluid in right chest base, most likely blood  - incentive spirometer  - OOB/ambulate  - am labs  - d/c planning once fevers subside and h/h stable

## 2023-08-03 LAB
ANION GAP SERPL CALC-SCNC: 13 MMOL/L — SIGNIFICANT CHANGE UP (ref 5–17)
BUN SERPL-MCNC: 9.5 MG/DL — SIGNIFICANT CHANGE UP (ref 8–20)
CALCIUM SERPL-MCNC: 8.6 MG/DL — SIGNIFICANT CHANGE UP (ref 8.4–10.5)
CHLORIDE SERPL-SCNC: 103 MMOL/L — SIGNIFICANT CHANGE UP (ref 96–108)
CO2 SERPL-SCNC: 21 MMOL/L — LOW (ref 22–29)
CREAT SERPL-MCNC: 1 MG/DL — SIGNIFICANT CHANGE UP (ref 0.5–1.3)
CULTURE RESULTS: SIGNIFICANT CHANGE UP
EGFR: 73 ML/MIN/1.73M2 — SIGNIFICANT CHANGE UP
GLUCOSE SERPL-MCNC: 109 MG/DL — HIGH (ref 70–99)
HCT VFR BLD CALC: 27.4 % — LOW (ref 34.5–45)
HGB BLD-MCNC: 9.2 G/DL — LOW (ref 11.5–15.5)
MCHC RBC-ENTMCNC: 31.5 PG — SIGNIFICANT CHANGE UP (ref 27–34)
MCHC RBC-ENTMCNC: 33.6 GM/DL — SIGNIFICANT CHANGE UP (ref 32–36)
MCV RBC AUTO: 93.8 FL — SIGNIFICANT CHANGE UP (ref 80–100)
PLATELET # BLD AUTO: 371 K/UL — SIGNIFICANT CHANGE UP (ref 150–400)
POTASSIUM SERPL-MCNC: 4.1 MMOL/L — SIGNIFICANT CHANGE UP (ref 3.5–5.3)
POTASSIUM SERPL-SCNC: 4.1 MMOL/L — SIGNIFICANT CHANGE UP (ref 3.5–5.3)
RBC # BLD: 2.92 M/UL — LOW (ref 3.8–5.2)
RBC # FLD: 13.7 % — SIGNIFICANT CHANGE UP (ref 10.3–14.5)
SODIUM SERPL-SCNC: 137 MMOL/L — SIGNIFICANT CHANGE UP (ref 135–145)
SPECIMEN SOURCE: SIGNIFICANT CHANGE UP
SURGICAL PATHOLOGY STUDY: SIGNIFICANT CHANGE UP
WBC # BLD: 18.63 K/UL — HIGH (ref 3.8–10.5)
WBC # FLD AUTO: 18.63 K/UL — HIGH (ref 3.8–10.5)

## 2023-08-03 PROCEDURE — 71275 CT ANGIOGRAPHY CHEST: CPT | Mod: 26

## 2023-08-03 RX ORDER — ACETAMINOPHEN 500 MG
975 TABLET ORAL ONCE
Refills: 0 | Status: COMPLETED | OUTPATIENT
Start: 2023-08-03 | End: 2023-08-03

## 2023-08-03 RX ADMIN — Medication 15 MILLIGRAM(S): at 18:40

## 2023-08-03 RX ADMIN — Medication 15 MILLIGRAM(S): at 06:16

## 2023-08-03 RX ADMIN — HEPARIN SODIUM 5000 UNIT(S): 5000 INJECTION INTRAVENOUS; SUBCUTANEOUS at 15:38

## 2023-08-03 RX ADMIN — Medication 15 MILLIGRAM(S): at 05:38

## 2023-08-03 RX ADMIN — HEPARIN SODIUM 5000 UNIT(S): 5000 INJECTION INTRAVENOUS; SUBCUTANEOUS at 05:38

## 2023-08-03 RX ADMIN — HEPARIN SODIUM 5000 UNIT(S): 5000 INJECTION INTRAVENOUS; SUBCUTANEOUS at 21:28

## 2023-08-03 RX ADMIN — PIPERACILLIN AND TAZOBACTAM 25 GRAM(S): 4; .5 INJECTION, POWDER, LYOPHILIZED, FOR SOLUTION INTRAVENOUS at 15:38

## 2023-08-03 RX ADMIN — PIPERACILLIN AND TAZOBACTAM 25 GRAM(S): 4; .5 INJECTION, POWDER, LYOPHILIZED, FOR SOLUTION INTRAVENOUS at 05:38

## 2023-08-03 RX ADMIN — Medication 15 MILLIGRAM(S): at 13:22

## 2023-08-03 RX ADMIN — Medication 15 MILLIGRAM(S): at 23:24

## 2023-08-03 RX ADMIN — Medication 975 MILLIGRAM(S): at 15:24

## 2023-08-03 RX ADMIN — Medication 975 MILLIGRAM(S): at 15:23

## 2023-08-03 RX ADMIN — PIPERACILLIN AND TAZOBACTAM 25 GRAM(S): 4; .5 INJECTION, POWDER, LYOPHILIZED, FOR SOLUTION INTRAVENOUS at 23:24

## 2023-08-03 RX ADMIN — FLUCONAZOLE 200 MILLIGRAM(S): 150 TABLET ORAL at 12:12

## 2023-08-03 RX ADMIN — Medication 15 MILLIGRAM(S): at 12:13

## 2023-08-03 NOTE — CHART NOTE - NSCHARTNOTEFT_GEN_A_CORE
Called by RN after patient complained of SOB and having new onset headache. Vitals taken at that time had patient febrile to 103, HR in 120s, O2sat to 92 on RA. Patient assessed at bedside, placed on NC 2L, tylenol given for fever,  started. Patient stated she started having ear/jaw pain and a headache before feeling febrile, states she knows she has "dental problems" and has an appt coming up this month with an endodontist. ENT assessed and does not think she has internal or external ear infection.   Will continue to monitor closely  Continuing iV zosyn prophylactically  To be discussed with Dr. Perkins, further workup pending

## 2023-08-04 ENCOUNTER — TRANSCRIPTION ENCOUNTER (OUTPATIENT)
Age: 38
End: 2023-08-04

## 2023-08-04 LAB
ANION GAP SERPL CALC-SCNC: 12 MMOL/L — SIGNIFICANT CHANGE UP (ref 5–17)
BLD GP AB SCN SERPL QL: SIGNIFICANT CHANGE UP
BUN SERPL-MCNC: 11.6 MG/DL — SIGNIFICANT CHANGE UP (ref 8–20)
CALCIUM SERPL-MCNC: 8.4 MG/DL — SIGNIFICANT CHANGE UP (ref 8.4–10.5)
CHLORIDE SERPL-SCNC: 105 MMOL/L — SIGNIFICANT CHANGE UP (ref 96–108)
CO2 SERPL-SCNC: 22 MMOL/L — SIGNIFICANT CHANGE UP (ref 22–29)
CREAT SERPL-MCNC: 0.98 MG/DL — SIGNIFICANT CHANGE UP (ref 0.5–1.3)
EGFR: 75 ML/MIN/1.73M2 — SIGNIFICANT CHANGE UP
GLUCOSE SERPL-MCNC: 93 MG/DL — SIGNIFICANT CHANGE UP (ref 70–99)
HCT VFR BLD CALC: 23.6 % — LOW (ref 34.5–45)
HCT VFR BLD CALC: 27 % — LOW (ref 34.5–45)
HGB BLD-MCNC: 7.8 G/DL — LOW (ref 11.5–15.5)
HGB BLD-MCNC: 8.8 G/DL — LOW (ref 11.5–15.5)
MCHC RBC-ENTMCNC: 30.7 PG — SIGNIFICANT CHANGE UP (ref 27–34)
MCHC RBC-ENTMCNC: 31 PG — SIGNIFICANT CHANGE UP (ref 27–34)
MCHC RBC-ENTMCNC: 32.6 GM/DL — SIGNIFICANT CHANGE UP (ref 32–36)
MCHC RBC-ENTMCNC: 33.1 GM/DL — SIGNIFICANT CHANGE UP (ref 32–36)
MCV RBC AUTO: 93.7 FL — SIGNIFICANT CHANGE UP (ref 80–100)
MCV RBC AUTO: 94.1 FL — SIGNIFICANT CHANGE UP (ref 80–100)
PLATELET # BLD AUTO: 383 K/UL — SIGNIFICANT CHANGE UP (ref 150–400)
PLATELET # BLD AUTO: 437 K/UL — HIGH (ref 150–400)
POTASSIUM SERPL-MCNC: 4 MMOL/L — SIGNIFICANT CHANGE UP (ref 3.5–5.3)
POTASSIUM SERPL-SCNC: 4 MMOL/L — SIGNIFICANT CHANGE UP (ref 3.5–5.3)
RBC # BLD: 2.52 M/UL — LOW (ref 3.8–5.2)
RBC # BLD: 2.87 M/UL — LOW (ref 3.8–5.2)
RBC # FLD: 13.5 % — SIGNIFICANT CHANGE UP (ref 10.3–14.5)
RBC # FLD: 13.6 % — SIGNIFICANT CHANGE UP (ref 10.3–14.5)
SODIUM SERPL-SCNC: 139 MMOL/L — SIGNIFICANT CHANGE UP (ref 135–145)
WBC # BLD: 16.09 K/UL — HIGH (ref 3.8–10.5)
WBC # BLD: 16.18 K/UL — HIGH (ref 3.8–10.5)
WBC # FLD AUTO: 16.09 K/UL — HIGH (ref 3.8–10.5)
WBC # FLD AUTO: 16.18 K/UL — HIGH (ref 3.8–10.5)

## 2023-08-04 RX ADMIN — HEPARIN SODIUM 5000 UNIT(S): 5000 INJECTION INTRAVENOUS; SUBCUTANEOUS at 20:51

## 2023-08-04 RX ADMIN — Medication 15 MILLIGRAM(S): at 06:46

## 2023-08-04 RX ADMIN — HEPARIN SODIUM 5000 UNIT(S): 5000 INJECTION INTRAVENOUS; SUBCUTANEOUS at 05:51

## 2023-08-04 RX ADMIN — Medication 15 MILLIGRAM(S): at 00:20

## 2023-08-04 RX ADMIN — Medication 15 MILLIGRAM(S): at 13:32

## 2023-08-04 RX ADMIN — Medication 15 MILLIGRAM(S): at 13:45

## 2023-08-04 RX ADMIN — Medication 15 MILLIGRAM(S): at 05:51

## 2023-08-04 RX ADMIN — FLUCONAZOLE 200 MILLIGRAM(S): 150 TABLET ORAL at 13:33

## 2023-08-04 RX ADMIN — PIPERACILLIN AND TAZOBACTAM 25 GRAM(S): 4; .5 INJECTION, POWDER, LYOPHILIZED, FOR SOLUTION INTRAVENOUS at 13:33

## 2023-08-04 RX ADMIN — PIPERACILLIN AND TAZOBACTAM 25 GRAM(S): 4; .5 INJECTION, POWDER, LYOPHILIZED, FOR SOLUTION INTRAVENOUS at 08:33

## 2023-08-04 RX ADMIN — HEPARIN SODIUM 5000 UNIT(S): 5000 INJECTION INTRAVENOUS; SUBCUTANEOUS at 13:34

## 2023-08-04 RX ADMIN — Medication 975 MILLIGRAM(S): at 20:43

## 2023-08-04 RX ADMIN — PIPERACILLIN AND TAZOBACTAM 25 GRAM(S): 4; .5 INJECTION, POWDER, LYOPHILIZED, FOR SOLUTION INTRAVENOUS at 20:44

## 2023-08-04 RX ADMIN — Medication 975 MILLIGRAM(S): at 13:34

## 2023-08-04 RX ADMIN — Medication 975 MILLIGRAM(S): at 14:20

## 2023-08-04 RX ADMIN — Medication 15 MILLIGRAM(S): at 18:10

## 2023-08-04 RX ADMIN — Medication 15 MILLIGRAM(S): at 17:52

## 2023-08-05 ENCOUNTER — TRANSCRIPTION ENCOUNTER (OUTPATIENT)
Age: 38
End: 2023-08-05

## 2023-08-05 VITALS
TEMPERATURE: 99 F | SYSTOLIC BLOOD PRESSURE: 113 MMHG | HEART RATE: 92 BPM | DIASTOLIC BLOOD PRESSURE: 79 MMHG | OXYGEN SATURATION: 98 % | RESPIRATION RATE: 18 BRPM

## 2023-08-05 LAB
BASOPHILS # BLD AUTO: 0.05 K/UL — SIGNIFICANT CHANGE UP (ref 0–0.2)
BASOPHILS NFR BLD AUTO: 0.4 % — SIGNIFICANT CHANGE UP (ref 0–2)
EOSINOPHIL # BLD AUTO: 0.26 K/UL — SIGNIFICANT CHANGE UP (ref 0–0.5)
EOSINOPHIL NFR BLD AUTO: 2.2 % — SIGNIFICANT CHANGE UP (ref 0–6)
HCT VFR BLD CALC: 25.7 % — LOW (ref 34.5–45)
HGB BLD-MCNC: 8.5 G/DL — LOW (ref 11.5–15.5)
IMM GRANULOCYTES NFR BLD AUTO: 4.4 % — HIGH (ref 0–0.9)
LYMPHOCYTES # BLD AUTO: 2.61 K/UL — SIGNIFICANT CHANGE UP (ref 1–3.3)
LYMPHOCYTES # BLD AUTO: 22 % — SIGNIFICANT CHANGE UP (ref 13–44)
MAGNESIUM SERPL-MCNC: 1.9 MG/DL — SIGNIFICANT CHANGE UP (ref 1.6–2.6)
MCHC RBC-ENTMCNC: 30.8 PG — SIGNIFICANT CHANGE UP (ref 27–34)
MCHC RBC-ENTMCNC: 33.1 GM/DL — SIGNIFICANT CHANGE UP (ref 32–36)
MCV RBC AUTO: 93.1 FL — SIGNIFICANT CHANGE UP (ref 80–100)
MONOCYTES # BLD AUTO: 0.7 K/UL — SIGNIFICANT CHANGE UP (ref 0–0.9)
MONOCYTES NFR BLD AUTO: 5.9 % — SIGNIFICANT CHANGE UP (ref 2–14)
NEUTROPHILS # BLD AUTO: 7.72 K/UL — HIGH (ref 1.8–7.4)
NEUTROPHILS NFR BLD AUTO: 65.1 % — SIGNIFICANT CHANGE UP (ref 43–77)
PHOSPHATE SERPL-MCNC: 4.6 MG/DL — SIGNIFICANT CHANGE UP (ref 2.4–4.7)
PLATELET # BLD AUTO: 477 K/UL — HIGH (ref 150–400)
RBC # BLD: 2.76 M/UL — LOW (ref 3.8–5.2)
RBC # FLD: 13.5 % — SIGNIFICANT CHANGE UP (ref 10.3–14.5)
WBC # BLD: 11.86 K/UL — HIGH (ref 3.8–10.5)
WBC # FLD AUTO: 11.86 K/UL — HIGH (ref 3.8–10.5)

## 2023-08-05 PROCEDURE — 86901 BLOOD TYPING SEROLOGIC RH(D): CPT

## 2023-08-05 PROCEDURE — 81025 URINE PREGNANCY TEST: CPT

## 2023-08-05 PROCEDURE — 85027 COMPLETE CBC AUTOMATED: CPT

## 2023-08-05 PROCEDURE — 71275 CT ANGIOGRAPHY CHEST: CPT

## 2023-08-05 PROCEDURE — 88300 SURGICAL PATH GROSS: CPT

## 2023-08-05 PROCEDURE — 87040 BLOOD CULTURE FOR BACTERIA: CPT

## 2023-08-05 PROCEDURE — 80048 BASIC METABOLIC PNL TOTAL CA: CPT

## 2023-08-05 PROCEDURE — C2617: CPT

## 2023-08-05 PROCEDURE — 84100 ASSAY OF PHOSPHORUS: CPT

## 2023-08-05 PROCEDURE — C9399: CPT

## 2023-08-05 PROCEDURE — 71045 X-RAY EXAM CHEST 1 VIEW: CPT

## 2023-08-05 PROCEDURE — C1758: CPT

## 2023-08-05 PROCEDURE — 86850 RBC ANTIBODY SCREEN: CPT

## 2023-08-05 PROCEDURE — 82962 GLUCOSE BLOOD TEST: CPT

## 2023-08-05 PROCEDURE — 85025 COMPLETE CBC W/AUTO DIFF WBC: CPT

## 2023-08-05 PROCEDURE — 83735 ASSAY OF MAGNESIUM: CPT

## 2023-08-05 PROCEDURE — 36415 COLL VENOUS BLD VENIPUNCTURE: CPT

## 2023-08-05 PROCEDURE — 74176 CT ABD & PELVIS W/O CONTRAST: CPT | Mod: MB

## 2023-08-05 PROCEDURE — C1889: CPT

## 2023-08-05 PROCEDURE — 82365 CALCULUS SPECTROSCOPY: CPT

## 2023-08-05 PROCEDURE — 76000 FLUOROSCOPY <1 HR PHYS/QHP: CPT

## 2023-08-05 PROCEDURE — C1769: CPT

## 2023-08-05 PROCEDURE — 86900 BLOOD TYPING SEROLOGIC ABO: CPT

## 2023-08-05 RX ORDER — FLUCONAZOLE 150 MG/1
1 TABLET ORAL
Qty: 10 | Refills: 0
Start: 2023-08-05 | End: 2023-08-14

## 2023-08-05 RX ORDER — ACETAMINOPHEN 500 MG
3 TABLET ORAL
Qty: 0 | Refills: 0 | DISCHARGE
Start: 2023-08-05

## 2023-08-05 RX ORDER — CEFPODOXIME PROXETIL 100 MG
1 TABLET ORAL
Qty: 20 | Refills: 0
Start: 2023-08-05 | End: 2023-08-14

## 2023-08-05 RX ADMIN — Medication 15 MILLIGRAM(S): at 05:54

## 2023-08-05 RX ADMIN — PIPERACILLIN AND TAZOBACTAM 25 GRAM(S): 4; .5 INJECTION, POWDER, LYOPHILIZED, FOR SOLUTION INTRAVENOUS at 05:58

## 2023-08-05 RX ADMIN — Medication 15 MILLIGRAM(S): at 14:28

## 2023-08-05 RX ADMIN — HEPARIN SODIUM 5000 UNIT(S): 5000 INJECTION INTRAVENOUS; SUBCUTANEOUS at 05:59

## 2023-08-05 RX ADMIN — FLUCONAZOLE 200 MILLIGRAM(S): 150 TABLET ORAL at 13:31

## 2023-08-05 RX ADMIN — Medication 15 MILLIGRAM(S): at 13:28

## 2023-08-05 RX ADMIN — Medication 15 MILLIGRAM(S): at 00:44

## 2023-08-05 NOTE — DISCHARGE NOTE PROVIDER - NSDCCPCAREPLAN_GEN_ALL_CORE_FT
PRINCIPAL DISCHARGE DIAGNOSIS  Diagnosis: Renal calculus, bilateral  Assessment and Plan of Treatment: - right PCNL, stent insertion, left ureteroscopy, removal of stone  - no heavy lifting or straining  - no baths, hots tubs or pools  - you may shower  - call the office if you develo fevers, chills, nausea or vomiting, change your back dressings with dry gauze as needed, remove when no longer draining

## 2023-08-05 NOTE — DISCHARGE NOTE PROVIDER - NSDCMRMEDTOKEN_GEN_ALL_CORE_FT
acetaminophen 325 mg oral tablet: 3 tab(s) orally every 6 hours As needed Temp greater or equal to 38C (100.4F), Mild Pain (1 - 3)  cefpodoxime 200 mg oral tablet: 1 tab(s) orally 2 times a day  fluconazole 200 mg oral tablet: 1 tab(s) orally once a day  levothyroxine 25 mcg (0.025 mg) oral tablet: 1 tab(s) orally once a day  tamsulosin 0.4 mg oral capsule: 1 cap(s) orally once a day MDD: 1 cap

## 2023-08-05 NOTE — DISCHARGE NOTE PROVIDER - HOSPITAL COURSE
36 yo female with h/o multiple renal calculi. Pt had a recent left PCNL last week.  Pt had a right PCNL 8/1, right stent insertion, left ureteroscopy, removal of small renal stone.  Pt was admitted for pain control.  POD#1 pt spiked fevers to 103'F.  Antibiotics were switched from rocephin to Zosyn, fluconazole was added for fungal coverage.  Pt spiked fevers on and off, POD#2 pt had some SOB, tachycardia and fever again. CTA performed, suboptimal study, no PE seen.  Pt's fevers subsided, vital signs remained stable, O2 sat's high 90's. Pt remains afebrile and is stable and ready for d/c to home on vantin and fluconazole.  Pt will f/u as OP with Dr. Perkins.

## 2023-08-05 NOTE — DISCHARGE NOTE PROVIDER - CARE PROVIDER_API CALL
Jesu Perkins  Urology  [address], [address]  [place] [address]  Phone: [phone]  Fax: [phone]  Follow Up Time: 1 week

## 2023-08-05 NOTE — DISCHARGE NOTE PROVIDER - NSDCCPTREATMENT_GEN_ALL_CORE_FT
PRINCIPAL PROCEDURE  Procedure: Percutaneous nephrostolithotomy with fluoroscopic guidance  Findings and Treatment: with stent insertion      SECONDARY PROCEDURE  Procedure: Left rigid ureteroscopy  Findings and Treatment: removal of stone

## 2023-08-05 NOTE — DISCHARGE NOTE NURSING/CASE MANAGEMENT/SOCIAL WORK - PATIENT PORTAL LINK FT
You can access the FollowMyHealth Patient Portal offered by [facility] by registering at the following website: [email or web address]. By joining [employer]’s FollowMyHealth portal, you will also be able to view your health information using other applications (apps) compatible with our system.

## 2023-08-05 NOTE — PROGRESS NOTE ADULT - SUBJECTIVE AND OBJECTIVE BOX
Subjective: Patient seen at bedside, no current complaints, no overnight events, denies n/v/cp/sob. Tolerating diet, urinating freely, WBC slight increase from 16 to 18 today, last fever yesterday evening around 8pm, afebrile since, patient encouraged to ambulate and use IS      MEDICATIONS  (STANDING):  fluconAZOLE   Tablet 200 milliGRAM(s) Oral daily  heparin   Injectable 5000 Unit(s) SubCutaneous every 8 hours  ketorolac   Injectable 15 milliGRAM(s) IV Push every 6 hours  lactated ringers. 1000 milliLiter(s) (100 mL/Hr) IV Continuous <Continuous>  piperacillin/tazobactam IVPB.. 3.375 Gram(s) IV Intermittent every 8 hours    MEDICATIONS  (PRN):  acetaminophen     Tablet .. 975 milliGRAM(s) Oral every 6 hours PRN Temp greater or equal to 38C (100.4F), Mild Pain (1 - 3)  ondansetron Injectable 4 milliGRAM(s) IV Push every 6 hours PRN Nausea and/or Vomiting  oxyCODONE    IR 10 milliGRAM(s) Oral every 6 hours PRN Severe Pain (7 - 10)  oxyCODONE    IR 5 milliGRAM(s) Oral every 6 hours PRN Moderate Pain (4 - 6)  senna 2 Tablet(s) Oral at bedtime PRN Constipation      Vital Signs Last 24 Hrs  T(C): 36.6 (03 Aug 2023 04:54), Max: 38.1 (02 Aug 2023 20:48)  T(F): 97.9 (03 Aug 2023 04:54), Max: 100.6 (02 Aug 2023 20:48)  HR: 110 (03 Aug 2023 04:54) (99 - 113)  BP: 124/87 (03 Aug 2023 04:54) (101/71 - 132/77)  BP(mean): --  RR: 18 (03 Aug 2023 04:54) (18 - 20)  SpO2: 94% (03 Aug 2023 04:54) (92% - 94%)    Parameters below as of 03 Aug 2023 04:54  Patient On (Oxygen Delivery Method): room air        Physical Exam:    Constitutional: NAD  HEENT: PERRL, EOMI  Respiratory: Respirations non-labored, no accessory muscle use  Gastrointestinal: Soft, non-tender, non-distended  Neurological: A&O x 3  : bilateral flank dressings clean and in place, no flank or abdominal tenderness      LABS:                        9.2    18.63 )-----------( 371      ( 03 Aug 2023 05:00 )             27.4     08-03    137  |  103  |  9.5  ----------------------------<  109<H>  4.1   |  21.0<L>  |  1.00    Ca    8.6      03 Aug 2023 05:00        Urinalysis Basic - ( 03 Aug 2023 05:00 )    Color: x / Appearance: x / SG: x / pH: x  Gluc: 109 mg/dL / Ketone: x  / Bili: x / Urobili: x   Blood: x / Protein: x / Nitrite: x   Leuk Esterase: x / RBC: x / WBC x   Sq Epi: x / Non Sq Epi: x / Bacteria: x        A: 36 yo female POD#2 s/p R PCNL with stent placement, L ureteroscopy, removal of renal stone, afebrile since 8PM last night  - Contiunue Abx  - Monitor WBC  - blood cx's with no growth  - Hgb stable/increased  - OOB/ambulate  - d/c planning      
Subjective: Patient seen at bedside, no current complaints, no overnight events,    MEDICATIONS  (STANDING):  fluconAZOLE   Tablet 200 milliGRAM(s) Oral daily  heparin   Injectable 5000 Unit(s) SubCutaneous every 8 hours  ketorolac   Injectable 15 milliGRAM(s) IV Push every 6 hours  lactated ringers. 1000 milliLiter(s) (100 mL/Hr) IV Continuous <Continuous>  piperacillin/tazobactam IVPB.. 3.375 Gram(s) IV Intermittent every 8 hours    MEDICATIONS  (PRN):  acetaminophen     Tablet .. 975 milliGRAM(s) Oral every 6 hours PRN Temp greater or equal to 38C (100.4F), Mild Pain (1 - 3)  ondansetron Injectable 4 milliGRAM(s) IV Push every 6 hours PRN Nausea and/or Vomiting  oxyCODONE    IR 5 milliGRAM(s) Oral every 6 hours PRN Moderate Pain (4 - 6)  oxyCODONE    IR 10 milliGRAM(s) Oral every 6 hours PRN Severe Pain (7 - 10)  senna 2 Tablet(s) Oral at bedtime PRN Constipation    Vital Signs Last 24 Hrs  T(C): 37.1 (05 Aug 2023 08:38), Max: 37.2 (04 Aug 2023 21:20)  T(F): 98.8 (05 Aug 2023 08:38), Max: 99 (04 Aug 2023 21:20)  HR: 103 (05 Aug 2023 08:38) (82 - 103)  BP: 127/67 (05 Aug 2023 08:38) (100/61 - 131/77)  BP(mean): --  RR: 18 (05 Aug 2023 08:38) (18 - 20)  SpO2: 99% (05 Aug 2023 08:38) (96% - 99%)    Parameters below as of 05 Aug 2023 08:38  Patient On (Oxygen Delivery Method): room air          Physical Exam:    Constitutional: NAD  HEENT: PERRL, EOMI  Respiratory: Respirations non-labored, no accessory muscle use  Gastrointestinal: Soft, non-tender, non-distended  Neurological: A&O x 3  : Bilateral NT sites mildly tender to touch, dressings in place with minimal drainage      LABS:                                   8.5    11.86 )-----------( 477      ( 05 Aug 2023 05:20 )             25.7   08-04    139  |  105  |  11.6  ----------------------------<  93  4.0   |  22.0  |  0.98    Ca    8.4      04 Aug 2023 05:34  Phos  4.6     08-05  Mg     1.9     08-05          Urinalysis Basic - ( 04 Aug 2023 05:34 )    Color: x / Appearance: x / SG: x / pH: x  Gluc: 93 mg/dL / Ketone: x  / Bili: x / Urobili: x   Blood: x / Protein: x / Nitrite: x   Leuk Esterase: x / RBC: x / WBC x   Sq Epi: x / Non Sq Epi: x / Bacteria: x        A: 38 yo female POD#4 s/p R PCNL with stent placement, L ureteroscopy, removal of renal stone  - Contiunue Abx  - Monitor WBC, trending down  - blood cx's with no growth  - OOB/ambulate  - d/c planning    
Subjective: Patient seen at bedside, no current complaints, no overnight events, denies n/v/cp/sob. Tolerating diet, urinating freely, states she is feeling much better than yesterday. WBC trending down, CT chest from yesterday showed no changes, effusion stable.       MEDICATIONS  (STANDING):  fluconAZOLE   Tablet 200 milliGRAM(s) Oral daily  heparin   Injectable 5000 Unit(s) SubCutaneous every 8 hours  ketorolac   Injectable 15 milliGRAM(s) IV Push every 6 hours  lactated ringers. 1000 milliLiter(s) (100 mL/Hr) IV Continuous <Continuous>  piperacillin/tazobactam IVPB.. 3.375 Gram(s) IV Intermittent every 8 hours    MEDICATIONS  (PRN):  acetaminophen     Tablet .. 975 milliGRAM(s) Oral every 6 hours PRN Temp greater or equal to 38C (100.4F), Mild Pain (1 - 3)  ondansetron Injectable 4 milliGRAM(s) IV Push every 6 hours PRN Nausea and/or Vomiting  oxyCODONE    IR 5 milliGRAM(s) Oral every 6 hours PRN Moderate Pain (4 - 6)  oxyCODONE    IR 10 milliGRAM(s) Oral every 6 hours PRN Severe Pain (7 - 10)  senna 2 Tablet(s) Oral at bedtime PRN Constipation      Vital Signs Last 24 Hrs  T(C): 36.9 (04 Aug 2023 11:06), Max: 38.2 (03 Aug 2023 16:59)  T(F): 98.4 (04 Aug 2023 11:06), Max: 100.7 (03 Aug 2023 16:59)  HR: 94 (04 Aug 2023 11:06) (84 - 103)  BP: 131/77 (04 Aug 2023 11:06) (100/68 - 131/77)  BP(mean): --  RR: 20 (04 Aug 2023 11:06) (18 - 20)  SpO2: 97% (04 Aug 2023 11:06) (96% - 98%)    Parameters below as of 04 Aug 2023 11:06  Patient On (Oxygen Delivery Method): room air        Physical Exam:    Constitutional: NAD  HEENT: PERRL, EOMI  Respiratory: Respirations non-labored, no accessory muscle use  Gastrointestinal: Soft, non-tender, non-distended  Neurological: A&O x 3  : Bilateral NT sites mildly tender to touch, dressings in place with minimal drainage      LABS:                        8.8    16.18 )-----------( 437      ( 04 Aug 2023 11:28 )             27.0     08-04    139  |  105  |  11.6  ----------------------------<  93  4.0   |  22.0  |  0.98    Ca    8.4      04 Aug 2023 05:34        Urinalysis Basic - ( 04 Aug 2023 05:34 )    Color: x / Appearance: x / SG: x / pH: x  Gluc: 93 mg/dL / Ketone: x  / Bili: x / Urobili: x   Blood: x / Protein: x / Nitrite: x   Leuk Esterase: x / RBC: x / WBC x   Sq Epi: x / Non Sq Epi: x / Bacteria: x        A: 38 yo female POD#3 s/p R PCNL with stent placement, L ureteroscopy, removal of renal stone, feeling better than yesterday, no further SOB or fevers since yesterday evening  - Contiunue Abx  - Monitor WBC, trending down  - blood cx's with no growth  - Hgb stable  - OOB/ambulate  - d/c planning    
Subjective:37yFemale POD#1 s/p R PCNL with stent placement, L ureteroscopy, removal of renal stone.  Pt spiked fever last night and again this am 103.2' F. Pt received ofirmev, feels better.  Pt has no c/o pain at this time.  Pt voiding, no n/v, tolerating diet.   used: #[id number]    Vital Signs Last 24 Hrs  T(C): 36.8 (02 Aug 2023 07:30), Max: 39.6 (02 Aug 2023 06:19)  T(F): 98.3 (02 Aug 2023 07:30), Max: 103.2 (02 Aug 2023 06:19)  HR: 110 (02 Aug 2023 07:30) (79 - 120)  BP: 121/70 (02 Aug 2023 07:30) (104/63 - 155/77)  BP(mean): 89 (01 Aug 2023 13:20) (78 - 92)  RR: 18 (02 Aug 2023 07:30) (18 - 20)  SpO2: 93% (02 Aug 2023 07:30) (93% - 100%)    Parameters below as of 02 Aug 2023 07:30  Patient On (Oxygen Delivery Method): room air      I&O's Detail    01 Aug 2023 07:01  -  02 Aug 2023 07:00  --------------------------------------------------------  IN:    Lactated Ringers: 975 mL    Oral Fluid: 240 mL  Total IN: 1215 mL    OUT:  Total OUT: 0 mL    Total NET: 1215 mL          Labs:                        8.7    16.73 )-----------( 341      ( 02 Aug 2023 09:15 )             26.1     08-02    136  |  102  |  12.0  ----------------------------<  111<H>  4.2   |  18.0<L>  |  1.04    Ca    8.3<L>      02 Aug 2023 07:00

## 2023-08-05 NOTE — DISCHARGE NOTE NURSING/CASE MANAGEMENT/SOCIAL WORK - NSDCPETBCESMAN_GEN_ALL_CORE
If you are a smoker, it is important for your health to stop smoking. Please be aware that second hand smoke is also harmful.
suicide attempt one week ago

## 2023-08-07 LAB
CULTURE RESULTS: SIGNIFICANT CHANGE UP
SPECIMEN SOURCE: SIGNIFICANT CHANGE UP

## 2023-08-09 NOTE — ED ADULT TRIAGE NOTE - BMI (KG/M2)
light.   Neck:      Thyroid: No thyromegaly. Vascular: No carotid bruit or JVD. Cardiovascular:      Rate and Rhythm: Normal rate and regular rhythm. Pulses: Normal pulses. Carotid pulses are 2+ on the right side and 2+ on the left side. Radial pulses are 2+ on the right side and 2+ on the left side. Heart sounds: Normal heart sounds, S1 normal and S2 normal. No murmur heard. Pulmonary:      Effort: Pulmonary effort is normal.      Breath sounds: Normal breath sounds. Abdominal:      General: Bowel sounds are normal. There is no distension or abdominal bruit. Palpations: Abdomen is soft. There is no mass. Hernia: No hernia is present. Musculoskeletal:      Cervical back: Full passive range of motion without pain, normal range of motion and neck supple. Lumbar back: Decreased range of motion. Right lower leg: No edema. Left lower leg: No edema. Comments: Needed cane for walking   Lymphadenopathy:      Cervical: No cervical adenopathy. Right cervical: No superficial cervical adenopathy. Left cervical: No superficial cervical adenopathy. Upper Body:      Right upper body: No supraclavicular adenopathy. Left upper body: No supraclavicular adenopathy. Skin:     General: Skin is warm and dry. Coloration: Skin is not pale. Findings: No lesion or rash. Nails: There is no clubbing. Neurological:      Mental Status: She is alert and oriented to person, place, and time. Motor: No weakness or tremor. Coordination: Coordination normal.      Gait: Gait abnormal.      Deep Tendon Reflexes: Reflexes are normal and symmetric. Comments: Walk very slow   Psychiatric:         Attention and Perception: Attention normal.         Mood and Affect: Mood normal.         Speech: Speech normal.         Behavior: Behavior normal. Behavior is cooperative. Thought Content:  Thought content normal.         Cognition and
28.3

## 2023-08-11 LAB
CELL MATERIAL STONE EST-MCNT: SIGNIFICANT CHANGE UP
LABORATORY COMMENT REPORT: SIGNIFICANT CHANGE UP
NIDUS STONE QN: SIGNIFICANT CHANGE UP

## 2023-08-18 ENCOUNTER — APPOINTMENT (OUTPATIENT)
Dept: UROLOGY | Facility: CLINIC | Age: 38
End: 2023-08-18
Payer: MEDICAID

## 2023-08-18 PROCEDURE — 52310 CYSTOSCOPY AND TREATMENT: CPT | Mod: 58

## 2023-08-18 NOTE — HISTORY OF PRESENT ILLNESS
[FreeTextEntry1] : 36 yo F see previous notes  s/p left PCNL followed by ureteroscopy, no stent s/p right PCNL with stent  stone: 80% Struvite (Magnesium Ammonium Phosphate), 20% Calcium Phosphate (Apatite)  cystoscopy and stent removal today (See note)  plan: - next visit in 1 month with new ultrasound and 24 hr urine

## 2023-08-21 LAB
BILIRUB UR QL STRIP: NORMAL
CLARITY UR: CLEAR
COLLECTION METHOD: NORMAL
GLUCOSE UR-MCNC: NORMAL
HCG UR QL: 0.2 EU/DL
HGB UR QL STRIP.AUTO: ABNORMAL
KETONES UR-MCNC: NORMAL
LEUKOCYTE ESTERASE UR QL STRIP: ABNORMAL
NITRITE UR QL STRIP: NORMAL
PH UR STRIP: 6
PROT UR STRIP-MCNC: ABNORMAL
SP GR UR STRIP: 1.02

## 2023-08-24 LAB
SURGICAL PATHOLOGY STUDY: SIGNIFICANT CHANGE UP

## 2023-08-28 LAB
SURGICAL PATHOLOGY STUDY: SIGNIFICANT CHANGE UP

## 2023-09-06 ENCOUNTER — APPOINTMENT (OUTPATIENT)
Dept: SURGERY | Facility: CLINIC | Age: 38
End: 2023-09-06
Payer: MEDICAID

## 2023-09-06 VITALS
SYSTOLIC BLOOD PRESSURE: 125 MMHG | HEART RATE: 79 BPM | HEIGHT: 62.5 IN | BODY MASS INDEX: 47.55 KG/M2 | DIASTOLIC BLOOD PRESSURE: 81 MMHG | TEMPERATURE: 98.1 F | RESPIRATION RATE: 16 BRPM | OXYGEN SATURATION: 100 % | WEIGHT: 265 LBS

## 2023-09-06 DIAGNOSIS — Z87.42 PERSONAL HISTORY OF OTHER DISEASES OF THE FEMALE GENITAL TRACT: ICD-10-CM

## 2023-09-06 DIAGNOSIS — Z86.39 PERSONAL HISTORY OF OTHER ENDOCRINE, NUTRITIONAL AND METABOLIC DISEASE: ICD-10-CM

## 2023-09-06 DIAGNOSIS — Z78.9 OTHER SPECIFIED HEALTH STATUS: ICD-10-CM

## 2023-09-06 PROCEDURE — 99204 OFFICE O/P NEW MOD 45 MIN: CPT

## 2023-09-06 NOTE — ASSESSMENT
[FreeTextEntry1] :  Patient with a BMI of  43 which places patient in the morbidly obese category.  Patient also suffers from DM type 2 and CLOVIS  .  This has directly contributed to patient's  current medical conditions as well as, a decreased quality of life.  Patient has very little chance of successfully losing and maintaining a significant amount of weight with non-surgical management, and would benefit from surgical intervention.  Patient meets the criteria for weight loss surgery as defined in the NIH consensus statement, surgery is medically necessary.  Given patients current BMI and obesity-related comorbidities, I feel the patient is a candidate for and would benefit from weight loss surgery, as all prior attempts by non-surgical means have been futile.  VTE Risk Calculation:  Does patient have PERSONAL history of VTE? n Does patient have PERSONAL history of myocardial infarction, cardiac stent, or acute coronary syndrome? n  Does patient have FAMILY history of VTE? n Does patient have FAMILY history of myocardial infarction, cardiac stent, or acute coronary syndrome? n   Screening for Sleep Apnea: Nightly snoring:  y Witnessed apnea:   y Nocturnal gasping:  y Morning dry mouth:  y Feeling unrefreshed on awakening from sleep:  y Excessive daytime sleepiness:  y

## 2023-09-06 NOTE — PHYSICAL EXAM
[Obese, well nourished, in no acute distress] : obese, well nourished, in no acute distress [Normal] : affect appropriate [de-identified] : Obese, protuberant. Soft, nontender, nondistended, no rebound or guarding.  Well-healed Pfannenstiel incision

## 2023-09-06 NOTE — HISTORY OF PRESENT ILLNESS
[de-identified] : Patient presents for bariatric surgery consultation. Patient has a long-standing history of severe obesity refractory to multiple prior attempts at weight loss including conservative treatments of diet and exercise. Patient has also developed increased risk or worsening obesity-related co-morbidities, including diabetes, sleep apnea.  She stared gaining significant weight in her 30's and has been steadily gaining most significant weight over the past two years.  In that time, she has developed Type 2 DM as well as CLOVIS.   Patient has been obese since her 30's, and the most weight that patient has been able to lose by any means is negligible and short-lived.  The patient has tried several weight loss programs including self-directed and physician or dietician-supervised diets.  Patient has not tried OTC or prescription weight loss medications due to side effects.  Patient has limited capacity for exercise due to excess weight.  She has very limited tolerance for activity, and gets short of breath after climbing a few steps or walking more than 2-3 yards.     She has a history of seizures but has not had any seizure activity since 2020 and her neurologist discontinued anti-convulsant meds.  Patient feels that weight loss surgery is the only option at this point for effective and clinically meaningful weight loss.  She has a friend who underwent a sleeve gastrectomy as in interested in same.

## 2023-09-06 NOTE — REVIEW OF SYSTEMS
[Patient Intake Form Reviewed] : Patient intake form was reviewed [Fever] : no fever [Chills] : no chills [Fatigue] : fatigue [Recent Change In Weight] : ~T recent weight change [Dysphagia] : no dysphagia [Odynophagia] : no odynophagia [Chest Pain] : chest pain [Palpitations] : palpitations [Leg Claudication] : no intermittent leg claudication [Lower Ext Edema] : lower extremity edema [Shortness Of Breath] : no shortness of breath [Wheezing] : no wheezing [Cough] : no cough [SOB on Exertion] : shortness of breath during exertion [Abdominal Pain] : no abdominal pain [Reflux/Heartburn] : no reflux/ heartburn [Joint Pain] : joint pain [Joint Stiffness] : joint stiffness [Skin Rash] : skin rash [Easy Bleeding] : no tendency for easy bleeding [Easy Bruising] : no tendency for easy bruising [Negative] : Heme/Lymph [FreeTextEntry5] : Severely Limited exercise tolerance [FreeTextEntry8] : H/O nephrolithiasis [de-identified] : Frequent irritation and redness under abdominal pannus

## 2023-09-06 NOTE — PLAN
[FreeTextEntry1] :  I have had a lengthy conversation with the patient and have discussed my impression and treatment plan with the patient.   The risks, benefits and alternatives, including laparoscopic gastric bypass, and laparoscopic vertical sleeve gastrectomy, were discussed at length and all of his questions were answered. The patient appears to understand and wishes to proceed.  The patient was given the following instructions:  1.	Patient needs a complete medical evaluation including cardiology evaluation,  pulmonary function test and evaluation for sleep apnea.  2.	Patient needs evaluation by GI including an upper endoscopy.  3.	Patient needs nutritional and psychological evaluations.  4.	Patient must attend a preoperative information meeting.  5.	Patient must undergo a right upper quadrant ultrasound, if there is no history of prior cholecystectomy.  	Screening for Obstructive Sleep apnea (performed today).  Patient does meet criteria for polysomnography testing 	Functional Assessment: Completely independent 	Smoking:  Patient does not smoke.  Smoking cessation counseling/resources discussed (if indicated) 	Substance Abuse:  Patient does not report use of illicit substances.  Counseling/resources discussed (if indicated)   The weight loss surgery information packet as well as the nutrition education packet have been reviewed and given to the patient, and I provided and reviewed detailed documents addressing these same topics. I have provided literature about the procedures and encouraged the patient to further research the surgeries, and patient feels well informed.   I also provided patient with detailed information regarding the pre-operative requirements with respect to testing, medical, nutritional and psychological evaluations and documentation of same.    Also discussed was importance of taking supplements and attending regular follow-up.  I reviewed importance of behavioral modification and follow-up in order to optimize outcomes and avoid complications. The patient is aware of the expected length of stay and discharge plan for a sleeve gastrectomy.  I encouraged the patient to maintain a diet and exercise program to promote optimum health for the surgical procedure and post-op course.   The patient clearly understood that surgery would only be scheduled if there are no medical or psychiatric contraindications and that follow up pre-operative office visits are required.  Patient agrees to begin a multi-disciplinary evaluation as discussed and provide required documentation and progress.  I informed patient that once all testing and evaluations (as deemed necessary) are completed, reviewed, and documentation received, this information to justify medical necessity for weight loss surgery will be submitted to insurance for pre-certification.    Patient will begin the pre-operative process with a visit to PCP, for whom detailed information regarding the necessary evaluations and documentation of obesity-related medical care was given to patient to share with PCP.    The patient had the opportunity to ask pertinent questions which were answered.  All of patients questions and concerns were addressed to patients satisfaction, and patient verbalized an understanding of the information discussed.

## 2023-09-07 PROBLEM — Z78.9 NON-SMOKER: Status: ACTIVE | Noted: 2023-05-11

## 2023-09-07 PROBLEM — Z87.42 HISTORY OF MENSTRUAL DISORDER: Status: RESOLVED | Noted: 2023-09-07 | Resolved: 2023-09-07

## 2023-09-07 PROBLEM — Z86.39 HISTORY OF THYROID DISORDER: Status: RESOLVED | Noted: 2023-05-11 | Resolved: 2023-09-07

## 2023-09-07 RX ORDER — LEVOTHYROXINE SODIUM 0.17 MG/1
TABLET ORAL
Refills: 0 | Status: ACTIVE | COMMUNITY

## 2023-09-14 ENCOUNTER — APPOINTMENT (OUTPATIENT)
Dept: ULTRASOUND IMAGING | Facility: CLINIC | Age: 38
End: 2023-09-14
Payer: MEDICAID

## 2023-09-14 ENCOUNTER — OUTPATIENT (OUTPATIENT)
Dept: OUTPATIENT SERVICES | Facility: HOSPITAL | Age: 38
LOS: 1 days | End: 2023-09-14

## 2023-09-14 DIAGNOSIS — Z90.49 ACQUIRED ABSENCE OF OTHER SPECIFIED PARTS OF DIGESTIVE TRACT: Chronic | ICD-10-CM

## 2023-09-14 DIAGNOSIS — E11.9 TYPE 2 DIABETES MELLITUS WITHOUT COMPLICATIONS: ICD-10-CM

## 2023-09-14 DIAGNOSIS — Z98.890 OTHER SPECIFIED POSTPROCEDURAL STATES: Chronic | ICD-10-CM

## 2023-09-14 PROCEDURE — 76705 ECHO EXAM OF ABDOMEN: CPT | Mod: 26

## 2023-09-22 ENCOUNTER — OUTPATIENT (OUTPATIENT)
Dept: OUTPATIENT SERVICES | Facility: HOSPITAL | Age: 38
LOS: 1 days | End: 2023-09-22
Payer: COMMERCIAL

## 2023-09-22 DIAGNOSIS — Z90.49 ACQUIRED ABSENCE OF OTHER SPECIFIED PARTS OF DIGESTIVE TRACT: Chronic | ICD-10-CM

## 2023-09-22 DIAGNOSIS — G47.33 OBSTRUCTIVE SLEEP APNEA (ADULT) (PEDIATRIC): ICD-10-CM

## 2023-09-22 DIAGNOSIS — Z98.890 OTHER SPECIFIED POSTPROCEDURAL STATES: Chronic | ICD-10-CM

## 2023-09-22 PROCEDURE — 95800 SLP STDY UNATTENDED: CPT

## 2023-09-22 PROCEDURE — 95800 SLP STDY UNATTENDED: CPT | Mod: 26

## 2023-10-04 ENCOUNTER — APPOINTMENT (OUTPATIENT)
Dept: BARIATRICS | Facility: CLINIC | Age: 38
End: 2023-10-04
Payer: MEDICAID

## 2023-10-04 VITALS — HEIGHT: 62.5 IN | BODY MASS INDEX: 47.91 KG/M2 | WEIGHT: 267 LBS

## 2023-10-04 PROCEDURE — ZZZZZ: CPT

## 2023-10-05 ENCOUNTER — APPOINTMENT (OUTPATIENT)
Dept: CARDIOLOGY | Facility: CLINIC | Age: 38
End: 2023-10-05
Payer: MEDICAID

## 2023-10-05 VITALS
DIASTOLIC BLOOD PRESSURE: 83 MMHG | TEMPERATURE: 98.2 F | HEART RATE: 76 BPM | WEIGHT: 270 LBS | BODY MASS INDEX: 49.69 KG/M2 | OXYGEN SATURATION: 99 % | HEIGHT: 62 IN | SYSTOLIC BLOOD PRESSURE: 126 MMHG

## 2023-10-05 VITALS — DIASTOLIC BLOOD PRESSURE: 86 MMHG | SYSTOLIC BLOOD PRESSURE: 128 MMHG

## 2023-10-05 PROCEDURE — 93000 ELECTROCARDIOGRAM COMPLETE: CPT

## 2023-10-05 PROCEDURE — 99205 OFFICE O/P NEW HI 60 MIN: CPT | Mod: 25

## 2023-10-13 ENCOUNTER — APPOINTMENT (OUTPATIENT)
Dept: UROLOGY | Facility: CLINIC | Age: 38
End: 2023-10-13

## 2023-10-16 ENCOUNTER — APPOINTMENT (OUTPATIENT)
Dept: GASTROENTEROLOGY | Facility: CLINIC | Age: 38
End: 2023-10-16
Payer: MEDICAID

## 2023-10-16 VITALS
BODY MASS INDEX: 49.32 KG/M2 | OXYGEN SATURATION: 98 % | DIASTOLIC BLOOD PRESSURE: 96 MMHG | RESPIRATION RATE: 16 BRPM | HEIGHT: 62 IN | WEIGHT: 268 LBS | SYSTOLIC BLOOD PRESSURE: 139 MMHG | HEART RATE: 65 BPM

## 2023-10-16 PROCEDURE — 99203 OFFICE O/P NEW LOW 30 MIN: CPT

## 2023-10-16 RX ORDER — AMOXICILLIN AND CLAVULANATE POTASSIUM 875; 125 MG/1; MG/1
875-125 TABLET, COATED ORAL
Qty: 10 | Refills: 0 | Status: DISCONTINUED | COMMUNITY
Start: 2023-07-19 | End: 2023-10-16

## 2023-10-16 RX ORDER — TOPIRAMATE 100 MG/1
100 TABLET, FILM COATED ORAL
Qty: 90 | Refills: 6 | Status: DISCONTINUED | COMMUNITY
Start: 2020-08-31 | End: 2023-10-16

## 2023-10-16 RX ORDER — LEVETIRACETAM 1000 MG/1
TABLET, FILM COATED ORAL
Refills: 0 | Status: DISCONTINUED | COMMUNITY
End: 2023-10-16

## 2023-10-16 RX ORDER — CEFPODOXIME PROXETIL 200 MG/1
200 TABLET, FILM COATED ORAL
Qty: 14 | Refills: 0 | Status: DISCONTINUED | COMMUNITY
Start: 2023-05-11 | End: 2023-10-16

## 2023-10-16 RX ORDER — TOPIRAMATE 50 MG/1
50 TABLET, FILM COATED ORAL TWICE DAILY
Qty: 60 | Refills: 2 | Status: DISCONTINUED | COMMUNITY
Start: 2020-01-15 | End: 2023-10-16

## 2023-10-16 RX ORDER — TOPIRAMATE 100 MG/1
100 TABLET, FILM COATED ORAL
Qty: 60 | Refills: 5 | Status: DISCONTINUED | COMMUNITY
Start: 2020-02-19 | End: 2023-10-16

## 2023-10-18 ENCOUNTER — APPOINTMENT (OUTPATIENT)
Dept: NEUROLOGY | Facility: CLINIC | Age: 38
End: 2023-10-18
Payer: MEDICAID

## 2023-10-18 VITALS
HEIGHT: 62 IN | SYSTOLIC BLOOD PRESSURE: 150 MMHG | WEIGHT: 268 LBS | BODY MASS INDEX: 49.32 KG/M2 | DIASTOLIC BLOOD PRESSURE: 110 MMHG

## 2023-10-18 DIAGNOSIS — R56.9 UNSPECIFIED CONVULSIONS: ICD-10-CM

## 2023-10-18 PROCEDURE — 99215 OFFICE O/P EST HI 40 MIN: CPT

## 2023-10-20 ENCOUNTER — APPOINTMENT (OUTPATIENT)
Dept: NEUROLOGY | Facility: CLINIC | Age: 38
End: 2023-10-20
Payer: MEDICAID

## 2023-10-20 PROCEDURE — 95819 EEG AWAKE AND ASLEEP: CPT

## 2023-10-20 PROCEDURE — 93040 RHYTHM ECG WITH REPORT: CPT

## 2023-10-21 PROCEDURE — 95700 EEG CONT REC W/VID EEG TECH: CPT

## 2023-10-21 PROCEDURE — 95708 EEG WO VID EA 12-26HR UNMNTR: CPT

## 2023-10-21 PROCEDURE — 95717 EEG PHYS/QHP 2-12 HR W/O VID: CPT

## 2023-10-21 PROCEDURE — 95719 EEG PHYS/QHP EA INCR W/O VID: CPT

## 2023-10-25 ENCOUNTER — APPOINTMENT (OUTPATIENT)
Dept: UROLOGY | Facility: CLINIC | Age: 38
End: 2023-10-25
Payer: MEDICAID

## 2023-10-25 PROCEDURE — 99215 OFFICE O/P EST HI 40 MIN: CPT | Mod: 24

## 2023-11-06 ENCOUNTER — APPOINTMENT (OUTPATIENT)
Dept: BARIATRICS | Facility: CLINIC | Age: 38
End: 2023-11-06
Payer: MEDICAID

## 2023-11-06 VITALS — HEIGHT: 62 IN | BODY MASS INDEX: 49.32 KG/M2 | WEIGHT: 268 LBS

## 2023-11-06 PROCEDURE — 97803 MED NUTRITION INDIV SUBSEQ: CPT

## 2023-11-08 ENCOUNTER — APPOINTMENT (OUTPATIENT)
Dept: UROLOGY | Facility: CLINIC | Age: 38
End: 2023-11-08

## 2023-11-08 VITALS
DIASTOLIC BLOOD PRESSURE: 85 MMHG | RESPIRATION RATE: 16 BRPM | HEART RATE: 80 BPM | SYSTOLIC BLOOD PRESSURE: 128 MMHG | OXYGEN SATURATION: 97 %

## 2023-11-11 ENCOUNTER — OUTPATIENT (OUTPATIENT)
Dept: OUTPATIENT SERVICES | Facility: HOSPITAL | Age: 38
LOS: 1 days | End: 2023-11-11
Payer: MEDICAID

## 2023-11-11 ENCOUNTER — APPOINTMENT (OUTPATIENT)
Dept: CT IMAGING | Facility: CLINIC | Age: 38
End: 2023-11-11

## 2023-11-11 DIAGNOSIS — Z90.49 ACQUIRED ABSENCE OF OTHER SPECIFIED PARTS OF DIGESTIVE TRACT: Chronic | ICD-10-CM

## 2023-11-11 DIAGNOSIS — Z98.890 OTHER SPECIFIED POSTPROCEDURAL STATES: Chronic | ICD-10-CM

## 2023-11-11 DIAGNOSIS — N20.0 CALCULUS OF KIDNEY: ICD-10-CM

## 2023-11-11 PROCEDURE — 74176 CT ABD & PELVIS W/O CONTRAST: CPT | Mod: 26

## 2023-11-15 ENCOUNTER — RESULT REVIEW (OUTPATIENT)
Age: 38
End: 2023-11-15

## 2023-11-15 ENCOUNTER — APPOINTMENT (OUTPATIENT)
Dept: UROLOGY | Facility: CLINIC | Age: 38
End: 2023-11-15
Payer: MEDICAID

## 2023-11-15 PROCEDURE — 99214 OFFICE O/P EST MOD 30 MIN: CPT

## 2023-11-20 RX ORDER — IBUPROFEN 200 MG
0 TABLET ORAL
Refills: 0 | DISCHARGE

## 2023-11-29 ENCOUNTER — APPOINTMENT (OUTPATIENT)
Dept: CARDIOLOGY | Facility: CLINIC | Age: 38
End: 2023-11-29
Payer: MEDICAID

## 2023-11-29 PROCEDURE — 93015 CV STRESS TEST SUPVJ I&R: CPT

## 2023-11-29 PROCEDURE — 93306 TTE W/DOPPLER COMPLETE: CPT

## 2023-12-04 ENCOUNTER — APPOINTMENT (OUTPATIENT)
Dept: BARIATRICS | Facility: CLINIC | Age: 38
End: 2023-12-04

## 2023-12-08 PROBLEM — E66.01 MORBID (SEVERE) OBESITY DUE TO EXCESS CALORIES: Chronic | Status: ACTIVE | Noted: 2023-07-12

## 2023-12-08 PROBLEM — E11.9 TYPE 2 DIABETES MELLITUS WITHOUT COMPLICATIONS: Chronic | Status: ACTIVE | Noted: 2023-07-12

## 2023-12-08 PROBLEM — Z87.898 PERSONAL HISTORY OF OTHER SPECIFIED CONDITIONS: Chronic | Status: ACTIVE | Noted: 2023-07-12

## 2023-12-08 PROBLEM — E03.9 HYPOTHYROIDISM, UNSPECIFIED: Chronic | Status: ACTIVE | Noted: 2023-07-12

## 2023-12-08 PROBLEM — N20.0 CALCULUS OF KIDNEY: Chronic | Status: ACTIVE | Noted: 2023-07-12

## 2024-01-17 ENCOUNTER — OUTPATIENT (OUTPATIENT)
Dept: OUTPATIENT SERVICES | Facility: HOSPITAL | Age: 39
LOS: 1 days | End: 2024-01-17
Payer: COMMERCIAL

## 2024-01-17 ENCOUNTER — TRANSCRIPTION ENCOUNTER (OUTPATIENT)
Age: 39
End: 2024-01-17

## 2024-01-17 ENCOUNTER — APPOINTMENT (OUTPATIENT)
Dept: GASTROENTEROLOGY | Facility: HOSPITAL | Age: 39
End: 2024-01-17

## 2024-01-17 ENCOUNTER — RESULT REVIEW (OUTPATIENT)
Age: 39
End: 2024-01-17

## 2024-01-17 DIAGNOSIS — E66.01 MORBID (SEVERE) OBESITY DUE TO EXCESS CALORIES: ICD-10-CM

## 2024-01-17 DIAGNOSIS — Z90.49 ACQUIRED ABSENCE OF OTHER SPECIFIED PARTS OF DIGESTIVE TRACT: Chronic | ICD-10-CM

## 2024-01-17 DIAGNOSIS — Z98.890 OTHER SPECIFIED POSTPROCEDURAL STATES: Chronic | ICD-10-CM

## 2024-01-17 PROCEDURE — 88305 TISSUE EXAM BY PATHOLOGIST: CPT

## 2024-01-17 PROCEDURE — 43239 EGD BIOPSY SINGLE/MULTIPLE: CPT

## 2024-01-17 PROCEDURE — 88342 IMHCHEM/IMCYTCHM 1ST ANTB: CPT | Mod: 26

## 2024-01-17 PROCEDURE — 88341 IMHCHEM/IMCYTCHM EA ADD ANTB: CPT

## 2024-01-17 PROCEDURE — 88342 IMHCHEM/IMCYTCHM 1ST ANTB: CPT

## 2024-01-17 PROCEDURE — 88305 TISSUE EXAM BY PATHOLOGIST: CPT | Mod: 26

## 2024-01-17 PROCEDURE — 88341 IMHCHEM/IMCYTCHM EA ADD ANTB: CPT | Mod: 26

## 2024-01-19 NOTE — ASSESSMENT
[FreeTextEntry1] : 38-year-old lady history of obesity, hypothyroidism on , diabetes, nephrolithiasis, seizures, surgical history significant for appendectomy, ovarian cystectomy, here for EGD prebariatric surgery. Neuro clearance in chart.   The patient is medically optimized for procedure(s). All questions have been answered. The risks and benefits of the procedure have been discussed and the patient signed consent for the procedure. Preliminary results will be discussed when the patient wakes up from anesthesia, but definitive results will be discussed after the pathology report is issued in 5 business days.

## 2024-01-19 NOTE — HISTORY OF PRESENT ILLNESS
[FreeTextEntry1] : 38-year-old lady history of obesity, hypothyroidism on , diabetes, nephrolithiasis, seizures, surgical history significant for appendectomy, ovarian cystectomy, here for EGD prebariatric surgery.

## 2024-01-24 LAB — SURGICAL PATHOLOGY STUDY: SIGNIFICANT CHANGE UP

## 2024-01-29 ENCOUNTER — NON-APPOINTMENT (OUTPATIENT)
Age: 39
End: 2024-01-29

## 2024-02-22 DIAGNOSIS — E55.9 VITAMIN D DEFICIENCY, UNSPECIFIED: ICD-10-CM

## 2024-02-22 LAB
25(OH)D3 SERPL-MCNC: 19 NG/ML
ALBUMIN SERPL ELPH-MCNC: 4.3 G/DL
ALP BLD-CCNC: 83 U/L
ALT SERPL-CCNC: 59 U/L
ANION GAP SERPL CALC-SCNC: 18 MMOL/L
APTT BLD: 34.3 SEC
AST SERPL-CCNC: 29 U/L
BASOPHILS # BLD AUTO: 0.04 K/UL
BASOPHILS NFR BLD AUTO: 0.5 %
BILIRUB DIRECT SERPL-MCNC: 0.1 MG/DL
BILIRUB INDIRECT SERPL-MCNC: 0.3 MG/DL
BILIRUB SERPL-MCNC: 0.4 MG/DL
BUN SERPL-MCNC: 11 MG/DL
CA-I SERPL-SCNC: 4.9 MG/DL
CALCIUM SERPL-MCNC: 9.2 MG/DL
CALCIUM SERPL-MCNC: 9.2 MG/DL
CHLORIDE SERPL-SCNC: 102 MMOL/L
CHOLEST SERPL-MCNC: 183 MG/DL
CO2 SERPL-SCNC: 20 MMOL/L
CREAT SERPL-MCNC: 0.93 MG/DL
EGFR: 80 ML/MIN/1.73M2
EOSINOPHIL # BLD AUTO: 0.26 K/UL
EOSINOPHIL NFR BLD AUTO: 3.2 %
ESTIMATED AVERAGE GLUCOSE: 120 MG/DL
FERRITIN SERPL-MCNC: 84 NG/ML
FOLATE SERPL-MCNC: 8.9 NG/ML
GLUCOSE SERPL-MCNC: 93 MG/DL
HBA1C MFR BLD HPLC: 5.8 %
HCG SERPL QL: NEGATIVE
HCT VFR BLD CALC: 45.1 %
HDLC SERPL-MCNC: 39 MG/DL
HGB BLD-MCNC: 14.7 G/DL
IMM GRANULOCYTES NFR BLD AUTO: 0.5 %
INR PPP: 0.87 RATIO
IRON SATN MFR SERPL: 24 %
IRON SERPL-MCNC: 91 UG/DL
LDLC SERPL CALC-MCNC: 116 MG/DL
LYMPHOCYTES # BLD AUTO: 2.6 K/UL
LYMPHOCYTES NFR BLD AUTO: 31.6 %
MAGNESIUM SERPL-MCNC: 2.1 MG/DL
MAN DIFF?: NORMAL
MCHC RBC-ENTMCNC: 30.8 PG
MCHC RBC-ENTMCNC: 32.6 GM/DL
MCV RBC AUTO: 94.4 FL
MONOCYTES # BLD AUTO: 0.57 K/UL
MONOCYTES NFR BLD AUTO: 6.9 %
NEUTROPHILS # BLD AUTO: 4.72 K/UL
NEUTROPHILS NFR BLD AUTO: 57.3 %
NONHDLC SERPL-MCNC: 145 MG/DL
PAPP-A SERPL-ACNC: <1 MIU/ML
PARATHYROID HORMONE INTACT: 42 PG/ML
PHOSPHATE SERPL-MCNC: 3.8 MG/DL
PLATELET # BLD AUTO: 322 K/UL
POTASSIUM SERPL-SCNC: 4 MMOL/L
PREALB SERPL NEPH-MCNC: 22 MG/DL
PROT SERPL-MCNC: 7.2 G/DL
PT BLD: 9.9 SEC
RBC # BLD: 4.78 M/UL
RBC # FLD: 14.2 %
SODIUM SERPL-SCNC: 140 MMOL/L
T4 SERPL-MCNC: 8 UG/DL
TIBC SERPL-MCNC: 387 UG/DL
TRIGL SERPL-MCNC: 162 MG/DL
TSH SERPL-ACNC: 4.23 UIU/ML
UIBC SERPL-MCNC: 296 UG/DL
VIT B12 SERPL-MCNC: 997 PG/ML
WBC # FLD AUTO: 8.23 K/UL

## 2024-02-22 RX ORDER — ERGOCALCIFEROL 1.25 MG/1
50000 CAPSULE ORAL
Qty: 12 | Refills: 0 | Status: ACTIVE | COMMUNITY
Start: 2024-02-22 | End: 1900-01-01

## 2024-02-23 ENCOUNTER — APPOINTMENT (OUTPATIENT)
Dept: BARIATRICS | Facility: CLINIC | Age: 39
End: 2024-02-23
Payer: COMMERCIAL

## 2024-02-23 ENCOUNTER — APPOINTMENT (OUTPATIENT)
Dept: ULTRASOUND IMAGING | Facility: CLINIC | Age: 39
End: 2024-02-23
Payer: COMMERCIAL

## 2024-02-23 ENCOUNTER — OUTPATIENT (OUTPATIENT)
Dept: OUTPATIENT SERVICES | Facility: HOSPITAL | Age: 39
LOS: 1 days | End: 2024-02-23

## 2024-02-23 VITALS
SYSTOLIC BLOOD PRESSURE: 152 MMHG | WEIGHT: 279.6 LBS | TEMPERATURE: 98 F | OXYGEN SATURATION: 98 % | HEIGHT: 62 IN | BODY MASS INDEX: 51.45 KG/M2 | HEART RATE: 91 BPM | DIASTOLIC BLOOD PRESSURE: 110 MMHG | RESPIRATION RATE: 16 BRPM

## 2024-02-23 DIAGNOSIS — Z90.49 ACQUIRED ABSENCE OF OTHER SPECIFIED PARTS OF DIGESTIVE TRACT: Chronic | ICD-10-CM

## 2024-02-23 DIAGNOSIS — E66.01 MORBID (SEVERE) OBESITY DUE TO EXCESS CALORIES: ICD-10-CM

## 2024-02-23 PROCEDURE — 76857 US EXAM PELVIC LIMITED: CPT | Mod: 26

## 2024-02-23 PROCEDURE — 76700 US EXAM ABDOM COMPLETE: CPT | Mod: 26

## 2024-02-23 PROCEDURE — 97803 MED NUTRITION INDIV SUBSEQ: CPT

## 2024-02-26 LAB
VIT B2 SERPL-MCNC: 283 UG/L
VIT C SERPL-MCNC: 0.4 MG/DL

## 2024-02-27 LAB
A-TOCOPHEROL VIT E SERPL-MCNC: 6.3 MG/L
BETA+GAMMA TOCOPHEROL SERPL-MCNC: 0.9 MG/L
VIT A SERPL-MCNC: 39.3 UG/DL
VIT B1 SERPL-MCNC: 102.7 NMOL/L
VIT B6 SERPL-MCNC: 6.2 UG/L

## 2024-02-28 LAB
COPPER SERPL-MCNC: 126 UG/DL
ZINC SERPL-MCNC: 77 UG/DL

## 2024-02-29 LAB — MENADIONE SERPL-MCNC: 0.52 NG/ML

## 2024-03-06 ENCOUNTER — APPOINTMENT (OUTPATIENT)
Dept: UROLOGY | Facility: CLINIC | Age: 39
End: 2024-03-06
Payer: COMMERCIAL

## 2024-03-06 PROCEDURE — 99214 OFFICE O/P EST MOD 30 MIN: CPT

## 2024-03-13 NOTE — HISTORY OF PRESENT ILLNESS
[FreeTextEntry1] : 37 yo F interview performed with [place] interpretation   s/p left PCNL followed by ureteroscopy, no stent s/p right PCNL with stent stone: 80% Struvite (Magnesium Ammonium Phosphate), 20% Calcium Phosphate (Apatite)  cystoscopy and stent removal  8/18/2023 previous CT showing only suspected punctate stones in the kidneys around 1 mm, not sure if in the system previous 24 hr urine: very high calcium, high oxalate, high Na, high UUN K-citrate, chlorthalidone, alluporinol, dietary changes   now feeling well reviewed CBC: 2/21/2024: HGB 14, WBC 7.75 reviewed BMP 2/21/2024: CRE 0.9. K 4.0 , NA normal no new 24 hr reviewed ultrasound 2/23/2024: no renal stones, gallbladder sludge (see full report)  plan: - follow up in 3 months with new 24 hr urine and ultrasound

## 2024-03-13 NOTE — LETTER BODY
[Dear  ___] : Dear  [unfilled], [Consult Letter:] : I had the pleasure of evaluating your patient, [unfilled]. [Please see my note below.] : Please see my note below. [Consult Closing:] : Thank you very much for allowing me to participate in the care of this patient.  If you have any questions, please do not hesitate to contact me. [Sincerely,] : Sincerely, [FreeTextEntry1] : mutual patient under our care doing well wanted to make you aware of the recent ultrasound result wit findings in the gallbladder feel free to contact me with any questions [FreeTextEntry3] : Jesu Perkins MD Director, Endourology and Stone Disease   [facility] [employer] [facility]

## 2024-03-15 ENCOUNTER — APPOINTMENT (OUTPATIENT)
Dept: BARIATRICS | Facility: CLINIC | Age: 39
End: 2024-03-15
Payer: COMMERCIAL

## 2024-03-15 VITALS
TEMPERATURE: 97.9 F | OXYGEN SATURATION: 98 % | HEART RATE: 96 BPM | BODY MASS INDEX: 52.17 KG/M2 | SYSTOLIC BLOOD PRESSURE: 126 MMHG | WEIGHT: 283.5 LBS | HEIGHT: 62 IN | RESPIRATION RATE: 16 BRPM | DIASTOLIC BLOOD PRESSURE: 85 MMHG

## 2024-03-15 PROCEDURE — 97803 MED NUTRITION INDIV SUBSEQ: CPT

## 2024-03-21 ENCOUNTER — APPOINTMENT (OUTPATIENT)
Dept: CARDIOLOGY | Facility: CLINIC | Age: 39
End: 2024-03-21
Payer: COMMERCIAL

## 2024-03-21 VITALS — DIASTOLIC BLOOD PRESSURE: 84 MMHG | HEART RATE: 84 BPM | SYSTOLIC BLOOD PRESSURE: 133 MMHG | OXYGEN SATURATION: 97 %

## 2024-03-21 VITALS — WEIGHT: 284 LBS | HEIGHT: 62 IN | BODY MASS INDEX: 52.26 KG/M2

## 2024-03-21 DIAGNOSIS — E11.9 TYPE 2 DIABETES MELLITUS W/OUT COMPLICATIONS: ICD-10-CM

## 2024-03-21 PROCEDURE — 99214 OFFICE O/P EST MOD 30 MIN: CPT

## 2024-03-21 PROCEDURE — G2211 COMPLEX E/M VISIT ADD ON: CPT

## 2024-03-21 RX ORDER — HYDROCHLOROTHIAZIDE 25 MG/1
25 TABLET ORAL
Qty: 90 | Refills: 3 | Status: DISCONTINUED | COMMUNITY
Start: 2023-10-25 | End: 2024-03-21

## 2024-03-21 NOTE — PHYSICAL EXAM
[Well Developed] : well developed [Well Nourished] : well nourished [No Acute Distress] : no acute distress [Obese] : obese [Normal Conjunctiva] : normal conjunctiva [Normal Venous Pressure] : normal venous pressure [No Carotid Bruit] : no carotid bruit [Normal S1, S2] : normal S1, S2 [No Murmur] : no murmur [No Rub] : no rub [Clear Lung Fields] : clear lung fields [No Gallop] : no gallop [Good Air Entry] : good air entry [No Respiratory Distress] : no respiratory distress  [Soft] : abdomen soft [Non Tender] : non-tender [No Masses/organomegaly] : no masses/organomegaly [Normal Bowel Sounds] : normal bowel sounds [Normal Gait] : normal gait [No Cyanosis] : no cyanosis [No Edema] : no edema [No Clubbing] : no clubbing [No Varicosities] : no varicosities [No Rash] : no rash [No Skin Lesions] : no skin lesions [Moves all extremities] : moves all extremities [No Focal Deficits] : no focal deficits [Alert and Oriented] : alert and oriented [Normal Speech] : normal speech [Normal memory] : normal memory

## 2024-03-21 NOTE — DISCUSSION/SUMMARY
[FreeTextEntry1] : Ms. ANA LATHAM is a very pleasant 38 year woman with a past medical history of obesity, diabetes, hypothyroidism presenting for evaluation prior to bariatric surgery.   #Preoperative Assessment: She is a moderate risk patient (morbid obesity) undergoing a moderate risk surgery (intra-abdominal bariatric surgery). She performed well on her stress test and echo showed no abnormalities. She can proceed with surgery without further need for cardiac testing beforehand. proceed.  - F/U as needed or if she develops cardiac symptoms.   #obesity: lifestyle changes in addition to bariatric surgery. Patient counseled on diet and lifestyle. Already seeing nutritionist prior to surgery.  #Diabetes: weight loss and diet control per PCP. Not on insulin.    Patient was advised to partake in 150 minutes of moderate exercise per week. Patient was advised to see us as needed if stable and certainly earlier with any new symptoms. Patient was advised to contact the office or seek medical care for any new or concerning symptoms right away.  Patient verbalized understanding and is in agreement with the above plan.

## 2024-03-21 NOTE — HISTORY OF PRESENT ILLNESS
[FreeTextEntry1] : Ms. ANA LATHAM is a very pleasant 38 year woman with a past medical history of obesity, hypothyroidism presenting for evaluation prior to bariatric surgery. She feels well and has had no cardiac issues. Recently diagnosied with diabetes, not on insulin. Plans for anai en y with Dr. Lynne. No chest pain at rest. Some shortness of breath after walking 20 minutes. Can ascend staircase slowly without problems but does get short of breath.   ECG today NSR without ischemic changes.   BP well controlled.   Otherwise, denies orthopnea, paroxysmal nocturnal dyspnea, lower extremity edema, unexplained weight gain or dyspnea on exertion.  3/21/2024 Presents for follow up. Feels well. No complaints. No chest pain or shortness of breath.  Stress test was normal, achieved adequate heart rate. Echo without structural abnormalities.  Otherwise, denies orthopnea, paroxysmal nocturnal dyspnea, lower extremity edema, unexplained weight gain or dyspnea on exertion.

## 2024-04-03 ENCOUNTER — APPOINTMENT (OUTPATIENT)
Dept: SURGERY | Facility: CLINIC | Age: 39
End: 2024-04-03
Payer: COMMERCIAL

## 2024-04-03 VITALS
RESPIRATION RATE: 16 BRPM | SYSTOLIC BLOOD PRESSURE: 137 MMHG | TEMPERATURE: 98.2 F | HEART RATE: 86 BPM | WEIGHT: 284.38 LBS | HEIGHT: 62 IN | OXYGEN SATURATION: 97 % | DIASTOLIC BLOOD PRESSURE: 85 MMHG | BODY MASS INDEX: 52.33 KG/M2

## 2024-04-03 PROCEDURE — 99214 OFFICE O/P EST MOD 30 MIN: CPT

## 2024-04-03 NOTE — REVIEW OF SYSTEMS
[Patient Intake Form Reviewed] : Patient intake form was reviewed [Fever] : no fever [Chills] : no chills [Fatigue] : fatigue [Dysphagia] : no dysphagia [Recent Change In Weight] : ~T recent weight change [Odynophagia] : no odynophagia [Chest Pain] : chest pain [Palpitations] : palpitations [Lower Ext Edema] : lower extremity edema [Leg Claudication] : no intermittent leg claudication [Shortness Of Breath] : no shortness of breath [Cough] : no cough [Wheezing] : no wheezing [SOB on Exertion] : shortness of breath during exertion [Abdominal Pain] : no abdominal pain [Reflux/Heartburn] : no reflux/ heartburn [Joint Pain] : joint pain [Joint Stiffness] : joint stiffness [Skin Rash] : skin rash [Easy Bruising] : no tendency for easy bruising [Easy Bleeding] : no tendency for easy bleeding [Negative] : Heme/Lymph [FreeTextEntry8] : H/O nephrolithiasis [FreeTextEntry5] : Severely Limited exercise tolerance [de-identified] : Frequent irritation and redness under abdominal pannus

## 2024-04-03 NOTE — ASSESSMENT
[FreeTextEntry1] : Patient with BMI of 51which places patient in the morbidly obese category. This has directly contributed to patient's current medical conditions as well as, a decreased quality of life. Patient has very little chance of successfully losing and maintaining a significant amount of weight with non-surgical management, and would benefit from surgical intervention. Patient meets the criteria for weight loss surgery as defined in the NIH consensus statement, surgery is medically necessary.  Given patients current BMI and obesity-related comorbidities, I feel the patient remains a candidate for and would benefit from weight loss surgery, as all prior attempts by non-surgical means have been futile.  Patient has put a great deal of thought and consideration into weight loss surgery, and remains  committed to proceeding with weight loss surgery.

## 2024-04-03 NOTE — HISTORY OF PRESENT ILLNESS
[de-identified] : Patient is here for review and discussion of informed consent in preparation for planned sleeve gastrectomy.  Patient has attended our weight loss seminar, and has completed all of the required pre-operative testing and evaluations, and is ready to proceed with a sleeve gastrectomy.   Patient is aware of the pre-op liquid diet for 2 weeks and has attending the pre-op class and has completed the pre-testing and follow with a visit to PCP for medical clearance.   Patient has a long-standing history of severe obesity refractory to multiple prior attempts at weight loss including conservative treatments of diet and exercise. In addition, patient suffers from obesity-related co-morbidities.    Patient reports no interval changes to overall health status or medical history, and has no complaints at this time.   [de-identified] : Patient reports no interval changes to medications, medical history or overall health status.

## 2024-04-03 NOTE — PLAN
[FreeTextEntry1] : I have had a lengthy and detailed conversation with the patient and have discussed my impressions and treatment plans with the patient.  Informed consent and potential risks , benefits  and alternatives to the planned surgery were discussed in depth, including but not limited to:  bleeding, infection; seroma, hematoma, abscess; intestinal, gastric, vascular or other visceral or solid organ injury; leak;  injury to other adjacent or surrounding structures or organs; new or worsening reflux/heartburn; inability to complete the intended procedure or need to abort the procedure; inadequate weight loss or weight regain;  port site or incisional hernia; need for reoperation or other procedure; perioperative myocardial infarction, stroke, deep vein thrombosis, pulmonary embolus, pneumonia and death.  All surgical options were discussed including non-surgical treatments. The patient remains interested in a sleeve gastrectomy for weight loss.   The weight loss surgery education packet as well as the nutrition education packet have been reviewed and given to the patient previously, and I also provided patient with detailed information regarding the post-operative instructions and expectations.   Also discussed was importance of taking supplements and attending regular follow-up. I reviewed importance of behavioral modification and follow-up in order to optimize outcomes and avoid complications.  The patient is aware of the expected length of stay and discharge plan for the sleeve gastrectomy procedure. I encouraged the patient to maintain a diet and exercise program to promote optimum health for the surgical procedure and post-op course.  I informed patient that all testing and evaluations and documentation have been received and reviewed.  This information to justify medical necessity for weight loss surgery will be submitted to insurance for pre-certification.   We will plan to proceed with surgery at the next available date, pending any required insurance pre-certification or pre-approval.   Patient agrees to obtain any outstanding necessary pre-operative evaluations and clearances that may be required for pre-surgical optimization.    The patient had the opportunity to ask pertinent questions, and all of patients questions and concerns were addressed to patients satisfaction.  Patient verbalized an understanding of the information discussed, and wishes to proceed with surgery. Informed consent for sleeve gastrectomy reviewed with and signed by patient.

## 2024-04-03 NOTE — PHYSICAL EXAM
[Obese, well nourished, in no acute distress] : obese, well nourished, in no acute distress [Normal] : affect appropriate [de-identified] : Obese, protuberant. Soft, nontender, nondistended, no rebound or guarding.  Well-healed Pfannenstiel incision

## 2024-04-09 LAB
ANION GAP SERPL CALC-SCNC: 18 MMOL/L
BASOPHILS # BLD AUTO: 0.05 K/UL
BASOPHILS NFR BLD AUTO: 0.6 %
BUN SERPL-MCNC: 11 MG/DL
CALCIUM SERPL-MCNC: 9.1 MG/DL
CHLORIDE SERPL-SCNC: 102 MMOL/L
CO2 SERPL-SCNC: 19 MMOL/L
CREAT SERPL-MCNC: 0.94 MG/DL
EGFR: 79 ML/MIN/1.73M2
EOSINOPHIL # BLD AUTO: 0.28 K/UL
EOSINOPHIL NFR BLD AUTO: 3.6 %
GLUCOSE SERPL-MCNC: 94 MG/DL
HCT VFR BLD CALC: 45.1 %
HGB BLD-MCNC: 14.6 G/DL
IMM GRANULOCYTES NFR BLD AUTO: 0.8 %
LYMPHOCYTES # BLD AUTO: 2.27 K/UL
LYMPHOCYTES NFR BLD AUTO: 29.3 %
MAN DIFF?: NORMAL
MCHC RBC-ENTMCNC: 30.7 PG
MCHC RBC-ENTMCNC: 32.4 GM/DL
MCV RBC AUTO: 94.7 FL
MONOCYTES # BLD AUTO: 0.51 K/UL
MONOCYTES NFR BLD AUTO: 6.6 %
NEUTROPHILS # BLD AUTO: 4.58 K/UL
NEUTROPHILS NFR BLD AUTO: 59.1 %
PLATELET # BLD AUTO: 329 K/UL
POTASSIUM SERPL-SCNC: 4 MMOL/L
RBC # BLD: 4.76 M/UL
RBC # FLD: 14.2 %
SODIUM SERPL-SCNC: 140 MMOL/L
WBC # FLD AUTO: 7.75 K/UL

## 2024-04-24 ENCOUNTER — OUTPATIENT (OUTPATIENT)
Dept: OUTPATIENT SERVICES | Facility: HOSPITAL | Age: 39
LOS: 1 days | End: 2024-04-24
Payer: COMMERCIAL

## 2024-04-24 VITALS
HEIGHT: 64 IN | OXYGEN SATURATION: 98 % | TEMPERATURE: 97 F | WEIGHT: 288.14 LBS | HEART RATE: 77 BPM | RESPIRATION RATE: 20 BRPM | SYSTOLIC BLOOD PRESSURE: 120 MMHG | DIASTOLIC BLOOD PRESSURE: 70 MMHG

## 2024-04-24 DIAGNOSIS — Z01.818 ENCOUNTER FOR OTHER PREPROCEDURAL EXAMINATION: ICD-10-CM

## 2024-04-24 DIAGNOSIS — Z98.890 OTHER SPECIFIED POSTPROCEDURAL STATES: Chronic | ICD-10-CM

## 2024-04-24 DIAGNOSIS — E66.01 MORBID (SEVERE) OBESITY DUE TO EXCESS CALORIES: ICD-10-CM

## 2024-04-24 DIAGNOSIS — N20.0 CALCULUS OF KIDNEY: Chronic | ICD-10-CM

## 2024-04-24 DIAGNOSIS — Z90.49 ACQUIRED ABSENCE OF OTHER SPECIFIED PARTS OF DIGESTIVE TRACT: Chronic | ICD-10-CM

## 2024-04-24 DIAGNOSIS — Z29.9 ENCOUNTER FOR PROPHYLACTIC MEASURES, UNSPECIFIED: ICD-10-CM

## 2024-04-24 LAB
A1C WITH ESTIMATED AVERAGE GLUCOSE RESULT: 6.4 % — HIGH (ref 4–5.6)
ALBUMIN SERPL ELPH-MCNC: 4.4 G/DL — SIGNIFICANT CHANGE UP (ref 3.3–5.2)
ALP SERPL-CCNC: 83 U/L — SIGNIFICANT CHANGE UP (ref 40–120)
ALT FLD-CCNC: 116 U/L — HIGH
ANION GAP SERPL CALC-SCNC: 11 MMOL/L — SIGNIFICANT CHANGE UP (ref 5–17)
APTT BLD: 33.4 SEC — SIGNIFICANT CHANGE UP (ref 24.5–35.6)
AST SERPL-CCNC: 66 U/L — HIGH
BASOPHILS # BLD AUTO: 0.06 K/UL — SIGNIFICANT CHANGE UP (ref 0–0.2)
BASOPHILS NFR BLD AUTO: 0.6 % — SIGNIFICANT CHANGE UP (ref 0–2)
BILIRUB SERPL-MCNC: 0.4 MG/DL — SIGNIFICANT CHANGE UP (ref 0.4–2)
BLD GP AB SCN SERPL QL: SIGNIFICANT CHANGE UP
BUN SERPL-MCNC: 11.3 MG/DL — SIGNIFICANT CHANGE UP (ref 8–20)
CALCIUM SERPL-MCNC: 9.3 MG/DL — SIGNIFICANT CHANGE UP (ref 8.4–10.5)
CHLORIDE SERPL-SCNC: 102 MMOL/L — SIGNIFICANT CHANGE UP (ref 96–108)
CO2 SERPL-SCNC: 25 MMOL/L — SIGNIFICANT CHANGE UP (ref 22–29)
CREAT SERPL-MCNC: 0.6 MG/DL — SIGNIFICANT CHANGE UP (ref 0.5–1.3)
EGFR: 116 ML/MIN/1.73M2 — SIGNIFICANT CHANGE UP
EOSINOPHIL # BLD AUTO: 0.24 K/UL — SIGNIFICANT CHANGE UP (ref 0–0.5)
EOSINOPHIL NFR BLD AUTO: 2.4 % — SIGNIFICANT CHANGE UP (ref 0–6)
ESTIMATED AVERAGE GLUCOSE: 137 MG/DL — HIGH (ref 68–114)
GLUCOSE SERPL-MCNC: 108 MG/DL — HIGH (ref 70–99)
HCT VFR BLD CALC: 43.8 % — SIGNIFICANT CHANGE UP (ref 34.5–45)
HGB BLD-MCNC: 14.9 G/DL — SIGNIFICANT CHANGE UP (ref 11.5–15.5)
IMM GRANULOCYTES NFR BLD AUTO: 0.7 % — SIGNIFICANT CHANGE UP (ref 0–0.9)
INR BLD: 0.96 RATIO — SIGNIFICANT CHANGE UP (ref 0.85–1.18)
LYMPHOCYTES # BLD AUTO: 2.55 K/UL — SIGNIFICANT CHANGE UP (ref 1–3.3)
LYMPHOCYTES # BLD AUTO: 25.2 % — SIGNIFICANT CHANGE UP (ref 13–44)
MAGNESIUM SERPL-MCNC: 2 MG/DL — SIGNIFICANT CHANGE UP (ref 1.6–2.6)
MCHC RBC-ENTMCNC: 31.2 PG — SIGNIFICANT CHANGE UP (ref 27–34)
MCHC RBC-ENTMCNC: 34 GM/DL — SIGNIFICANT CHANGE UP (ref 32–36)
MCV RBC AUTO: 91.6 FL — SIGNIFICANT CHANGE UP (ref 80–100)
MONOCYTES # BLD AUTO: 0.46 K/UL — SIGNIFICANT CHANGE UP (ref 0–0.9)
MONOCYTES NFR BLD AUTO: 4.5 % — SIGNIFICANT CHANGE UP (ref 2–14)
NEUTROPHILS # BLD AUTO: 6.74 K/UL — SIGNIFICANT CHANGE UP (ref 1.8–7.4)
NEUTROPHILS NFR BLD AUTO: 66.6 % — SIGNIFICANT CHANGE UP (ref 43–77)
PHOSPHATE SERPL-MCNC: 3.6 MG/DL — SIGNIFICANT CHANGE UP (ref 2.4–4.7)
PLATELET # BLD AUTO: 344 K/UL — SIGNIFICANT CHANGE UP (ref 150–400)
POTASSIUM SERPL-MCNC: 3.9 MMOL/L — SIGNIFICANT CHANGE UP (ref 3.5–5.3)
POTASSIUM SERPL-SCNC: 3.9 MMOL/L — SIGNIFICANT CHANGE UP (ref 3.5–5.3)
PROT SERPL-MCNC: 7.3 G/DL — SIGNIFICANT CHANGE UP (ref 6.6–8.7)
PROTHROM AB SERPL-ACNC: 10.7 SEC — SIGNIFICANT CHANGE UP (ref 9.5–13)
RBC # BLD: 4.78 M/UL — SIGNIFICANT CHANGE UP (ref 3.8–5.2)
RBC # FLD: 13 % — SIGNIFICANT CHANGE UP (ref 10.3–14.5)
SODIUM SERPL-SCNC: 137 MMOL/L — SIGNIFICANT CHANGE UP (ref 135–145)
T3 SERPL-MCNC: 130 NG/DL — SIGNIFICANT CHANGE UP (ref 80–200)
T4 AB SER-ACNC: 7.1 UG/DL — SIGNIFICANT CHANGE UP (ref 4.5–12)
TSH SERPL-MCNC: 2.86 UIU/ML — SIGNIFICANT CHANGE UP (ref 0.27–4.2)
WBC # BLD: 10.12 K/UL — SIGNIFICANT CHANGE UP (ref 3.8–10.5)
WBC # FLD AUTO: 10.12 K/UL — SIGNIFICANT CHANGE UP (ref 3.8–10.5)

## 2024-04-24 PROCEDURE — 85730 THROMBOPLASTIN TIME PARTIAL: CPT

## 2024-04-24 PROCEDURE — 85610 PROTHROMBIN TIME: CPT

## 2024-04-24 PROCEDURE — G0463: CPT

## 2024-04-24 PROCEDURE — 84480 ASSAY TRIIODOTHYRONINE (T3): CPT

## 2024-04-24 PROCEDURE — 86901 BLOOD TYPING SEROLOGIC RH(D): CPT

## 2024-04-24 PROCEDURE — 80053 COMPREHEN METABOLIC PANEL: CPT

## 2024-04-24 PROCEDURE — 84100 ASSAY OF PHOSPHORUS: CPT

## 2024-04-24 PROCEDURE — 93005 ELECTROCARDIOGRAM TRACING: CPT

## 2024-04-24 PROCEDURE — 86900 BLOOD TYPING SEROLOGIC ABO: CPT

## 2024-04-24 PROCEDURE — 85025 COMPLETE CBC W/AUTO DIFF WBC: CPT

## 2024-04-24 PROCEDURE — 84436 ASSAY OF TOTAL THYROXINE: CPT

## 2024-04-24 PROCEDURE — 36415 COLL VENOUS BLD VENIPUNCTURE: CPT

## 2024-04-24 PROCEDURE — 83036 HEMOGLOBIN GLYCOSYLATED A1C: CPT

## 2024-04-24 PROCEDURE — 84443 ASSAY THYROID STIM HORMONE: CPT

## 2024-04-24 PROCEDURE — 86850 RBC ANTIBODY SCREEN: CPT

## 2024-04-24 PROCEDURE — 83735 ASSAY OF MAGNESIUM: CPT

## 2024-04-24 PROCEDURE — 93010 ELECTROCARDIOGRAM REPORT: CPT

## 2024-04-24 NOTE — H&P PST ADULT - NEUROLOGICAL COMMENTS
hx seizure 2019 and 2021 has been off keppra past 3 years ne seizure since 2021 EEG with DR. Bruna Louis normal  10/2023

## 2024-04-24 NOTE — H&P PST ADULT - ASSESSMENT
38 yr old Tanzanian speaking female presents to PST with history of Obesity ( 5'4 130.7 kg BMI 49.5)  seizure in  and   followed with Dr. Louis (EEG in 10/23 normal no current meds ) borderline DM , Hypothyroidism and kidney stones. Pt has been unable to loose weight and keep it off . Pt is scheduled for Robotic sleeve gastrectomy with DR. Hogna on 24. Will obtain a medical clearance. Pre op liquid diet prep 2 weeks prior as per Dr. Hogan no NSAids or asa 1 week preop. incentive spirometry reviewed . email sent to Sunitha ahn regarding medications.  OPIOID RISK TOOL    XAVIER EACH BOX THAT APPLIES AND ADD TOTALS AT THE END    FAMILY HISTORY OF SUBSTANCE ABUSE                 FEMALE         MALE                                                Alcohol                             [  ]1 pt          [  ]3pts                                               Illegal Durgs                     [  ]2 pts        [  ]3pts                                               Rx Drugs                           [  ]4 pts        [  ]4 pts    PERSONAL HISTORY OF SUBSTANCE ABUSE                                                                                          Alcohol                             [  ]3 pts       [  ]3 pts                                               Illegal Durgs                     [  ]4 pts        [  ]4 pts                                               Rx Drugs                           [  ]5 pts        [  ]5 pts    AGE BETWEEN 16-45 YEARS                                      [ X ]1 pt         [  ]1 pt    HISTORY OF PREADOLESCENT   SEXUAL ABUSE                                                             [  ]3 pts        [  ]0pts    PSYCHOLOGICAL DISEASE                     ADD, OCD, Bipolar, Schizophrenia        [  ]2 pts         [  ]2 pts                      Depression                                               [  ]1 pt           [  ]1 pt           SCORING TOTAL   (add numbers and type here)              (**1*)                                     A score of 3 or lower indicated LOW risk for future opiod abuse  A score of 4 to 7 indicated moderate risk for future opiod abuse  A score of 8 or higher indicates a high risk for opiod abuse  CAPRINI VTE 2.0 SCORE [CLOT updated 2019]    AGE RELATED RISK FACTORS                                                       MOBILITY RELATED FACTORS  [ ] Age 41-60 years                                            (1 Point)                    [ ] Bed rest                                                        (1 Point)  [ ] Age: 61-74 years                                           (2 Points)                  [ ] Plaster cast                                                   (2 Points)  [ ] Age= 75 years                                              (3 Points)                    [ ] Bed bound for more than 72 hours                 (2 Points)    DISEASE RELATED RISK FACTORS                                               GENDER SPECIFIC FACTORS  [ ] Edema in the lower extremities                       (1 Point)              [ ] Pregnancy                                                     (1 Point)  [ ] Varicose veins                                               (1 Point)                     [ ] Post-partum < 6 weeks                                   (1 Point)             [ x] BMI > 25 Kg/m2                                            (1 Point)                     [ ] Hormonal therapy  or oral contraception          (1 Point)                 [ ] Sepsis (in the previous month)                        (1 Point)               [ ] History of pregnancy complications                 (1 point)  [ ] Pneumonia or serious lung disease                                               [ ] Unexplained or recurrent                     (1 Point)           (in the previous month)                               (1 Point)  [ ] Abnormal pulmonary function test                     (1 Point)                 SURGERY RELATED RISK FACTORS  [ ] Acute myocardial infarction                              (1 Point)               [ ]  Section                                             (1 Point)  [ ] Congestive heart failure (in the previous month)  (1 Point)      [ ] Minor surgery                                                  (1 Point)   [ ] Inflammatory bowel disease                             (1 Point)               [ ] Arthroscopic surgery                                        (2 Points)  [ ] Central venous access                                      (2 Points)                [x ] General surgery lasting more than 45 minutes (2 points)  [ ] Malignancy- Present or previous                   (2 Points)                [ ] Elective arthroplasty                                         (5 points)    [ ] Stroke (in the previous month)                          (5 Points)                                                                                                                                                           HEMATOLOGY RELATED FACTORS                                                 TRAUMA RELATED RISK FACTORS  [ ] Prior episodes of VTE                                     (3 Points)                [ ] Fracture of the hip, pelvis, or leg                       (5 Points)  [ ] Positive family history for VTE                         (3 Points)             [ ] Acute spinal cord injury (in the previous month)  (5 Points)  [ ] Prothrombin 71361 A                                     (3 Points)               [ ] Paralysis  (less than 1 month)                             (5 Points)  [ ] Factor V Leiden                                             (3 Points)                  [ ] Multiple Trauma within 1 month                        (5 Points)  [ ] Lupus anticoagulants                                     (3 Points)                                                           [ ] Anticardiolipin antibodies                               (3 Points)                                                       [ ] High homocysteine in the blood                      (3 Points)                                             [ ] Other congenital or acquired thrombophilia      (3 Points)                                                [ ] Heparin induced thrombocytopenia                  (3 Points)                                     Total Score [     3     ]

## 2024-04-24 NOTE — H&P PST ADULT - ATTENDING COMMENTS
I have reviewed the history and physical – recorded 1 to 30 days prior the procedure – and I have re-examined the patient (so as to update any changes in the patient's health status), and I state that it is still appropriate with my corrections and/or amendments as documented.     Patient reports no interval changes to overall health status or medical history since our previous encounter.      I have had a lengthy conversation with the patient and have discussed my impressions and treatment plans with the patient.  Detailed informed consent and potential risks , benefits and alternatives to the planned surgery (robotic-assisted laparoscopic sleeve gastrectomy) were once again reinforced and reviewed, including but not limited to:  bleeding, infection, esophageal/gastric/intestinal or other visceral or solid organ injury; leak, sepsis, death; new onset or worsening reflux, inability to perform the intended procedure, inability to lose desired amount of weight, regain of weight; port site or incisional hernia, need for other procedure or re-operation, perioperative myocardial infarction, stroke, deep vein thrombosis, pulmonary embolus, pneumonia and death.  All surgical options were discussed including non-surgical treatments. The patient remains interested in a sleeve gastrectomy for weight loss.     The weight loss surgery education packet as well as the nutrition education packet have been reviewed and given to the patient previously, and I also provided patient with detailed information regarding the post-operative instructions and expectations.  The patient is aware of the expected immediate post operative course, length of stay and discharge plan for the sleeve gastrectomy procedure.   The patient had the opportunity to ask pertinent questions, and all of patient’s questions and concerns were addressed to patient’s satisfaction.  Patient verbalized an understanding of the information discussed, and wishes to proceed with surgery. Informed consent signed.

## 2024-04-24 NOTE — H&P PST ADULT - HISTORY OF PRESENT ILLNESS
38 yr old Russian speaking female presents to PST with history of Obesity ( 5'4 130.7 kg BMI 49.5)  seizure in 2019 and 2021  followed with Dr. Louis (EEG in 10/23 normal no current meds ) borderline DM , Hypothyroidism and kidney stones. Pt has been unable to loose weight and keep it off . Pt is scheduled for Robotic sleeve gastrectomy with DR. Lynne on 5/14/24. Will obtain a medical clearance.

## 2024-04-24 NOTE — H&P PST ADULT - NEUROLOGICAL
details… Seizure in 2021 no meds EEG 2023 normal with DR. Louis/normal/cranial nerves II-XII intact/sensation intact

## 2024-04-26 NOTE — PHARMACOTHERAPY INTERVENTION NOTE - COMMENTS
Sleeve gastrectomy scheduled for 5/14/2024.  Patient medication reconciliation completed. Patient currently taking:     Home Medications:  levothyroxine 25 mcg (0.025 mg) oral tablet: 1 tab(s) orally once a day (24 Apr 2024 12:30)  potassium citrate 15 mEq oral tablet, extended release: 2 tab(s) orally 3 times a day (26 Apr 2024 13:03)    Patient was informed to take daily multivitamins post surgically. Patient reeducated on NSAID avoidance (ibuprofen, ASA, naproxen, aleve) as they increased risk of GI bleeding; may use APAP for mild pain otherwise contact prescriber for consult. Patient was informed on indications and directions for administration for hyoscyamine SL, acetaminophen liquid, ondansetron ODT, and omeprazole . Patient was instructed to take the medications as follows:     Continue home medications Spoke with patient via  services.  Sleeve gastrectomy scheduled for 5/14/2024.  Patient medication reconciliation completed. Patient currently taking:     Home Medications:  levothyroxine 25 mcg (0.025 mg) oral tablet: 1 tab(s) orally once a day (24 Apr 2024 12:30)  potassium citrate 15 mEq oral tablet, extended release: 2 tab(s) orally 3 times a day (26 Apr 2024 13:03)    Patient was informed to take daily multivitamins post surgically. Patient reeducated on NSAID avoidance (ibuprofen, ASA, naproxen, aleve) as they increased risk of GI bleeding; may use APAP for mild pain otherwise contact prescriber for consult. Patient was informed on indications and directions for administration for hyoscyamine SL, acetaminophen liquid, ondansetron ODT, and omeprazole . Patient was instructed to take the medications as follows:     Continue home medications

## 2024-04-26 NOTE — PHARMACOTHERAPY INTERVENTION NOTE - INTERVENTION CATEGORIES
Patient Education Patient with recurrent atrial fibrillation post-operatively. She is in irregular rhythm on exam and EKG in the office today. Will plan for upcoming cardioversion. The current atrial fibrillation may be contributing to her degree of chest discomfort. Continue anticoagulation therapy with Eliquis, which she is tolerating well.

## 2024-04-29 ENCOUNTER — APPOINTMENT (OUTPATIENT)
Dept: PULMONOLOGY | Facility: CLINIC | Age: 39
End: 2024-04-29
Payer: COMMERCIAL

## 2024-04-29 VITALS — WEIGHT: 293 LBS | BODY MASS INDEX: 52.57 KG/M2 | HEIGHT: 62.5 IN

## 2024-04-29 DIAGNOSIS — Z01.818 ENCOUNTER FOR OTHER PREPROCEDURAL EXAMINATION: ICD-10-CM

## 2024-04-29 PROCEDURE — 94010 BREATHING CAPACITY TEST: CPT

## 2024-05-02 ENCOUNTER — OUTPATIENT (OUTPATIENT)
Dept: OUTPATIENT SERVICES | Facility: HOSPITAL | Age: 39
LOS: 1 days | End: 2024-05-02
Payer: COMMERCIAL

## 2024-05-02 ENCOUNTER — APPOINTMENT (OUTPATIENT)
Dept: PULMONOLOGY | Facility: CLINIC | Age: 39
End: 2024-05-02
Payer: COMMERCIAL

## 2024-05-02 VITALS
SYSTOLIC BLOOD PRESSURE: 128 MMHG | HEART RATE: 76 BPM | OXYGEN SATURATION: 98 % | DIASTOLIC BLOOD PRESSURE: 78 MMHG | RESPIRATION RATE: 15 BRPM

## 2024-05-02 DIAGNOSIS — Z98.890 OTHER SPECIFIED POSTPROCEDURAL STATES: Chronic | ICD-10-CM

## 2024-05-02 DIAGNOSIS — J98.11 ATELECTASIS: ICD-10-CM

## 2024-05-02 DIAGNOSIS — Z01.811 ENCOUNTER FOR PREPROCEDURAL RESPIRATORY EXAMINATION: ICD-10-CM

## 2024-05-02 DIAGNOSIS — E66.01 MORBID (SEVERE) OBESITY DUE TO EXCESS CALORIES: ICD-10-CM

## 2024-05-02 DIAGNOSIS — N20.0 CALCULUS OF KIDNEY: Chronic | ICD-10-CM

## 2024-05-02 PROCEDURE — 99204 OFFICE O/P NEW MOD 45 MIN: CPT

## 2024-05-02 PROCEDURE — 71046 X-RAY EXAM CHEST 2 VIEWS: CPT | Mod: 26

## 2024-05-02 PROCEDURE — 99215 OFFICE O/P EST HI 40 MIN: CPT | Mod: 1L

## 2024-05-02 PROCEDURE — 99214 OFFICE O/P EST MOD 30 MIN: CPT

## 2024-05-02 NOTE — PHYSICAL EXAM
[No Acute Distress] : no acute distress [Low Lying Soft Palate] : low lying soft palate [Elongated Uvula] : elongated uvula [II] : Mallampati Class: II [Supple] : supple [Normal Rate/Rhythm] : normal rate/rhythm [Normal S1, S2] : normal s1, s2 [No Resp Distress] : no resp distress [No Acc Muscle Use] : no acc muscle use [Normal Rhythm and Effort] : normal rhythm and effort [Clear to Auscultation Bilaterally] : clear to auscultation bilaterally [Benign] : benign [Normal Color/ Pigmentation] : normal color/ pigmentation [Oriented x3] : oriented x3 [Normal Mood] : normal mood [Normal Affect] : normal affect

## 2024-05-02 NOTE — CONSULT LETTER
[Dear  ___] : Dear  [unfilled], [Consult Letter:] : I had the pleasure of evaluating your patient, [unfilled]. [Consult Closing:] : Thank you very much for allowing me to participate in the care of this patient.  If you have any questions, please do not hesitate to contact me. [DrOllie  ___] : Dr. NORRIS

## 2024-05-06 NOTE — REVIEW OF SYSTEMS
Neuropathic pain [Negative] : Neurologic [TextBox_3] : per HPI [TextBox_30] : per HPI [TextBox_144] : per HPI

## 2024-05-06 NOTE — PROCEDURE
[FreeTextEntry1] : cxr done 8/1/23: atx, tr pl effusion  spirometry done 4/29/24: normal  review of sleep study

## 2024-05-06 NOTE — HISTORY OF PRESENT ILLNESS
[Never] : never [TextBox_4] : Planning on gastric bypass with Dr Lynne on 5/14/24 at [facility].   Denies any sob, wheeze, cough.  Spirometry 4/29/24 was normal.  CXR done last summer at time of kidney stone showed some atx, tr pl eff.   HST done 9/22/23 was neg for CLOVIS.  Never smoked.   Not working.

## 2024-05-06 NOTE — PLAN
[TextEntry] : CXR to be done now to assess prior to surgery. Further reccs will come.   ADDENDUM:  CXR done 5/2/24: no acute dz, clear.  No pulmonary contraindications to planned bariatric surgery. IS postop. Call with any further questions.

## 2024-05-06 NOTE — ASSESSMENT
[FreeTextEntry1] : Morbid obesity, planned on bariatric surgery. Never smoked. No respiratory symptoms. Morgan normal. No CLOVIS on HST. Latest cxr with atx, pl eff.

## 2024-05-06 NOTE — REASON FOR VISIT
[Initial] : an initial visit [Pre-op Risk Stratification] : pre-op risk stratification [[employer] ] : provided by [employer]   [Interpreters_IDNumber] : [id number] [Interpreters_FullName] : Gill [TWNoteComboBox1] : Faroese

## 2024-05-13 ENCOUNTER — TRANSCRIPTION ENCOUNTER (OUTPATIENT)
Age: 39
End: 2024-05-13

## 2024-05-13 NOTE — ASU PATIENT PROFILE, ADULT - ABLE TO REACH PT
Subjective:   Farhana Gunter presents s/p Rubio's type procedure.   She is doing well.   No complaints of any pain.  Her midline incision has completely healed.  No fever or chills.  No nausea or vomiting.  She is started to regain weight.  No issues with her ostomy.    Objective:   Visit Vitals  /85 (BP Location: LUE - Left upper extremity, Patient Position: Sitting, Cuff Size: Regular)   Pulse 80   Temp 98 °F (36.7 °C) (Oral)   Resp 18   Ht 5' 3\" (1.6 m)   Wt 72.6 kg (160 lb)   SpO2 98%   BMI 28.34 kg/m²        Physical Exam:  Patient appears well  Abdominal exam is benign, midline incision is well-healed, ostomy is both viable and functional    Assessment and Plan:   Problem List Items Addressed This Visit     None      Visit Diagnoses     S/P partial colectomy    -  Primary          She is recovering well from surgery.   She was advised that attempts at colostomy reversal are more than feasible.  However, she must personally come to that decision.  May return to clinic as needed.    Turner Cottrell MD, FACS       yes

## 2024-05-14 ENCOUNTER — INPATIENT (INPATIENT)
Facility: HOSPITAL | Age: 39
LOS: 2 days | Discharge: HOME | DRG: 641 | End: 2024-05-17
Attending: SURGERY | Admitting: SURGERY
Payer: COMMERCIAL

## 2024-05-14 ENCOUNTER — APPOINTMENT (OUTPATIENT)
Dept: SURGERY | Facility: HOSPITAL | Age: 39
End: 2024-05-14

## 2024-05-14 ENCOUNTER — RESULT REVIEW (OUTPATIENT)
Age: 39
End: 2024-05-14

## 2024-05-14 ENCOUNTER — TRANSCRIPTION ENCOUNTER (OUTPATIENT)
Age: 39
End: 2024-05-14

## 2024-05-14 VITALS
HEIGHT: 62 IN | OXYGEN SATURATION: 98 % | TEMPERATURE: 98 F | DIASTOLIC BLOOD PRESSURE: 95 MMHG | SYSTOLIC BLOOD PRESSURE: 162 MMHG | RESPIRATION RATE: 16 BRPM | WEIGHT: 276.02 LBS | HEART RATE: 72 BPM

## 2024-05-14 DIAGNOSIS — Z90.49 ACQUIRED ABSENCE OF OTHER SPECIFIED PARTS OF DIGESTIVE TRACT: Chronic | ICD-10-CM

## 2024-05-14 DIAGNOSIS — Z98.890 OTHER SPECIFIED POSTPROCEDURAL STATES: Chronic | ICD-10-CM

## 2024-05-14 DIAGNOSIS — E66.01 MORBID (SEVERE) OBESITY DUE TO EXCESS CALORIES: ICD-10-CM

## 2024-05-14 DIAGNOSIS — N20.0 CALCULUS OF KIDNEY: Chronic | ICD-10-CM

## 2024-05-14 LAB
ANION GAP SERPL CALC-SCNC: 16 MMOL/L — SIGNIFICANT CHANGE UP (ref 5–17)
BASOPHILS # BLD AUTO: 0.05 K/UL — SIGNIFICANT CHANGE UP (ref 0–0.2)
BASOPHILS NFR BLD AUTO: 0.3 % — SIGNIFICANT CHANGE UP (ref 0–2)
BLD GP AB SCN SERPL QL: SIGNIFICANT CHANGE UP
BUN SERPL-MCNC: 14.3 MG/DL — SIGNIFICANT CHANGE UP (ref 8–20)
CALCIUM SERPL-MCNC: 8.6 MG/DL — SIGNIFICANT CHANGE UP (ref 8.4–10.5)
CHLORIDE SERPL-SCNC: 99 MMOL/L — SIGNIFICANT CHANGE UP (ref 96–108)
CO2 SERPL-SCNC: 18 MMOL/L — LOW (ref 22–29)
CREAT SERPL-MCNC: 0.73 MG/DL — SIGNIFICANT CHANGE UP (ref 0.5–1.3)
EGFR: 107 ML/MIN/1.73M2 — SIGNIFICANT CHANGE UP
EOSINOPHIL # BLD AUTO: 0.02 K/UL — SIGNIFICANT CHANGE UP (ref 0–0.5)
EOSINOPHIL NFR BLD AUTO: 0.1 % — SIGNIFICANT CHANGE UP (ref 0–6)
GLUCOSE BLDC GLUCOMTR-MCNC: 105 MG/DL — HIGH (ref 70–99)
GLUCOSE BLDC GLUCOMTR-MCNC: 149 MG/DL — HIGH (ref 70–99)
GLUCOSE BLDC GLUCOMTR-MCNC: 217 MG/DL — HIGH (ref 70–99)
GLUCOSE BLDC GLUCOMTR-MCNC: 227 MG/DL — HIGH (ref 70–99)
GLUCOSE BLDC GLUCOMTR-MCNC: 95 MG/DL — SIGNIFICANT CHANGE UP (ref 70–99)
GLUCOSE SERPL-MCNC: 183 MG/DL — HIGH (ref 70–99)
HCT VFR BLD CALC: 44.1 % — SIGNIFICANT CHANGE UP (ref 34.5–45)
HGB BLD-MCNC: 15.6 G/DL — HIGH (ref 11.5–15.5)
IMM GRANULOCYTES NFR BLD AUTO: 0.7 % — SIGNIFICANT CHANGE UP (ref 0–0.9)
LYMPHOCYTES # BLD AUTO: 1.74 K/UL — SIGNIFICANT CHANGE UP (ref 1–3.3)
LYMPHOCYTES # BLD AUTO: 11.4 % — LOW (ref 13–44)
MCHC RBC-ENTMCNC: 31.5 PG — SIGNIFICANT CHANGE UP (ref 27–34)
MCHC RBC-ENTMCNC: 35.4 GM/DL — SIGNIFICANT CHANGE UP (ref 32–36)
MCV RBC AUTO: 88.9 FL — SIGNIFICANT CHANGE UP (ref 80–100)
MONOCYTES # BLD AUTO: 0.24 K/UL — SIGNIFICANT CHANGE UP (ref 0–0.9)
MONOCYTES NFR BLD AUTO: 1.6 % — LOW (ref 2–14)
NEUTROPHILS # BLD AUTO: 13.05 K/UL — HIGH (ref 1.8–7.4)
NEUTROPHILS NFR BLD AUTO: 85.9 % — HIGH (ref 43–77)
PLATELET # BLD AUTO: 353 K/UL — SIGNIFICANT CHANGE UP (ref 150–400)
POTASSIUM SERPL-MCNC: 4.2 MMOL/L — SIGNIFICANT CHANGE UP (ref 3.5–5.3)
POTASSIUM SERPL-SCNC: 4.2 MMOL/L — SIGNIFICANT CHANGE UP (ref 3.5–5.3)
RBC # BLD: 4.96 M/UL — SIGNIFICANT CHANGE UP (ref 3.8–5.2)
RBC # FLD: 12.5 % — SIGNIFICANT CHANGE UP (ref 10.3–14.5)
SODIUM SERPL-SCNC: 133 MMOL/L — LOW (ref 135–145)
WBC # BLD: 15.2 K/UL — HIGH (ref 3.8–10.5)
WBC # FLD AUTO: 15.2 K/UL — HIGH (ref 3.8–10.5)

## 2024-05-14 PROCEDURE — 93010 ELECTROCARDIOGRAM REPORT: CPT

## 2024-05-14 PROCEDURE — 43775 LAP SLEEVE GASTRECTOMY: CPT | Mod: AS

## 2024-05-14 PROCEDURE — S2900 ROBOTIC SURGICAL SYSTEM: CPT | Mod: NC

## 2024-05-14 PROCEDURE — 43775 LAP SLEEVE GASTRECTOMY: CPT

## 2024-05-14 PROCEDURE — 88307 TISSUE EXAM BY PATHOLOGIST: CPT | Mod: 26

## 2024-05-14 DEVICE — CLIP APPLIER COVIDIEN ENDOCLIP III 5MM: Type: IMPLANTABLE DEVICE | Status: FUNCTIONAL

## 2024-05-14 DEVICE — TISSEEL 4ML: Type: IMPLANTABLE DEVICE | Status: FUNCTIONAL

## 2024-05-14 DEVICE — XI STAPLER SUREFORM RELOAD 60 WHITE: Type: IMPLANTABLE DEVICE | Status: FUNCTIONAL

## 2024-05-14 DEVICE — XI STAPLER SUREFORM RELOAD 60 BLUE: Type: IMPLANTABLE DEVICE | Status: FUNCTIONAL

## 2024-05-14 RX ORDER — SODIUM CHLORIDE 9 MG/ML
1000 INJECTION, SOLUTION INTRAVENOUS
Refills: 0 | Status: ACTIVE | OUTPATIENT
Start: 2024-05-14 | End: 2025-04-12

## 2024-05-14 RX ORDER — LEVOTHYROXINE SODIUM 125 MCG
25 TABLET ORAL DAILY
Refills: 0 | Status: ACTIVE | OUTPATIENT
Start: 2024-05-14 | End: 2025-04-12

## 2024-05-14 RX ORDER — DEXTROSE 50 % IN WATER 50 %
25 SYRINGE (ML) INTRAVENOUS ONCE
Refills: 0 | Status: ACTIVE | OUTPATIENT
Start: 2024-05-14

## 2024-05-14 RX ORDER — ONDANSETRON 8 MG/1
4 TABLET, FILM COATED ORAL EVERY 6 HOURS
Refills: 0 | Status: ACTIVE | OUTPATIENT
Start: 2024-05-14 | End: 2025-04-12

## 2024-05-14 RX ORDER — ACETAMINOPHEN 500 MG
1000 TABLET ORAL EVERY 6 HOURS
Refills: 0 | Status: COMPLETED | OUTPATIENT
Start: 2024-05-14 | End: 2024-05-15

## 2024-05-14 RX ORDER — KETOROLAC TROMETHAMINE 30 MG/ML
15 SYRINGE (ML) INJECTION EVERY 6 HOURS
Refills: 0 | Status: ACTIVE | OUTPATIENT
Start: 2024-05-14

## 2024-05-14 RX ORDER — SODIUM CHLORIDE 9 MG/ML
1000 INJECTION, SOLUTION INTRAVENOUS
Refills: 0 | Status: ACTIVE | OUTPATIENT
Start: 2024-05-14

## 2024-05-14 RX ORDER — METOCLOPRAMIDE HCL 10 MG
10 TABLET ORAL EVERY 6 HOURS
Refills: 0 | Status: ACTIVE | OUTPATIENT
Start: 2024-05-14 | End: 2025-04-12

## 2024-05-14 RX ORDER — KETOROLAC TROMETHAMINE 30 MG/ML
15 SYRINGE (ML) INJECTION ONCE
Refills: 0 | Status: DISCONTINUED | OUTPATIENT
Start: 2024-05-14 | End: 2024-05-14

## 2024-05-14 RX ORDER — INSULIN LISPRO 100/ML
VIAL (ML) SUBCUTANEOUS
Refills: 0 | Status: ACTIVE | OUTPATIENT
Start: 2024-05-14 | End: 2025-04-12

## 2024-05-14 RX ORDER — SODIUM CHLORIDE 9 MG/ML
1 INJECTION INTRAMUSCULAR; INTRAVENOUS; SUBCUTANEOUS ONCE
Refills: 0 | Status: COMPLETED | OUTPATIENT
Start: 2024-05-14 | End: 2024-05-15

## 2024-05-14 RX ORDER — SODIUM CHLORIDE 9 MG/ML
1000 INJECTION, SOLUTION INTRAVENOUS
Refills: 0 | Status: DISCONTINUED | OUTPATIENT
Start: 2024-05-14 | End: 2024-05-14

## 2024-05-14 RX ORDER — HEPARIN SODIUM 5000 [USP'U]/ML
7500 INJECTION INTRAVENOUS; SUBCUTANEOUS EVERY 8 HOURS
Refills: 0 | Status: DISCONTINUED | OUTPATIENT
Start: 2024-05-14 | End: 2024-05-15

## 2024-05-14 RX ORDER — GLUCAGON INJECTION, SOLUTION 0.5 MG/.1ML
1 INJECTION, SOLUTION SUBCUTANEOUS ONCE
Refills: 0 | Status: ACTIVE | OUTPATIENT
Start: 2024-05-14 | End: 2025-04-12

## 2024-05-14 RX ORDER — ACETAMINOPHEN 500 MG
1000 TABLET ORAL ONCE
Refills: 0 | Status: DISCONTINUED | OUTPATIENT
Start: 2024-05-14 | End: 2024-05-14

## 2024-05-14 RX ORDER — SODIUM CHLORIDE 9 MG/ML
3 INJECTION INTRAMUSCULAR; INTRAVENOUS; SUBCUTANEOUS EVERY 8 HOURS
Refills: 0 | Status: DISCONTINUED | OUTPATIENT
Start: 2024-05-14 | End: 2024-05-14

## 2024-05-14 RX ORDER — SODIUM CHLORIDE 9 MG/ML
500 INJECTION, SOLUTION INTRAVENOUS ONCE
Refills: 0 | Status: COMPLETED | OUTPATIENT
Start: 2024-05-14 | End: 2024-05-14

## 2024-05-14 RX ORDER — ONDANSETRON 8 MG/1
4 TABLET, FILM COATED ORAL ONCE
Refills: 0 | Status: DISCONTINUED | OUTPATIENT
Start: 2024-05-14 | End: 2024-05-14

## 2024-05-14 RX ORDER — KETOROLAC TROMETHAMINE 30 MG/ML
15 SYRINGE (ML) INJECTION EVERY 6 HOURS
Refills: 0 | Status: DISCONTINUED | OUTPATIENT
Start: 2024-05-14 | End: 2024-05-15

## 2024-05-14 RX ORDER — DEXTROSE 10 % IN WATER 10 %
125 INTRAVENOUS SOLUTION INTRAVENOUS ONCE
Refills: 0 | Status: ACTIVE | OUTPATIENT
Start: 2024-05-14 | End: 2025-04-12

## 2024-05-14 RX ORDER — PANTOPRAZOLE SODIUM 20 MG/1
40 TABLET, DELAYED RELEASE ORAL EVERY 24 HOURS
Refills: 0 | Status: ACTIVE | OUTPATIENT
Start: 2024-05-14 | End: 2025-04-12

## 2024-05-14 RX ORDER — CEFAZOLIN SODIUM 1 G
2000 VIAL (EA) INJECTION ONCE
Refills: 0 | Status: DISCONTINUED | OUTPATIENT
Start: 2024-05-14 | End: 2024-05-14

## 2024-05-14 RX ORDER — BUPIVACAINE 13.3 MG/ML
20 INJECTION, SUSPENSION, LIPOSOMAL INFILTRATION ONCE
Refills: 0 | Status: DISCONTINUED | OUTPATIENT
Start: 2024-05-14 | End: 2024-05-14

## 2024-05-14 RX ORDER — DEXTROSE 50 % IN WATER 50 %
15 SYRINGE (ML) INTRAVENOUS ONCE
Refills: 0 | Status: ACTIVE | OUTPATIENT
Start: 2024-05-14 | End: 2025-04-12

## 2024-05-14 RX ORDER — HALOPERIDOL DECANOATE 100 MG/ML
0.5 INJECTION INTRAMUSCULAR EVERY 4 HOURS
Refills: 0 | Status: DISCONTINUED | OUTPATIENT
Start: 2024-05-14 | End: 2024-05-14

## 2024-05-14 RX ORDER — DEXTROSE 50 % IN WATER 50 %
12.5 SYRINGE (ML) INTRAVENOUS ONCE
Refills: 0 | Status: ACTIVE | OUTPATIENT
Start: 2024-05-14

## 2024-05-14 RX ORDER — HYOSCYAMINE SULFATE 0.13 MG
0.12 TABLET ORAL EVERY 4 HOURS
Refills: 0 | Status: ACTIVE | OUTPATIENT
Start: 2024-05-14 | End: 2025-04-12

## 2024-05-14 RX ORDER — FENTANYL CITRATE 50 UG/ML
25 INJECTION INTRAVENOUS
Refills: 0 | Status: DISCONTINUED | OUTPATIENT
Start: 2024-05-14 | End: 2024-05-14

## 2024-05-14 RX ORDER — HYDROMORPHONE HYDROCHLORIDE 2 MG/ML
0.5 INJECTION INTRAMUSCULAR; INTRAVENOUS; SUBCUTANEOUS
Refills: 0 | Status: DISCONTINUED | OUTPATIENT
Start: 2024-05-14 | End: 2024-05-14

## 2024-05-14 RX ADMIN — Medication 400 MILLIGRAM(S): at 13:52

## 2024-05-14 RX ADMIN — ONDANSETRON 4 MILLIGRAM(S): 8 TABLET, FILM COATED ORAL at 12:45

## 2024-05-14 RX ADMIN — Medication 15 MILLIGRAM(S): at 21:18

## 2024-05-14 RX ADMIN — Medication 1000 MILLIGRAM(S): at 21:26

## 2024-05-14 RX ADMIN — ONDANSETRON 4 MILLIGRAM(S): 8 TABLET, FILM COATED ORAL at 17:07

## 2024-05-14 RX ADMIN — PANTOPRAZOLE SODIUM 40 MILLIGRAM(S): 20 TABLET, DELAYED RELEASE ORAL at 12:45

## 2024-05-14 RX ADMIN — Medication 15 MILLIGRAM(S): at 18:07

## 2024-05-14 RX ADMIN — Medication 400 MILLIGRAM(S): at 21:18

## 2024-05-14 RX ADMIN — HEPARIN SODIUM 7500 UNIT(S): 5000 INJECTION INTRAVENOUS; SUBCUTANEOUS at 21:20

## 2024-05-14 RX ADMIN — SODIUM CHLORIDE 125 MILLILITER(S): 9 INJECTION, SOLUTION INTRAVENOUS at 17:06

## 2024-05-14 RX ADMIN — SODIUM CHLORIDE 1000 MILLILITER(S): 9 INJECTION, SOLUTION INTRAVENOUS at 22:34

## 2024-05-14 RX ADMIN — HEPARIN SODIUM 7500 UNIT(S): 5000 INJECTION INTRAVENOUS; SUBCUTANEOUS at 15:41

## 2024-05-14 RX ADMIN — Medication 15 MILLIGRAM(S): at 17:07

## 2024-05-14 RX ADMIN — Medication 15 MILLIGRAM(S): at 21:26

## 2024-05-14 RX ADMIN — Medication 2: at 17:07

## 2024-05-14 RX ADMIN — Medication 15 MILLIGRAM(S): at 15:42

## 2024-05-14 NOTE — BRIEF OPERATIVE NOTE - OPERATION/FINDINGS
No hiatal hernia seen.  Longitudinal sleeve gastrectomy formed over 36Fr [id number], with hemostatic staple line and negative leak test.

## 2024-05-14 NOTE — DISCHARGE NOTE PROVIDER - NS AS DC PROVIDER CONTACT Y/N MULTI
Anesthesia Type: 2% lidocaine without epinephrine
Consent: The patient's consent was obtained including but not limited to risks of crusting, scabbing, blistering, scarring, darker or lighter pigmentary change, recurrence, incomplete removal and infection.
Include Z78.9 (Other Specified Conditions Influencing Health Status) As An Associated Diagnosis?: Yes
Medical Necessity Information: It is in your best interest to select a reason for this procedure from the list below. All of these items fulfill various CMS LCD requirements except the new and changing color options.
Treatment Number (Will Not Render If 0): 20
Add 52 Modifier (Optional): no
Yes
Medical Necessity Clause: This procedure was medically necessary because the lesions that were treated were:
Anesthesia Volume In Cc: 0.5
Detail Level: Detailed
Post-Care Instructions: I reviewed with the patient in detail post-care instructions. Patient is to wear sunprotection, and avoid picking at any of the treated lesions. Pt may apply Vaseline to crusted or scabbing areas.

## 2024-05-14 NOTE — DISCHARGE NOTE PROVIDER - CARE PROVIDER_API CALL
Nick Lynne)  Surgery  [address] [facility] [address]  [place] [address]  Phone: [phone]  Fax: [phone]  Follow Up Time:    Nick Lynne)  Surgery  [address] [facility]  [place] [address]  Phone: [phone]  Fax: [phone]  Follow Up Time: 2 weeks

## 2024-05-14 NOTE — DISCHARGE NOTE PROVIDER - NSDCFUSCHEDAPPT_GEN_ALL_CORE_FT
Nick Lynne E  [facility] Physician [facility]  GENSURG [address]  Scheduled Appointment: 05/29/2024    [facility]  [address]TRASND  [address]  Scheduled Appointment: 06/06/2024    Jesu Perkins  [facility] Physician [facility]  UROLOGY [address]  Scheduled Appointment: 06/12/2024    Lien Patrick E  [facility]  GENSURG [address]  Scheduled Appointment: 06/25/2024

## 2024-05-14 NOTE — DISCHARGE NOTE PROVIDER - NSDCCPCAREPLAN_GEN_ALL_CORE_FT
PRINCIPAL DISCHARGE DIAGNOSIS  Diagnosis: Severe obesity (BMI >= 40)  Assessment and Plan of Treatment: BATHING: Please do not submerge wound underwater. You may shower starting post op day #2. Remove outer clean dressings on post op day #5. If you have steri strips leave them in place until they fall off on their own.   ACTIVITY: No heavy lifting or straining. Otherwise, you may return to your usual level of physical activity. If you are taking narcotic pain medication (such as Percocet) DO NOT drive a car, operate machinery or make important decisions.  DIET: Please follow Bariatric diet protocol. You may begin your full liquids when returning home. Encourage protein filled liquids.     RETURN TO THE EMERGENCY DEPARTMENT for any of the following - worsening pain, fever/chills, nausea/vomiting, altered mental status, chest pain, shortness of breath, or any other new / worsening symptom. to your usual diet.  NOTIFY YOUR SURGEON IF: You have any bleeding that does not stop, any pus draining from your wound(s), any fever (over 100.4 F) or chills, persistent nausea/vomiting, persistent diarrhea, or if your pain is not controlled on your discharge medications.  FOLLOW-UP: Please follow up with your primary care physician within 3-4weeks after surgery and your bariatric surgeon as discussed. also ensure follow up and continued communication with your dietary team and other support services.

## 2024-05-14 NOTE — DISCHARGE NOTE PROVIDER - HOSPITAL COURSE
On 5/14/24, Leah Harrison who has a history of seizures, DM, hypothyroid, kidney stones, appendectomy and ovarian cystectomy and morbid obesity BMI 50.5, underwent Robotic Assisted Sleeve Gastrectomy. Bariatric ERAS protocol followed to include preoperative and perioperative use of DVT and SSI prophylaxis as well as multi-modal non-opioid analgesia. Patient tolerated the procedure well extubated in the operating room then transferred to the PACU in stable condition. Once hemodynamically stable and effective pain control the patient was able to ambulate with assistance. Pt was also able to void within 4 hours following the procedure. The patient was later transferred to a bariatric unit and placed on bedside continuous pulse oximetry. Pain management included IV multi-modal non-opioid analgesia then transitioned to liquid as needed. The patient tolerated bariatric clear liquid the evening of surgery.    On POD #1 patient remained stable with no acute events overnight, has effective pain control. Denies nausea and vomiting. Patient is ambulating independently and voiding as expected. The rest of the hospital course was uneventful, patient met 8-Point Bariatric Surgery D/C criteria and subsequently cleared for discharge home on POD#1. ... extended VTE prophylaxis is necessary ([facility] [place] VTE Risk Stratification Risk %). The patient will follow a protocol-derived staged meal progression supervised by the dietitian in the outpatient setting. Patient will follow-up with Dr. Levin.. . An appointment will be made with a medical doctor and dietitian within 30 days. [facility] prior to discharge. Written discharge instruction explained and given to include VTE prevention.     On 5/14/24, Leah Harrison who has a history of seizures, DM, hypothyroid, kidney stones, appendectomy and ovarian cystectomy and morbid obesity BMI 50.5, underwent Robotic Assisted Sleeve Gastrectomy. Bariatric ERAS protocol followed to include preoperative and perioperative use of DVT and SSI prophylaxis as well as multi-modal non-opioid analgesia. Patient tolerated the procedure well extubated in the operating room then transferred to the PACU in stable condition. Once hemodynamically stable and effective pain control the patient was able to ambulate with assistance. Pt was also able to void within 4 hours following the procedure. The patient was later transferred to a bariatric unit and placed on bedside continuous pulse oximetry. Pain management included IV multi-modal non-opioid analgesia then transitioned to liquid as needed. The patient tolerated bariatric clear liquid the evening of surgery.    On the evening of surgery patient had an episode of near syncope earlier when patient attempted to walk. She started reporting feeling dizzy and was safely returned to bed, she became mildly tachycardic which resolved with the bolus. Patient was evaluated by surgical team, a bolus of IVF was given and we continued to monitor vital signs.  At that moment patient denied any abdominal pain, nausea or vomiting.  On POD 1, patient continued to have tachycardia and hemoglobin levels were downtrending.  Patient received a total of 2 units of packed red blood cells which improved her heart rate.  Patient denied any abdominal pain or discomfort.  Patient was tolerating her clear liquid diet adequately with no issues.  On POD 2 patient continued to be tachycardic but remained normotensive, thought to be acute blood loss from staple line.  Patient had not responded adequately to the 2 unit of blood transfused the day before and a third unit was given which she responded to adequately.  Patient had remained well with no discomfort or abdominal pain, was ambulating without difficulty and was voiding freely.    The rest of the hospital course was uneventful, patient met 8-Point Bariatric Surgery D/C criteria and subsequently cleared for discharge home on POD#3.  Extended VTE prophylaxis is not necessary ([facility] [place] VTE Risk Stratification Risk 1%). The patient will follow a protocol-derived staged meal progression supervised by the dietitian in the outpatient setting. Patient will follow-up with Dr. Lynne . An appointment will be made with a medical doctor and dietitian within 30 days. All appropriate [facility] prior to discharge. Written discharge instruction explained and given.     On 5/14/24, Leah Harrison who has a history of seizures, DM, hypothyroid, kidney stones, appendectomy and ovarian cystectomy and morbid obesity BMI 50.5, underwent Robotic Assisted Sleeve Gastrectomy. Bariatric ERAS protocol followed to include preoperative and perioperative use of DVT and SSI prophylaxis as well as multi-modal non-opioid analgesia. Patient tolerated the procedure well extubated in the operating room then transferred to the PACU in stable condition. Once hemodynamically stable and effective pain control the patient was able to ambulate with assistance. Pt was also able to void within 4 hours following the procedure. The patient was later transferred to a bariatric unit and placed on bedside continuous pulse oximetry. Pain management included IV multi-modal non-opioid analgesia then transitioned to liquid as needed. The patient tolerated bariatric clear liquid the evening of surgery.    On the evening of surgery patient had an episode of near syncope earlier when patient attempted to walk. She started reporting feeling dizzy and was safely returned to bed, she became mildly tachycardic which resolved with the bolus. Patient was evaluated by surgical team, a bolus of IVF was given and we continued to monitor vital signs.  At that moment patient denied any abdominal pain, nausea or vomiting.  On POD 1, patient continued to have tachycardia and hemoglobin levels were downtrending.  Patient received a total of 2 units of packed red blood cells which improved her heart rate.  Patient denied any abdominal pain or discomfort.  Patient was tolerating her clear liquid diet adequately with no issues.  On POD 2 patient continued to be tachycardic but remained normotensive, thought to be acute blood loss from staple line.  Patient had not responded adequately to the 2 unit of blood transfused the day before and a third unit was given which she responded to adequately.  Patient had remained well with no discomfort or abdominal pain, was ambulating without difficulty and was voiding freely.    The rest of the hospital course was uneventful, patient met Bariatric Surgery D/C criteria and subsequently cleared for discharge home on POD#3.  Extended VTE prophylaxis is not necessary ([facility] [place] VTE Risk Stratification Risk 1%). Patient noted to have a Pain score of 4  ,based on Likert pain scale. The patient will follow a protocol-derived staged meal progression supervised by the dietitian in the outpatient setting. Patient will follow-up with Dr. Lynne . An appointment will be made with a medical doctor and dietitian within 30 days. All appropriate [facility] from [facility] prior to discharge. Written discharge instruction explained and given.

## 2024-05-14 NOTE — CHART NOTE - NSCHARTNOTEFT_GEN_A_CORE
Post-op Check    Subjective:  Ptcomplaint of mild pain in epigastric/LUQ area. Pain otherwise well controlled on current regiment. Denies nausea, vomiting, chest pain, SOB, palpitations.     STATUS POST:  Gastric sleeve    POST OPERATIVE DAY #: 0    MEDICATIONS  (STANDING):  acetaminophen   IVPB .. 1000 milliGRAM(s) IV Intermittent every 6 hours  dextrose 10% Bolus 125 milliLiter(s) IV Bolus once  dextrose 5%. 1000 milliLiter(s) (50 mL/Hr) IV Continuous <Continuous>  dextrose 5%. 1000 milliLiter(s) (100 mL/Hr) IV Continuous <Continuous>  dextrose 50% Injectable 25 Gram(s) IV Push once  dextrose 50% Injectable 12.5 Gram(s) IV Push once  glucagon  Injectable 1 milliGRAM(s) IntraMuscular once  heparin   Injectable 7500 Unit(s) SubCutaneous every 8 hours  insulin lispro (ADMELOG) corrective regimen sliding scale   SubCutaneous three times a day before meals  ketorolac   Injectable 15 milliGRAM(s) IV Push every 6 hours  lactated ringers. 1000 milliLiter(s) (100 mL/Hr) IV Continuous <Continuous>  lactated ringers. 1000 milliLiter(s) (125 mL/Hr) IV Continuous <Continuous>  levothyroxine 25 MICROGram(s) Oral daily  ondansetron Injectable 4 milliGRAM(s) IV Push every 6 hours  pantoprazole  Injectable 40 milliGRAM(s) IV Push every 24 hours  sodium chloride 0.9% 1000 milliLiter(s) (250 mL/Hr) IV Continuous <Continuous>    MEDICATIONS  (PRN):  dextrose Oral Gel 15 Gram(s) Oral once PRN Blood Glucose LESS THAN 70 milliGRAM(s)/deciliter  fentaNYL    Injectable 25 MICROGram(s) IV Push every 5 minutes PRN Moderate Pain (4 - 6)  hyoscyamine SL 0.125 milliGRAM(s) SubLingual every 4 hours PRN Gastric Spasms  ketorolac   Injectable 15 milliGRAM(s) IV Push every 6 hours PRN breakthrough  LORazepam   Injectable 0.5 milliGRAM(s) IV Push once PRN Esophageal Spasm  metoclopramide Injectable 10 milliGRAM(s) IV Push every 6 hours PRN nausea refractory to zofran  ondansetron Injectable 4 milliGRAM(s) IV Push once PRN Nausea and/or Vomiting      Vital Signs Last 24 Hrs  T(C): 36.4 (14 May 2024 10:55), Max: 36.4 (14 May 2024 06:12)  T(F): 97.6 (14 May 2024 10:55), Max: 97.6 (14 May 2024 10:55)  HR: 86 (14 May 2024 14:00) (72 - 88)  BP: 145/97 (14 May 2024 14:00) (145/97 - 162/95)  BP(mean): 112 (14 May 2024 14:00) (112 - 126)  RR: 20 (14 May 2024 14:00) (16 - 28)  SpO2: 95% (14 May 2024 14:00) (94% - 100%)    Parameters below as of 14 May 2024 14:00  Patient On (Oxygen Delivery Method): room air        Physical Exam:    General: Laying comfortably in bed, NAD  HEENT: PERRL, EOMI  Neck: No JVD, FROM without pain  Respiratory: no accessory muscle use, respirations non-labored  Abdomen: soft, appropriately tender, non-distended, incisions c/d/i  Neurological: A&O x 3; without gross deficit    A: 38F s/p gastric sleeve doing well, voided and is ambulating, transferring to surgical unit shortly    P:  Continue current care  May start Bob clears  Labs reviewed, NA repleated  Pain control  OOB as tolerated  Encourage IS  DVT ppx

## 2024-05-14 NOTE — CHART NOTE - NSCHARTNOTEFT_GEN_A_CORE
Called by RN for episode of near syncope earlier when patient attempted to walk. She started reporting feeling dizzy and was safely returned to bed. I evaluated the patient at bedside. She reports that she is feeling fine now. She denies any CP, SOB, N/V, abdominal pain, or feeling of palpitation. Monitor with sinus tachycardia to 115 but mostly under 105. On exam, patient was non-toxic and comfortable appearing, abdomen was soft, tender only at incisions, nondistended, no rebound or guarding.    Plan:   - 500cc bolus  - EKG  - Closely monitor abdominal exam

## 2024-05-14 NOTE — DISCHARGE NOTE PROVIDER - NSDCMRMEDTOKEN_GEN_ALL_CORE_FT
levothyroxine 25 mcg (0.025 mg) oral tablet: 1 tab(s) orally once a day  Levsin SL 0.125 mg sublingual tablet: 1 tab(s) sublingually every 6 hours as needed for gastric spasm  omeprazole 40 mg oral delayed release capsule: 1 cap(s) orally once a day open capsule  ondansetron 4 mg oral tablet, disintegratin tab(s) orally every 6 hours as needed for  nausea MDD: 4  potassium citrate 15 mEq oral tablet, extended release: 2 tab(s) orally 3 times a day   Levsin SL 0.125 mg sublingual tablet: 1 tab(s) sublingually every 6 hours as needed for gastric spasm  omeprazole 40 mg oral delayed release capsule: 1 cap(s) orally once a day open capsule  ondansetron 4 mg oral tablet, disintegratin tab(s) orally every 6 hours as needed for  nausea MDD: 4

## 2024-05-14 NOTE — PATIENT PROFILE ADULT - FALL HARM RISK - HARM RISK INTERVENTIONS

## 2024-05-14 NOTE — DISCHARGE NOTE PROVIDER - NSDCCPTREATMENT_GEN_ALL_CORE_FT
PRINCIPAL PROCEDURE  Procedure: Robot-assisted sleeve gastrectomy  Findings and Treatment:       SECONDARY PROCEDURE  Procedure: Robot-assisted sleeve gastrectomy  Findings and Treatment:

## 2024-05-14 NOTE — PATIENT PROFILE ADULT - TRANSPORTATION
Pt is alert and cooperative with parent cartside. Discharge education and follow up recommendations reviewed. Parent verbalized understanding. Patient tolerated PO. Breathing easily at this time. All questions answered at this time. Okay to discharge per MD.   no

## 2024-05-14 NOTE — PATIENT PROFILE ADULT - PATIENT'S SEXUAL ORIENTATION
GEN: no fever, no chills, no pain  RESP: no SOB, no cough, no sputum  CVS: no chest pain, no palpitations, no edema  GI: + mild mid abdominal pain, mild nausea, + vomiting this morning , + diarrhea this morning   : no dysuria, no frequency, no hematuria  NEURO: no headache, no dizziness  PSYCH: no depression, not anxious  Derm : no itching, no rash
Heterosexual

## 2024-05-14 NOTE — PATIENT PROFILE ADULT - FALL HARM RISK - TYPE OF ASSESSMENT
What Is The Reason For Today's Visit?: Full Body Skin Examination What Is The Reason For Today's Visit? (Being Monitored For X): the development of new lesions Admission

## 2024-05-15 LAB
BASOPHILS # BLD AUTO: 0.03 K/UL — SIGNIFICANT CHANGE UP (ref 0–0.2)
BASOPHILS NFR BLD AUTO: 0.2 % — SIGNIFICANT CHANGE UP (ref 0–2)
EOSINOPHIL # BLD AUTO: 0 K/UL — SIGNIFICANT CHANGE UP (ref 0–0.5)
EOSINOPHIL NFR BLD AUTO: 0 % — SIGNIFICANT CHANGE UP (ref 0–6)
GLUCOSE BLDC GLUCOMTR-MCNC: 115 MG/DL — HIGH (ref 70–99)
GLUCOSE BLDC GLUCOMTR-MCNC: 119 MG/DL — HIGH (ref 70–99)
GLUCOSE BLDC GLUCOMTR-MCNC: 135 MG/DL — HIGH (ref 70–99)
HCT VFR BLD CALC: 26.3 % — LOW (ref 34.5–45)
HCT VFR BLD CALC: 26.6 % — LOW (ref 34.5–45)
HCT VFR BLD CALC: 26.9 % — LOW (ref 34.5–45)
HCT VFR BLD CALC: 29.4 % — LOW (ref 34.5–45)
HGB BLD-MCNC: 8.6 G/DL — LOW (ref 11.5–15.5)
HGB BLD-MCNC: 8.9 G/DL — LOW (ref 11.5–15.5)
HGB BLD-MCNC: 8.9 G/DL — LOW (ref 11.5–15.5)
HGB BLD-MCNC: 9.4 G/DL — LOW (ref 11.5–15.5)
HGB BLD-MCNC: 9.9 G/DL — LOW (ref 11.5–15.5)
IMM GRANULOCYTES NFR BLD AUTO: 1.1 % — HIGH (ref 0–0.9)
LYMPHOCYTES # BLD AUTO: 1.93 K/UL — SIGNIFICANT CHANGE UP (ref 1–3.3)
LYMPHOCYTES # BLD AUTO: 9.9 % — LOW (ref 13–44)
MCHC RBC-ENTMCNC: 30.7 PG — SIGNIFICANT CHANGE UP (ref 27–34)
MCHC RBC-ENTMCNC: 30.9 PG — SIGNIFICANT CHANGE UP (ref 27–34)
MCHC RBC-ENTMCNC: 31.3 PG — SIGNIFICANT CHANGE UP (ref 27–34)
MCHC RBC-ENTMCNC: 31.9 PG — SIGNIFICANT CHANGE UP (ref 27–34)
MCHC RBC-ENTMCNC: 32.7 GM/DL — SIGNIFICANT CHANGE UP (ref 32–36)
MCHC RBC-ENTMCNC: 33.5 GM/DL — SIGNIFICANT CHANGE UP (ref 32–36)
MCHC RBC-ENTMCNC: 33.7 GM/DL — SIGNIFICANT CHANGE UP (ref 32–36)
MCHC RBC-ENTMCNC: 34.9 GM/DL — SIGNIFICANT CHANGE UP (ref 32–36)
MCV RBC AUTO: 91.2 FL — SIGNIFICANT CHANGE UP (ref 80–100)
MCV RBC AUTO: 91.3 FL — SIGNIFICANT CHANGE UP (ref 80–100)
MCV RBC AUTO: 92.4 FL — SIGNIFICANT CHANGE UP (ref 80–100)
MCV RBC AUTO: 95.6 FL — SIGNIFICANT CHANGE UP (ref 80–100)
MONOCYTES # BLD AUTO: 1.29 K/UL — HIGH (ref 0–0.9)
MONOCYTES NFR BLD AUTO: 6.6 % — SIGNIFICANT CHANGE UP (ref 2–14)
NEUTROPHILS # BLD AUTO: 16.01 K/UL — HIGH (ref 1.8–7.4)
NEUTROPHILS NFR BLD AUTO: 82.2 % — HIGH (ref 43–77)
PLATELET # BLD AUTO: 290 K/UL — SIGNIFICANT CHANGE UP (ref 150–400)
PLATELET # BLD AUTO: 357 K/UL — SIGNIFICANT CHANGE UP (ref 150–400)
PLATELET # BLD AUTO: 360 K/UL — SIGNIFICANT CHANGE UP (ref 150–400)
PLATELET # BLD AUTO: 410 K/UL — HIGH (ref 150–400)
RBC # BLD: 2.75 M/UL — LOW (ref 3.8–5.2)
RBC # BLD: 2.88 M/UL — LOW (ref 3.8–5.2)
RBC # BLD: 2.95 M/UL — LOW (ref 3.8–5.2)
RBC # BLD: 3.22 M/UL — LOW (ref 3.8–5.2)
RBC # FLD: 12.7 % — SIGNIFICANT CHANGE UP (ref 10.3–14.5)
RBC # FLD: 12.9 % — SIGNIFICANT CHANGE UP (ref 10.3–14.5)
RBC # FLD: 13 % — SIGNIFICANT CHANGE UP (ref 10.3–14.5)
RBC # FLD: 13.1 % — SIGNIFICANT CHANGE UP (ref 10.3–14.5)
WBC # BLD: 18.82 K/UL — HIGH (ref 3.8–10.5)
WBC # BLD: 18.89 K/UL — HIGH (ref 3.8–10.5)
WBC # BLD: 19.16 K/UL — HIGH (ref 3.8–10.5)
WBC # BLD: 19.48 K/UL — HIGH (ref 3.8–10.5)
WBC # FLD AUTO: 18.82 K/UL — HIGH (ref 3.8–10.5)
WBC # FLD AUTO: 18.89 K/UL — HIGH (ref 3.8–10.5)
WBC # FLD AUTO: 19.16 K/UL — HIGH (ref 3.8–10.5)
WBC # FLD AUTO: 19.48 K/UL — HIGH (ref 3.8–10.5)

## 2024-05-15 RX ORDER — SODIUM CHLORIDE 9 MG/ML
1000 INJECTION, SOLUTION INTRAVENOUS ONCE
Refills: 0 | Status: COMPLETED | OUTPATIENT
Start: 2024-05-15 | End: 2024-05-15

## 2024-05-15 RX ADMIN — Medication 1000 MILLIGRAM(S): at 02:00

## 2024-05-15 RX ADMIN — Medication 1000 MILLIGRAM(S): at 09:00

## 2024-05-15 RX ADMIN — Medication 0.12 MILLIGRAM(S): at 10:55

## 2024-05-15 RX ADMIN — ONDANSETRON 4 MILLIGRAM(S): 8 TABLET, FILM COATED ORAL at 05:46

## 2024-05-15 RX ADMIN — PANTOPRAZOLE SODIUM 40 MILLIGRAM(S): 20 TABLET, DELAYED RELEASE ORAL at 10:50

## 2024-05-15 RX ADMIN — ONDANSETRON 4 MILLIGRAM(S): 8 TABLET, FILM COATED ORAL at 18:11

## 2024-05-15 RX ADMIN — ONDANSETRON 4 MILLIGRAM(S): 8 TABLET, FILM COATED ORAL at 10:51

## 2024-05-15 RX ADMIN — Medication 15 MILLIGRAM(S): at 10:46

## 2024-05-15 RX ADMIN — Medication 15 MILLIGRAM(S): at 05:46

## 2024-05-15 RX ADMIN — Medication 15 MILLIGRAM(S): at 06:00

## 2024-05-15 RX ADMIN — SODIUM CHLORIDE 1 GRAM(S): 9 INJECTION INTRAMUSCULAR; INTRAVENOUS; SUBCUTANEOUS at 10:47

## 2024-05-15 RX ADMIN — SODIUM CHLORIDE 125 MILLILITER(S): 9 INJECTION, SOLUTION INTRAVENOUS at 07:59

## 2024-05-15 RX ADMIN — Medication 15 MILLIGRAM(S): at 13:00

## 2024-05-15 RX ADMIN — Medication 15 MILLIGRAM(S): at 11:46

## 2024-05-15 RX ADMIN — HEPARIN SODIUM 7500 UNIT(S): 5000 INJECTION INTRAVENOUS; SUBCUTANEOUS at 05:46

## 2024-05-15 RX ADMIN — Medication 400 MILLIGRAM(S): at 07:59

## 2024-05-15 RX ADMIN — Medication 25 MICROGRAM(S): at 05:45

## 2024-05-15 RX ADMIN — Medication 15 MILLIGRAM(S): at 14:11

## 2024-05-15 RX ADMIN — Medication 400 MILLIGRAM(S): at 01:49

## 2024-05-15 RX ADMIN — SODIUM CHLORIDE 1000 MILLILITER(S): 9 INJECTION, SOLUTION INTRAVENOUS at 06:41

## 2024-05-15 RX ADMIN — ONDANSETRON 4 MILLIGRAM(S): 8 TABLET, FILM COATED ORAL at 00:37

## 2024-05-15 NOTE — CHART NOTE - NSCHARTNOTEFT_GEN_A_CORE
Bariatric Nutrition Note:    Diet: Diet, Clear Liquid:   Bariatric Clear Liquid (BARICLLIQ)     Special Instructions for Nursing:  Bariatric Clear Liquid,  start 6 hours postop if no nausea vomiting (05-14-24 @ 11:19)  Diet, NPO:   Except Medications  With Ice Chips/Sips of Water     Special Instructions for Nursing:  Except Medications, with Ice Chips/Sips of Water (05-14-24 @ 11:19)      PO intake/tolerance: Good    Education: Provided pt with verbal/written literature on bariatric clear liquid diet (POD 1-2) and bariatric full liquid diet (POD 3-14). Pt demonstrates good understanding. Pt to follow up with outpatient RD after discharge.

## 2024-05-15 NOTE — PHARMACOTHERAPY INTERVENTION NOTE - COMMENTS
Spoke with patient via  services  Reinforced discussion points from previous counseling. Informed patient of new prescriptions sent to pharmacy.

## 2024-05-15 NOTE — PHARMACOTHERAPY INTERVENTION NOTE - NSPHARMCOMMPTEDU
Patient Education - Other Implemented All Universal Safety Interventions:  [place] to call system. Call bell, personal items and telephone within reach. Instruct patient to call for assistance. Room bathroom lighting operational. Non-slip footwear when patient is off stretcher. Physically safe environment: no spills, clutter or unnecessary equipment. Stretcher in lowest position, wheels locked, appropriate side rails in place.

## 2024-05-16 LAB
GLUCOSE BLDC GLUCOMTR-MCNC: 113 MG/DL — HIGH (ref 70–99)
GLUCOSE BLDC GLUCOMTR-MCNC: 73 MG/DL — SIGNIFICANT CHANGE UP (ref 70–99)
GLUCOSE BLDC GLUCOMTR-MCNC: 89 MG/DL — SIGNIFICANT CHANGE UP (ref 70–99)
GLUCOSE BLDC GLUCOMTR-MCNC: 97 MG/DL — SIGNIFICANT CHANGE UP (ref 70–99)
HCT VFR BLD CALC: 24.8 % — LOW (ref 34.5–45)
HCT VFR BLD CALC: 25.7 % — LOW (ref 34.5–45)
HCT VFR BLD CALC: 31.5 % — LOW (ref 34.5–45)
HGB BLD-MCNC: 10.5 G/DL — LOW (ref 11.5–15.5)
HGB BLD-MCNC: 8.3 G/DL — LOW (ref 11.5–15.5)
HGB BLD-MCNC: 8.8 G/DL — LOW (ref 11.5–15.5)
MCHC RBC-ENTMCNC: 30.7 PG — SIGNIFICANT CHANGE UP (ref 27–34)
MCHC RBC-ENTMCNC: 31.1 PG — SIGNIFICANT CHANGE UP (ref 27–34)
MCHC RBC-ENTMCNC: 31.2 PG — SIGNIFICANT CHANGE UP (ref 27–34)
MCHC RBC-ENTMCNC: 33.3 GM/DL — SIGNIFICANT CHANGE UP (ref 32–36)
MCHC RBC-ENTMCNC: 33.5 GM/DL — SIGNIFICANT CHANGE UP (ref 32–36)
MCHC RBC-ENTMCNC: 34.2 GM/DL — SIGNIFICANT CHANGE UP (ref 32–36)
MCV RBC AUTO: 91.1 FL — SIGNIFICANT CHANGE UP (ref 80–100)
MCV RBC AUTO: 91.9 FL — SIGNIFICANT CHANGE UP (ref 80–100)
MCV RBC AUTO: 93.2 FL — SIGNIFICANT CHANGE UP (ref 80–100)
PLATELET # BLD AUTO: 271 K/UL — SIGNIFICANT CHANGE UP (ref 150–400)
PLATELET # BLD AUTO: 275 K/UL — SIGNIFICANT CHANGE UP (ref 150–400)
PLATELET # BLD AUTO: 315 K/UL — SIGNIFICANT CHANGE UP (ref 150–400)
RBC # BLD: 2.7 M/UL — LOW (ref 3.8–5.2)
RBC # BLD: 2.82 M/UL — LOW (ref 3.8–5.2)
RBC # BLD: 3.38 M/UL — LOW (ref 3.8–5.2)
RBC # FLD: 13.4 % — SIGNIFICANT CHANGE UP (ref 10.3–14.5)
RBC # FLD: 13.5 % — SIGNIFICANT CHANGE UP (ref 10.3–14.5)
RBC # FLD: 14.2 % — SIGNIFICANT CHANGE UP (ref 10.3–14.5)
SURGICAL PATHOLOGY STUDY: SIGNIFICANT CHANGE UP
WBC # BLD: 14.38 K/UL — HIGH (ref 3.8–10.5)
WBC # BLD: 14.59 K/UL — HIGH (ref 3.8–10.5)
WBC # BLD: 14.81 K/UL — HIGH (ref 3.8–10.5)
WBC # FLD AUTO: 14.38 K/UL — HIGH (ref 3.8–10.5)
WBC # FLD AUTO: 14.59 K/UL — HIGH (ref 3.8–10.5)
WBC # FLD AUTO: 14.81 K/UL — HIGH (ref 3.8–10.5)

## 2024-05-16 RX ADMIN — SODIUM CHLORIDE 125 MILLILITER(S): 9 INJECTION, SOLUTION INTRAVENOUS at 22:16

## 2024-05-16 RX ADMIN — ONDANSETRON 4 MILLIGRAM(S): 8 TABLET, FILM COATED ORAL at 00:46

## 2024-05-16 RX ADMIN — Medication 25 MICROGRAM(S): at 06:07

## 2024-05-16 RX ADMIN — ONDANSETRON 4 MILLIGRAM(S): 8 TABLET, FILM COATED ORAL at 06:07

## 2024-05-16 RX ADMIN — PANTOPRAZOLE SODIUM 40 MILLIGRAM(S): 20 TABLET, DELAYED RELEASE ORAL at 13:12

## 2024-05-16 RX ADMIN — ONDANSETRON 4 MILLIGRAM(S): 8 TABLET, FILM COATED ORAL at 13:11

## 2024-05-17 ENCOUNTER — TRANSCRIPTION ENCOUNTER (OUTPATIENT)
Age: 39
End: 2024-05-17

## 2024-05-17 VITALS
SYSTOLIC BLOOD PRESSURE: 128 MMHG | DIASTOLIC BLOOD PRESSURE: 86 MMHG | RESPIRATION RATE: 14 BRPM | HEART RATE: 81 BPM | TEMPERATURE: 99 F | OXYGEN SATURATION: 96 %

## 2024-05-17 LAB
ANION GAP SERPL CALC-SCNC: 11 MMOL/L — SIGNIFICANT CHANGE UP (ref 5–17)
BASOPHILS # BLD AUTO: 0.06 K/UL — SIGNIFICANT CHANGE UP (ref 0–0.2)
BASOPHILS NFR BLD AUTO: 0.5 % — SIGNIFICANT CHANGE UP (ref 0–2)
BUN SERPL-MCNC: 12.6 MG/DL — SIGNIFICANT CHANGE UP (ref 8–20)
CALCIUM SERPL-MCNC: 8.2 MG/DL — LOW (ref 8.4–10.5)
CHLORIDE SERPL-SCNC: 106 MMOL/L — SIGNIFICANT CHANGE UP (ref 96–108)
CO2 SERPL-SCNC: 22 MMOL/L — SIGNIFICANT CHANGE UP (ref 22–29)
CREAT SERPL-MCNC: 0.77 MG/DL — SIGNIFICANT CHANGE UP (ref 0.5–1.3)
EGFR: 100 ML/MIN/1.73M2 — SIGNIFICANT CHANGE UP
EOSINOPHIL # BLD AUTO: 0.25 K/UL — SIGNIFICANT CHANGE UP (ref 0–0.5)
EOSINOPHIL NFR BLD AUTO: 2.1 % — SIGNIFICANT CHANGE UP (ref 0–6)
GLUCOSE BLDC GLUCOMTR-MCNC: 85 MG/DL — SIGNIFICANT CHANGE UP (ref 70–99)
GLUCOSE BLDC GLUCOMTR-MCNC: 89 MG/DL — SIGNIFICANT CHANGE UP (ref 70–99)
GLUCOSE SERPL-MCNC: 83 MG/DL — SIGNIFICANT CHANGE UP (ref 70–99)
HCT VFR BLD CALC: 27.2 % — LOW (ref 34.5–45)
HGB BLD-MCNC: 9.1 G/DL — LOW (ref 11.5–15.5)
IMM GRANULOCYTES NFR BLD AUTO: 1.1 % — HIGH (ref 0–0.9)
LYMPHOCYTES # BLD AUTO: 28.8 % — SIGNIFICANT CHANGE UP (ref 13–44)
LYMPHOCYTES # BLD AUTO: 3.35 K/UL — HIGH (ref 1–3.3)
MAGNESIUM SERPL-MCNC: 1.9 MG/DL — SIGNIFICANT CHANGE UP (ref 1.6–2.6)
MCHC RBC-ENTMCNC: 30.4 PG — SIGNIFICANT CHANGE UP (ref 27–34)
MCHC RBC-ENTMCNC: 33.5 GM/DL — SIGNIFICANT CHANGE UP (ref 32–36)
MCV RBC AUTO: 91 FL — SIGNIFICANT CHANGE UP (ref 80–100)
MONOCYTES # BLD AUTO: 0.71 K/UL — SIGNIFICANT CHANGE UP (ref 0–0.9)
MONOCYTES NFR BLD AUTO: 6.1 % — SIGNIFICANT CHANGE UP (ref 2–14)
NEUTROPHILS # BLD AUTO: 7.13 K/UL — SIGNIFICANT CHANGE UP (ref 1.8–7.4)
NEUTROPHILS NFR BLD AUTO: 61.4 % — SIGNIFICANT CHANGE UP (ref 43–77)
PHOSPHATE SERPL-MCNC: 2.9 MG/DL — SIGNIFICANT CHANGE UP (ref 2.4–4.7)
PLATELET # BLD AUTO: 274 K/UL — SIGNIFICANT CHANGE UP (ref 150–400)
POTASSIUM SERPL-MCNC: 3.5 MMOL/L — SIGNIFICANT CHANGE UP (ref 3.5–5.3)
POTASSIUM SERPL-SCNC: 3.5 MMOL/L — SIGNIFICANT CHANGE UP (ref 3.5–5.3)
RBC # BLD: 2.99 M/UL — LOW (ref 3.8–5.2)
RBC # FLD: 14.3 % — SIGNIFICANT CHANGE UP (ref 10.3–14.5)
SODIUM SERPL-SCNC: 139 MMOL/L — SIGNIFICANT CHANGE UP (ref 135–145)
WBC # BLD: 11.63 K/UL — HIGH (ref 3.8–10.5)
WBC # FLD AUTO: 11.63 K/UL — HIGH (ref 3.8–10.5)

## 2024-05-17 PROCEDURE — 36430 TRANSFUSION BLD/BLD COMPNT: CPT

## 2024-05-17 PROCEDURE — C9399: CPT

## 2024-05-17 PROCEDURE — 82962 GLUCOSE BLOOD TEST: CPT

## 2024-05-17 PROCEDURE — 36415 COLL VENOUS BLD VENIPUNCTURE: CPT

## 2024-05-17 PROCEDURE — 86900 BLOOD TYPING SEROLOGIC ABO: CPT

## 2024-05-17 PROCEDURE — 85025 COMPLETE CBC W/AUTO DIFF WBC: CPT

## 2024-05-17 PROCEDURE — 86901 BLOOD TYPING SEROLOGIC RH(D): CPT

## 2024-05-17 PROCEDURE — 84100 ASSAY OF PHOSPHORUS: CPT

## 2024-05-17 PROCEDURE — 85018 HEMOGLOBIN: CPT

## 2024-05-17 PROCEDURE — 83735 ASSAY OF MAGNESIUM: CPT

## 2024-05-17 PROCEDURE — 88307 TISSUE EXAM BY PATHOLOGIST: CPT

## 2024-05-17 PROCEDURE — 86850 RBC ANTIBODY SCREEN: CPT

## 2024-05-17 PROCEDURE — 93005 ELECTROCARDIOGRAM TRACING: CPT

## 2024-05-17 PROCEDURE — 85027 COMPLETE CBC AUTOMATED: CPT

## 2024-05-17 PROCEDURE — 80048 BASIC METABOLIC PNL TOTAL CA: CPT

## 2024-05-17 PROCEDURE — C1889: CPT

## 2024-05-17 PROCEDURE — 86923 COMPATIBILITY TEST ELECTRIC: CPT

## 2024-05-17 PROCEDURE — P9016: CPT

## 2024-05-17 PROCEDURE — S2900: CPT

## 2024-05-17 RX ORDER — SODIUM,POTASSIUM PHOSPHATES 278-250MG
1 POWDER IN PACKET (EA) ORAL ONCE
Refills: 0 | Status: COMPLETED | OUTPATIENT
Start: 2024-05-17 | End: 2024-05-17

## 2024-05-17 RX ORDER — MAGNESIUM SULFATE 500 MG/ML
1 VIAL (ML) INJECTION ONCE
Refills: 0 | Status: COMPLETED | OUTPATIENT
Start: 2024-05-17 | End: 2024-05-17

## 2024-05-17 RX ORDER — LEVOTHYROXINE SODIUM 125 MCG
1 TABLET ORAL
Refills: 0 | DISCHARGE

## 2024-05-17 RX ORDER — POTASSIUM CITRATE MONOHYDRATE 100 %
2 POWDER (GRAM) MISCELLANEOUS
Refills: 0 | DISCHARGE

## 2024-05-17 RX ORDER — POTASSIUM CHLORIDE 20 MEQ
20 PACKET (EA) ORAL
Refills: 0 | Status: COMPLETED | OUTPATIENT
Start: 2024-05-17 | End: 2024-05-17

## 2024-05-17 RX ADMIN — Medication 25 MICROGRAM(S): at 06:09

## 2024-05-17 RX ADMIN — Medication 100 GRAM(S): at 09:40

## 2024-05-17 RX ADMIN — Medication 1 PACKET(S): at 09:40

## 2024-05-17 RX ADMIN — ONDANSETRON 4 MILLIGRAM(S): 8 TABLET, FILM COATED ORAL at 11:43

## 2024-05-17 RX ADMIN — Medication 20 MILLIEQUIVALENT(S): at 11:43

## 2024-05-17 RX ADMIN — Medication 20 MILLIEQUIVALENT(S): at 09:40

## 2024-05-17 RX ADMIN — PANTOPRAZOLE SODIUM 40 MILLIGRAM(S): 20 TABLET, DELAYED RELEASE ORAL at 11:45

## 2024-05-17 NOTE — PROGRESS NOTE ADULT - SUBJECTIVE AND OBJECTIVE BOX
HPI/OVERNIGHT EVENTS: Patient seen and examined at bedside this AM. No overnight events. No complaints. Denies fever, chills, nausea, vomiting, chest pain, SOB, dizziness, abd pain or any other concerning symptoms.    Vital Signs Last 24 Hrs  T(C): 36.8 (17 May 2024 04:04), Max: 37.6 (16 May 2024 08:27)  T(F): 98.2 (17 May 2024 04:04), Max: 99.7 (16 May 2024 08:27)  HR: 82 (17 May 2024 04:04) (80 - 102)  BP: 107/69 (17 May 2024 04:04) (105/74 - 122/77)  BP(mean): --  RR: 18 (17 May 2024 04:04) (18 - 19)  SpO2: 97% (17 May 2024 04:04) (91% - 97%)    Parameters below as of 17 May 2024 04:04  Patient On (Oxygen Delivery Method): room air        I&O's Detail    16 May 2024 07:01  -  17 May 2024 07:00  --------------------------------------------------------  IN:    Lactated Ringers: 1625 mL  Total IN: 1625 mL    OUT:    Voided (mL): 1400 mL  Total OUT: 1400 mL    Total NET: 225 mL          Constitutional: patient resting comfortably in bed, in no acute distress  HEENT: EOMI, PERRLA, MMM.  Respiratory: Non labored breathing on RA  Cardiovascular: RRR  Gastrointestinal: Abdomen soft, mildly tender at port sites, non-distended, no rebound tenderness / guarding  Musculoskeletal: No joint pain, swelling or deformity; no limitation of movement  Vascular: Extremities warm and well perfused.     LABS:                        9.1    11.63 )-----------( 274      ( 17 May 2024 05:18 )             27.2     05-17    139  |  106  |  12.6  ----------------------------<  83  3.5   |  22.0  |  0.77    Ca    8.2<L>      17 May 2024 05:18  Phos  2.9     05-17  Mg     1.9     05-17        Urinalysis Basic - ( 17 May 2024 05:18 )    Color: x / Appearance: x / SG: x / pH: x  Gluc: 83 mg/dL / Ketone: x  / Bili: x / Urobili: x   Blood: x / Protein: x / Nitrite: x   Leuk Esterase: x / RBC: x / WBC x   Sq Epi: x / Non Sq Epi: x / Bacteria: x        MEDICATIONS  (STANDING):  dextrose 10% Bolus 125 milliLiter(s) IV Bolus once  dextrose 5%. 1000 milliLiter(s) (50 mL/Hr) IV Continuous <Continuous>  dextrose 5%. 1000 milliLiter(s) (100 mL/Hr) IV Continuous <Continuous>  dextrose 50% Injectable 25 Gram(s) IV Push once  dextrose 50% Injectable 12.5 Gram(s) IV Push once  glucagon  Injectable 1 milliGRAM(s) IntraMuscular once  insulin lispro (ADMELOG) corrective regimen sliding scale   SubCutaneous three times a day before meals  lactated ringers. 1000 milliLiter(s) (125 mL/Hr) IV Continuous <Continuous>  lactated ringers. 1000 milliLiter(s) (100 mL/Hr) IV Continuous <Continuous>  levothyroxine 25 MICROGram(s) Oral daily  ondansetron Injectable 4 milliGRAM(s) IV Push every 6 hours  pantoprazole  Injectable 40 milliGRAM(s) IV Push every 24 hours  sodium chloride 0.9% 1000 milliLiter(s) (250 mL/Hr) IV Continuous <Continuous>    MEDICATIONS  (PRN):  dextrose Oral Gel 15 Gram(s) Oral once PRN Blood Glucose LESS THAN 70 milliGRAM(s)/deciliter  hyoscyamine SL 0.125 milliGRAM(s) SubLingual every 4 hours PRN Gastric Spasms  ketorolac   Injectable 15 milliGRAM(s) IV Push every 6 hours PRN breakthrough  LORazepam   Injectable 0.5 milliGRAM(s) IV Push once PRN Esophageal Spasm  metoclopramide Injectable 10 milliGRAM(s) IV Push every 6 hours PRN nausea refractory to zofran      MICRO:   Cultures     STUDIES:   EKG, CXR, U/S, CT, MRI   
Subjective:  Pt admits to abdominal discomfort since surgery but states she is overall feeling better than she was yesterday. Tolerating oral intake without n/v    STATUS POST:  RA sleeve gastrectomy    POST OPERATIVE DAY #: 2    MEDICATIONS  (STANDING):  dextrose 10% Bolus 125 milliLiter(s) IV Bolus once  dextrose 5%. 1000 milliLiter(s) (50 mL/Hr) IV Continuous <Continuous>  dextrose 5%. 1000 milliLiter(s) (100 mL/Hr) IV Continuous <Continuous>  dextrose 50% Injectable 25 Gram(s) IV Push once  dextrose 50% Injectable 12.5 Gram(s) IV Push once  glucagon  Injectable 1 milliGRAM(s) IntraMuscular once  insulin lispro (ADMELOG) corrective regimen sliding scale   SubCutaneous three times a day before meals  lactated ringers. 1000 milliLiter(s) (125 mL/Hr) IV Continuous <Continuous>  lactated ringers. 1000 milliLiter(s) (100 mL/Hr) IV Continuous <Continuous>  levothyroxine 25 MICROGram(s) Oral daily  ondansetron Injectable 4 milliGRAM(s) IV Push every 6 hours  pantoprazole  Injectable 40 milliGRAM(s) IV Push every 24 hours  sodium chloride 0.9% 1000 milliLiter(s) (250 mL/Hr) IV Continuous <Continuous>    MEDICATIONS  (PRN):  dextrose Oral Gel 15 Gram(s) Oral once PRN Blood Glucose LESS THAN 70 milliGRAM(s)/deciliter  hyoscyamine SL 0.125 milliGRAM(s) SubLingual every 4 hours PRN Gastric Spasms  ketorolac   Injectable 15 milliGRAM(s) IV Push every 6 hours PRN breakthrough  LORazepam   Injectable 0.5 milliGRAM(s) IV Push once PRN Esophageal Spasm  metoclopramide Injectable 10 milliGRAM(s) IV Push every 6 hours PRN nausea refractory to zofran      Vital Signs Last 24 Hrs  T(C): 37.1 (16 May 2024 05:15), Max: 37.3 (16 May 2024 02:11)  T(F): 98.8 (16 May 2024 05:15), Max: 99.2 (16 May 2024 02:11)  HR: 111 (16 May 2024 05:15) (94 - 121)  BP: 125/78 (16 May 2024 05:15) (118/67 - 137/77)  BP(mean): --  RR: 20 (16 May 2024 05:15) (17 - 20)  SpO2: 94% (16 May 2024 05:15) (92% - 98%)    Parameters below as of 16 May 2024 05:15  Patient On (Oxygen Delivery Method): room air        Physical Exam:    Constitutional: NAD  HEENT: PERRL, EOMI  Neck: No JVD, FROM without pain  Respiratory: Respirations non-labored, no accessory muscle use  Gastrointestinal: Soft, TTP LUQ and right side of abdomen, no peritoneal signs  Skin: Right sided port site with surrounding ecchymosis    LABS:                        8.8    14.81 )-----------( 271      ( 16 May 2024 05:56 )             25.7     05-14    133<L>  |  99  |  14.3  ----------------------------<  183<H>  4.2   |  18.0<L>  |  0.73    Ca    8.6      14 May 2024 11:28        Urinalysis Basic - ( 14 May 2024 11:28 )    Color: x / Appearance: x / SG: x / pH: x  Gluc: 183 mg/dL / Ketone: x  / Bili: x / Urobili: x   Blood: x / Protein: x / Nitrite: x   Leuk Esterase: x / RBC: x / WBC x   Sq Epi: x / Non Sq Epi: x / Bacteria: x        A: 38F s/p RA sleeve gastrectomy. Post op course complicated by acute blood loss anemia. Thought to be a staple line bleed. Pt has been transfused 2 units of blood during this hospital course. Inappropriate response to most recent transfusion and continued tachycardia suggests lack of hemostasis    Plan:   -Pt made NPO in case the decision is for OR today  -Will discuss with attending  -Likely repeat blood work later today  -IVF while NPO  -IS  -OOB if feeling well and stable on her feet
Subjective: Patient was seen and examined at bedside. Overnight pt was tachycardic to 105 got a 500ml bolus HR went down to 102 bur remained afebrile. This morning patient has orthostatic hypotension, tachycardia to 110s-120s and c/o of dizziness. Pt was given 1 L bolus.      MEDICATIONS  (STANDING):  acetaminophen   IVPB .. 1000 milliGRAM(s) IV Intermittent every 6 hours  dextrose 10% Bolus 125 milliLiter(s) IV Bolus once  dextrose 5%. 1000 milliLiter(s) (50 mL/Hr) IV Continuous <Continuous>  dextrose 5%. 1000 milliLiter(s) (100 mL/Hr) IV Continuous <Continuous>  dextrose 50% Injectable 25 Gram(s) IV Push once  dextrose 50% Injectable 12.5 Gram(s) IV Push once  glucagon  Injectable 1 milliGRAM(s) IntraMuscular once  heparin   Injectable 7500 Unit(s) SubCutaneous every 8 hours  insulin lispro (ADMELOG) corrective regimen sliding scale   SubCutaneous three times a day before meals  ketorolac   Injectable 15 milliGRAM(s) IV Push every 6 hours  lactated ringers. 1000 milliLiter(s) (125 mL/Hr) IV Continuous <Continuous>  lactated ringers. 1000 milliLiter(s) (100 mL/Hr) IV Continuous <Continuous>  levothyroxine 25 MICROGram(s) Oral daily  ondansetron Injectable 4 milliGRAM(s) IV Push every 6 hours  pantoprazole  Injectable 40 milliGRAM(s) IV Push every 24 hours  sodium chloride 1 Gram(s) Oral once  sodium chloride 0.9% 1000 milliLiter(s) (250 mL/Hr) IV Continuous <Continuous>    MEDICATIONS  (PRN):  dextrose Oral Gel 15 Gram(s) Oral once PRN Blood Glucose LESS THAN 70 milliGRAM(s)/deciliter  hyoscyamine SL 0.125 milliGRAM(s) SubLingual every 4 hours PRN Gastric Spasms  ketorolac   Injectable 15 milliGRAM(s) IV Push every 6 hours PRN breakthrough  LORazepam   Injectable 0.5 milliGRAM(s) IV Push once PRN Esophageal Spasm  metoclopramide Injectable 10 milliGRAM(s) IV Push every 6 hours PRN nausea refractory to zofran      Vital Signs Last 24 Hrs  T(C): 37 (15 May 2024 04:25), Max: 37.3 (14 May 2024 20:37)  T(F): 98.6 (15 May 2024 04:25), Max: 99.2 (14 May 2024 20:37)  HR: 102 (15 May 2024 04:25) (72 - 116)  BP: 117/74 (15 May 2024 06:22) (99/65 - 161/107)  BP(mean): 112 (14 May 2024 14:00) (112 - 126)  RR: 18 (15 May 2024 04:25) (18 - 28)  SpO2: 93% (15 May 2024 04:25) (92% - 100%)    Parameters below as of 15 May 2024 04:25  Patient On (Oxygen Delivery Method): room air        Physical Exam:    Constitutional: NAD  HEENT: PERRL, EOMI  Neck: No JVD, FROM without pain  Respiratory: Respirations non-labored, no accessory muscle use  Gastrointestinal: Soft, non-tender, non-distended, incisions-c/d/i         LABS:                        9.9    19.48 )-----------( 410      ( 15 May 2024 05:35 )             29.4     05-14    133<L>  |  99  |  14.3  ----------------------------<  183<H>  4.2   |  18.0<L>  |  0.73    Ca    8.6      14 May 2024 11:28        Urinalysis Basic - ( 14 May 2024 11:28 )    Color: x / Appearance: x / SG: x / pH: x  Gluc: 183 mg/dL / Ketone: x  / Bili: x / Urobili: x   Blood: x / Protein: x / Nitrite: x   Leuk Esterase: x / RBC: x / WBC x   Sq Epi: x / Non Sq Epi: x / Bacteria: x        Assessment: 37 yo F POD#1 s/p RA sleeve gastrectomy. Patient has been tachycardic with orthostatic hypotension, Hgb down to 9.9 from 15.6 from 14.9 pre-op.     Plan:   - monitor vitals closely  - repeat CBC at 10 am   - cont Bob protocol   
Patient feels well overall, denies pain, nausea, reflux.  She has been voiding without difficulty. She has been ambulating frequently and denies dizziness or lightheadedness.  She denies chest pain or shortness of breath.

## 2024-05-17 NOTE — DISCHARGE NOTE NURSING/CASE MANAGEMENT/SOCIAL WORK - PATIENT PORTAL LINK FT
You can access the FollowMyHealth Patient Portal offered by [facility] by registering at the following website: [email or web address]. By joining [facility]’s FollowMyHealth portal, you will also be able to view your health information using other applications (apps) compatible with our system.

## 2024-05-17 NOTE — PROGRESS NOTE ADULT - REASON FOR ADMISSION
s/p robotic assisted sleeve gastrectomy
Robot-assisted sleeve gastrectomy
s/p robotic-assisted sleeve gastrectomy
s/p robotic-assisted sleeve gastrectomy

## 2024-05-17 NOTE — PROGRESS NOTE ADULT - ASSESSMENT
Mrs Santana is a 37 yo F POD#2 s/p RA sleeve gastrectomy.  Post operatively she had been having tachycardia and downtrending Hgb requiring a total of 3U of pRBCs.  Patient states she has been ambulating without difficulties, is passing flatus and has no pain at this moment, overall patient looks much better than yesterday, abdomen is soft and non tender and feels much better.    Plan:   - Patient is stable and can be discharged home today with outpatient follow up

## 2024-05-17 NOTE — PROGRESS NOTE ADULT - NS ATTEND AMEND GEN_ALL_CORE FT
Patient seen and examined by me, and discussed with the surgical team.       Objective:  Appears comfortable, in no distress, and is sitting up in bed.    HR 70-80s regular.    Abdomen soft, non-tender, non-distended.  Dressings intact with some sanguineous staining, but otherwise clean and dry.  Incisions without erythema.    Lungs clear and respiratory effort non-labored.  Proper use of incentive spirometer confirmed.      Assessment/Plan:  Patient doing well POD # 3 s/p sleeve gastrectomy.  Patient has no nausea/emesis, vitals are stable and normal, pain is well controlled. She has responded appropriately to transfusions and is hemodynamically normal and not symptomatic.      Patient may continue with bariatric clear liquids as per protocol since patient is without nausea and is hemodynamically stable.      If able to tolerate clear liquids, remains clinically stable, and has no pain, patient may be discharged to home.    Post-op manual previously given to and reviewed with patient, including post-operative diet/progression, activity, wound care, precautions and follow-up instructions.      All of patient's questions and concerns addressed to patient's satisfaction and patient verbalized an understanding of the information discussed.
Patient seen and examined by me, and discussed with the surgical team.  Progress over last 24 hours reviewed. Patient received 2 units PRBC's yesterday.     Subjective:  Patient reports no abdominal pain this morning.  Patient has no nausea or emesis, and pain is well-controlled. Patient has been able to tolerate sips of clear liquids as per protocol.  Patient denies chest pain, shortness of breath and has been able to ambulate and void without difficulty.  She denies any dizziness or fatigue.  She states that overall she feels better than yesterday.       Objective:  Appears comfortable, in no distress, and is sitting up in bed.    's regular.    Abdomen soft, non-tender, non-distended.  Dressings intact with some sanguineous staining, but otherwise clean and dry.  Incisions without erythema.    Lungs clear and respiratory effort non-labored.    Assessment/Plan:  Patient doing well POD # 2s/p sleeve gastrectomy.  Patient has no nausea/emesis, vitals are stable, tachy but normotensive, and pain is well controlled.     H&H appears to have stabilized since transfusion.  She is not symptomatic at this time, has been able to get out of bed and ambulate without dizziness or lightheadedness.      Will check another CBC mid-day.  As patient is overall stable, will hold on returning to OR for now, but I discussed at length with her that we will continue to monitor closely and make a final determination based on clinical status.     All of patient's questions and concerns addressed to patient's satisfaction and patient verbalized an understanding of the information discussed.
Patient seen and examined by me, and discussed with the surgical team.    Subjective:  Patient reports no abdominal pain this morning.  Patient has no nausea or emesis, and pain is well-controlled. Patient has been able to tolerate sips of clear liquids as per protocol.  Patient denies chest pain, shortness of breath and has been able to ambulate and void without difficulty.       Objective:  Appears comfortable, in no distress, and is sitting up in bed.    's regular.    Abdomen soft, non-tender, non-distended.  Dressings intact with some sanguineous staining, but otherwise clean and dry.  Incisions without erythema.    Lungs clear and respiratory effort non-labored.  Proper use of incentive spirometer confirmed.      Assessment/Plan:  Patient doing well POD # 1 s/p sleeve gastrectomy.  Patient has no nausea/emesis, vitals are stable and normal, pain is well controlled.     Patient may continue with bariatric clear liquids as per protocol since patient is without nausea and is hemodynamically stable.  Will recheck labs later this morning.     If able to tolerate clear liquids, remains clinically stable, and has no pain, patient may be discharged to home.    Post-op manual previously given to and reviewed with patient, including post-operative diet/progression, activity, wound care, precautions and follow-up instructions.      All of patient's questions and concerns addressed to patient's satisfaction and patient verbalized an understanding of the information discussed.

## 2024-05-20 ENCOUNTER — NON-APPOINTMENT (OUTPATIENT)
Age: 39
End: 2024-05-20

## 2024-05-21 ENCOUNTER — LABORATORY RESULT (OUTPATIENT)
Age: 39
End: 2024-05-21

## 2024-05-21 ENCOUNTER — APPOINTMENT (OUTPATIENT)
Dept: SURGERY | Facility: CLINIC | Age: 39
End: 2024-05-21

## 2024-05-21 VITALS
HEIGHT: 62 IN | RESPIRATION RATE: 16 BRPM | SYSTOLIC BLOOD PRESSURE: 116 MMHG | DIASTOLIC BLOOD PRESSURE: 75 MMHG | HEART RATE: 90 BPM | WEIGHT: 269 LBS | OXYGEN SATURATION: 97 % | BODY MASS INDEX: 49.5 KG/M2 | TEMPERATURE: 98.1 F

## 2024-05-21 DIAGNOSIS — Z98.84 BARIATRIC SURGERY STATUS: ICD-10-CM

## 2024-05-21 PROCEDURE — 99214 OFFICE O/P EST MOD 30 MIN: CPT

## 2024-05-21 NOTE — HISTORY OF PRESENT ILLNESS
[de-identified] :    Procedure Details: Robotic sleeve gastrectomy  Procedure Date: 5/14/24 Surgeon Name: Dr. Lynne     Post Operative: 1 weeks  Patient presents to office today for evaluation of post operative abdominal pain. Patient states at rest she has minimal discomfort, during mobilization pain is worse.     Diet: Tolerating phase 1  Activities: walking, exercise encouraged Taking adequate PO water and protein

## 2024-05-21 NOTE — PLAN
[FreeTextEntry1] : STAT labs ordered Tylenol/Motrin for pain relief Instructed patient to use pillow to brace abdomen and support with mobilization.    The patient understands and agrees that if she experiences changes in  skin eyes or urine becoming yellow, pain, fever/chills/nausea/emesis or other symptoms she will contact the office or present to the ED immediately. - Patient verbalized understanding.

## 2024-05-21 NOTE — PHYSICAL EXAM
[Normal] : affect appropriate [de-identified] : slight jaundice noted  [de-identified] : soft, nontender. no rebound or guarding.  incisions c/d/i

## 2024-05-22 LAB
ALBUMIN SERPL ELPH-MCNC: 4.5 G/DL
ALP BLD-CCNC: 89 U/L
ALT SERPL-CCNC: 40 U/L
ANION GAP SERPL CALC-SCNC: 17 MMOL/L
APPEARANCE: ABNORMAL
AST SERPL-CCNC: 28 U/L
BACTERIA: ABNORMAL /HPF
BILIRUB SERPL-MCNC: 1.7 MG/DL
BILIRUBIN URINE: NEGATIVE
BLOOD URINE: ABNORMAL
BUN SERPL-MCNC: 20 MG/DL
CALCIUM SERPL-MCNC: 9.2 MG/DL
CAST: 0 /LPF
CHLORIDE SERPL-SCNC: 101 MMOL/L
CO2 SERPL-SCNC: 20 MMOL/L
COLOR: NORMAL
CREAT SERPL-MCNC: 1.11 MG/DL
EGFR: 64 ML/MIN/1.73M2
EPITHELIAL CELLS: 25 /HPF
GLUCOSE QUALITATIVE U: NEGATIVE MG/DL
GLUCOSE SERPL-MCNC: 57 MG/DL
KETONES URINE: ABNORMAL MG/DL
LEUKOCYTE ESTERASE URINE: ABNORMAL
MICROSCOPIC-UA: NORMAL
NITRITE URINE: POSITIVE
PH URINE: 6.5
POTASSIUM SERPL-SCNC: 4.3 MMOL/L
PROT SERPL-MCNC: 7.3 G/DL
PROTEIN URINE: 30 MG/DL
RED BLOOD CELLS URINE: 1 /HPF
SODIUM SERPL-SCNC: 138 MMOL/L
SPECIFIC GRAVITY URINE: 1.02
UROBILINOGEN URINE: 1 MG/DL
WHITE BLOOD CELLS URINE: 16 /HPF

## 2024-05-29 ENCOUNTER — APPOINTMENT (OUTPATIENT)
Dept: SURGERY | Facility: CLINIC | Age: 39
End: 2024-05-29
Payer: COMMERCIAL

## 2024-05-29 VITALS
WEIGHT: 264.04 LBS | RESPIRATION RATE: 16 BRPM | OXYGEN SATURATION: 98 % | SYSTOLIC BLOOD PRESSURE: 110 MMHG | HEART RATE: 82 BPM | HEIGHT: 62 IN | TEMPERATURE: 98 F | DIASTOLIC BLOOD PRESSURE: 76 MMHG | BODY MASS INDEX: 48.59 KG/M2

## 2024-05-29 DIAGNOSIS — E66.01 MORBID (SEVERE) OBESITY DUE TO EXCESS CALORIES: ICD-10-CM

## 2024-05-29 PROCEDURE — 99024 POSTOP FOLLOW-UP VISIT: CPT

## 2024-05-29 NOTE — HISTORY OF PRESENT ILLNESS
[de-identified] : Patient is s/p sleeve gastrectomy.   Patient is doing well, denies pain at this point. Patient states there is no discomfort in the abdomen or at the surgical sites.  She states that the pain she was experiencing last week has resolved completely and she has had no recurrence of the pain.  Patient denies fever or chills, no chest pain or shortness of breath.  Patient is tolerating a liquid diet without difficulty, and denies reflux, nausea or emesis.  Patient reports rapid and prolonged satiety.  Patient denies constipation or diarrhea.  Patient is ambulating without difficulty.   Patient offers no complaints at this time and is very pleased with the post-op recovery. She is still taking the omeprazole, levsin and zofran.   [de-identified] : Patient reports no interval changes to medications, medical history or overall health status.

## 2024-05-29 NOTE — PLAN
[FreeTextEntry1] : Post-operative diet advancement,  wound care, activity, restrictions and precautions reinforced.  Patient instructed to refrain from any heavy lifting greater than 10-15  pounds for at least 4 weeks post-operatively.    Patient's questions and concerns addressed to patient's satisfaction.  I reviewed importance of behavioral modification and follow-up in order to optimize outcomes and avoid complications. Post-operative nutrition again reviewed and reinforced, including the importance of taking supplements, making healthy food choices.    The importance of maintaining regular exercise/physical activity to maximize progress also reinforced.   Recommend patient to see nutritionist today.   Patient's questions and concerns addressed to patient's satisfaction.   Patient will follow up with me in one month.

## 2024-05-29 NOTE — REVIEW OF SYSTEMS
[Fever] : no fever [Chills] : no chills [Fatigue] : no fatigue [Chest Pain] : no chest pain [Shortness Of Breath] : no shortness of breath [Abdominal Pain] : no abdominal pain [Vomiting] : no vomiting [Constipation] : no constipation [Reflux/Heartburn] : no reflux/ heartburn [Negative] : Heme/Lymph

## 2024-05-29 NOTE — ASSESSMENT
[FreeTextEntry1] : Patient with history of severe obesity s/p sleeve gastrectomy. Her abdominal pain from last week has resolved.   Patient  is doing well, with excellent post-operative recovery.  All surgical incisions are healing well and as expected.  There is no evidence of infection or complication, and patient is progressing as expected.    Patient is tolerating an appropriate post-operative and nutritionally balanced diet without difficulty.

## 2024-05-29 NOTE — PHYSICAL EXAM
[Obese, well nourished, in no acute distress] : obese, well nourished, in no acute distress [Normal] : affect appropriate [de-identified] : Soft, nontender, nondistended. No masses, rebound or guarding. Incisions well-approximated, without erythema or drainage.  No hernias palpable.  Resolving ecchymosis of right upper abdominal port site.

## 2024-06-06 ENCOUNTER — APPOINTMENT (OUTPATIENT)
Dept: ULTRASOUND IMAGING | Facility: CLINIC | Age: 39
End: 2024-06-06
Payer: COMMERCIAL

## 2024-06-06 ENCOUNTER — OUTPATIENT (OUTPATIENT)
Dept: OUTPATIENT SERVICES | Facility: HOSPITAL | Age: 39
LOS: 1 days | End: 2024-06-06

## 2024-06-06 DIAGNOSIS — Z98.890 OTHER SPECIFIED POSTPROCEDURAL STATES: Chronic | ICD-10-CM

## 2024-06-06 DIAGNOSIS — Z90.49 ACQUIRED ABSENCE OF OTHER SPECIFIED PARTS OF DIGESTIVE TRACT: Chronic | ICD-10-CM

## 2024-06-06 DIAGNOSIS — N20.0 CALCULUS OF KIDNEY: Chronic | ICD-10-CM

## 2024-06-06 DIAGNOSIS — N20.0 CALCULUS OF KIDNEY: ICD-10-CM

## 2024-06-06 PROCEDURE — 76770 US EXAM ABDO BACK WALL COMP: CPT | Mod: 26

## 2024-06-12 ENCOUNTER — APPOINTMENT (OUTPATIENT)
Dept: UROLOGY | Facility: CLINIC | Age: 39
End: 2024-06-12
Payer: COMMERCIAL

## 2024-06-12 DIAGNOSIS — N20.0 CALCULUS OF KIDNEY: ICD-10-CM

## 2024-06-12 PROCEDURE — G2211 COMPLEX E/M VISIT ADD ON: CPT | Mod: NC

## 2024-06-12 PROCEDURE — 99215 OFFICE O/P EST HI 40 MIN: CPT

## 2024-06-12 RX ORDER — ALLOPURINOL 100 MG/1
100 TABLET ORAL DAILY
Qty: 90 | Refills: 3 | Status: ACTIVE | COMMUNITY
Start: 2024-06-12 | End: 1900-01-01

## 2024-06-12 RX ORDER — CHLORTHALIDONE 25 MG/1
25 TABLET ORAL DAILY
Qty: 90 | Refills: 3 | Status: ACTIVE | COMMUNITY
Start: 2024-06-12 | End: 1900-01-01

## 2024-06-12 RX ORDER — POTASSIUM CITRATE 15 MEQ/1
15 MEQ TABLET, EXTENDED RELEASE ORAL
Qty: 360 | Refills: 3 | Status: ACTIVE | COMMUNITY
Start: 2023-10-25 | End: 1900-01-01

## 2024-06-12 NOTE — ASSESSMENT
[FreeTextEntry1] :  plan: - restart all medication - follow up in 2-3 weeks to review results - if there is no improvement will need to schedule consultation as discussed

## 2024-06-12 NOTE — HISTORY OF PRESENT ILLNESS
[FreeTextEntry1] : 39 yo F interview performed with [place] interpretation  on K-citrate, chlorthalidone, alluporinol, dietary changes  previous calcium and parathyroid levels normal (last tested 02/2024)  s/p left PCNL followed by ureteroscopy, no stent s/p right PCNL with stent stone: 80% Struvite (Magnesium Ammonium Phosphate), 20% Calcium Phosphate (Apatite)  previous 24 hr urine: very high calcium, high oxalate, high Na, high UUN CBC: 2/21/2024: HGB 14, WBC 7.75 BMP 2/21/2024: CRE 0.9. K 4.0 , NA normal  feeling well patient has not been taking the medication for 3 weeks reviewed new ultrasound 6/6/2024: no stones reviewed 24 hr from 01/2024: good volume, very high calcium and Na, low citrate, high UA and UUN send 24 hr on 6/6/2024 - still no report  discussed with the patient again explained the importance of the medication and dietary changes also recommended she consults with nephrology and dietitian if there are no changes for now the patient says she made many changes to her diet, and lost more than 30 lb, prefers to see the results of the new urine collection first  plan: - restart all medication - follow up in 2-3 weeks to review results - if there is no improvement will need to schedule consultation as discussed

## 2024-06-25 ENCOUNTER — APPOINTMENT (OUTPATIENT)
Dept: SURGERY | Facility: CLINIC | Age: 39
End: 2024-06-25

## 2024-06-25 VITALS
BODY MASS INDEX: 45.45 KG/M2 | HEART RATE: 65 BPM | DIASTOLIC BLOOD PRESSURE: 81 MMHG | OXYGEN SATURATION: 99 % | HEIGHT: 62 IN | RESPIRATION RATE: 16 BRPM | SYSTOLIC BLOOD PRESSURE: 115 MMHG | TEMPERATURE: 98.1 F | WEIGHT: 247 LBS

## 2024-06-25 PROCEDURE — 99214 OFFICE O/P EST MOD 30 MIN: CPT

## 2024-06-26 ENCOUNTER — APPOINTMENT (OUTPATIENT)
Dept: UROLOGY | Facility: CLINIC | Age: 39
End: 2024-06-26
Payer: COMMERCIAL

## 2024-06-26 DIAGNOSIS — E83.50 UNSPECIFIED DISORDER OF CALCIUM METABOLISM: ICD-10-CM

## 2024-06-26 PROCEDURE — 99214 OFFICE O/P EST MOD 30 MIN: CPT

## 2024-06-26 PROCEDURE — G2211 COMPLEX E/M VISIT ADD ON: CPT | Mod: NC

## 2024-07-22 ENCOUNTER — APPOINTMENT (OUTPATIENT)
Dept: ULTRASOUND IMAGING | Facility: CLINIC | Age: 39
End: 2024-07-22
Payer: COMMERCIAL

## 2024-07-22 PROCEDURE — 76770 US EXAM ABDO BACK WALL COMP: CPT | Mod: 26

## 2024-08-18 NOTE — ASU PREOP CHECKLIST - HAND OFF
S/p ORIF R acetabulum 8/16    - NWB RLE x 10 weeks  - Eliquis 2.5 BID x 10 weeks  - leave dressings dry and intact  - Post op ancef  - dc chong  - Psych consulted for delirium recs     Holding RN to OR RN

## 2024-08-21 ENCOUNTER — APPOINTMENT (OUTPATIENT)
Dept: SURGERY | Facility: CLINIC | Age: 39
End: 2024-08-21
Payer: COMMERCIAL

## 2024-08-21 VITALS
SYSTOLIC BLOOD PRESSURE: 120 MMHG | WEIGHT: 233.4 LBS | TEMPERATURE: 97 F | RESPIRATION RATE: 16 BRPM | OXYGEN SATURATION: 97 % | BODY MASS INDEX: 42.95 KG/M2 | HEART RATE: 66 BPM | DIASTOLIC BLOOD PRESSURE: 80 MMHG | HEIGHT: 62 IN

## 2024-08-21 DIAGNOSIS — E66.01 MORBID (SEVERE) OBESITY DUE TO EXCESS CALORIES: ICD-10-CM

## 2024-08-21 PROCEDURE — 99213 OFFICE O/P EST LOW 20 MIN: CPT

## 2024-08-21 NOTE — HISTORY OF PRESENT ILLNESS
[de-identified] : Patient is here for follow s/p sleeve gastrectomy.   Patient feels well overall and has no complaints at this time.   Patient is doing well with good interval progress and weight loss.  Patient is making good food choices, eating small portions and has rapid and prolonged satiety. Patient denies any complaints of fever, pain, diarrhea, heartburn, reflux, or vomiting. Normal bowel function. Normal urination. Compliant with supplements.  Patient is engaging in regular exercise.   Patient's medications, allergies, past medical, surgical, social and family histories were reviewed and updated as appropriate.    [de-identified] : Patient reports no interval changes to medications, medical history or overall health status.

## 2024-08-21 NOTE — PLAN
[FreeTextEntry1] : I reviewed importance of behavioral modification and follow-up in order to optimize outcomes and avoid complications. Post-operative nutrition again reviewed and reinforced, including the importance of taking supplements, making healthy food choices.   The importance of maintaining regular exercise/physical activity to maximize progress also reinforced.   Recommend patient to see nutritionist at the next visit.   Patient's questions and concerns addressed to patient's satisfaction.   She will return to see me in one month.

## 2024-08-21 NOTE — ASSESSMENT
[FreeTextEntry1] : Patient with history of severe obesity s/p sleeve gastrectomy .   Patient is doing well since the prior visit, with excellent interval and overall  progress.  Patient is tolerating an appropriate portion controlled and nutritionally balanced diet without difficulty.

## 2024-08-21 NOTE — PHYSICAL EXAM
[Normal] : affect appropriate [de-identified] : soft, nontender. no rebound or guarding.  incisions healed.

## 2024-09-18 ENCOUNTER — APPOINTMENT (OUTPATIENT)
Dept: SURGERY | Facility: CLINIC | Age: 39
End: 2024-09-18
Payer: COMMERCIAL

## 2024-09-18 VITALS
DIASTOLIC BLOOD PRESSURE: 79 MMHG | SYSTOLIC BLOOD PRESSURE: 116 MMHG | BODY MASS INDEX: 41.88 KG/M2 | OXYGEN SATURATION: 97 % | HEIGHT: 62 IN | HEART RATE: 73 BPM | WEIGHT: 227.6 LBS | TEMPERATURE: 99 F | RESPIRATION RATE: 16 BRPM

## 2024-09-18 DIAGNOSIS — E66.01 MORBID (SEVERE) OBESITY DUE TO EXCESS CALORIES: ICD-10-CM

## 2024-09-18 PROCEDURE — 99213 OFFICE O/P EST LOW 20 MIN: CPT

## 2024-09-18 NOTE — ASSESSMENT
[FreeTextEntry1] : Previous visit weight: 233.6 lbs Today's weight: 227 lbs Today's BMI: 41.6 Patient is doing well since the prior visit, with excellent interval and overall  progress.  Patient is tolerating an appropriate portion controlled and nutritionally balanced diet without difficulty.

## 2024-09-18 NOTE — PLAN
[FreeTextEntry1] : Recommend continue 1-to-1 sessions with registered dietitian   Maintain 64 oz water/fluid intake and >75g protein intake per day. Pt understands and agrees  Maintain activity level / exercise  Return to office in 3 months

## 2024-09-18 NOTE — HISTORY OF PRESENT ILLNESS
[de-identified] : Procedure Details: Robotic sleeve gastrectomy  Procedure Date: 5/14/24 Surgeon Name: Dr. Lynne     Post Operative: 4 months  Patient denies nausea, vomiting, diarrhea or reflux Reports occasional constipation, recommend resuming MiraLAX at bedtime.    Diet: Tolerating regular diet  Activities: walking, stair exercises at home 5x per week  Taking adequate PO water and protein - taking multivitamin   She feels well overall and is very pleased with her progress.  [de-identified] : Patient reports no interval changes to medications, medical history or overall health status.

## 2024-09-18 NOTE — PHYSICAL EXAM
[Normal] : affect appropriate [de-identified] : soft, nontender. no rebound or guarding.  incisions healed.

## 2024-09-18 NOTE — REVIEW OF SYSTEMS
[Negative] : Allergic/Immunologic [Abdominal Pain] : no abdominal pain [Vomiting] : no vomiting [Constipation] : constipation [Diarrhea] : no diarrhea [Reflux/Heartburn] : no reflux/ heartburn

## 2024-10-02 ENCOUNTER — APPOINTMENT (OUTPATIENT)
Dept: UROLOGY | Facility: CLINIC | Age: 39
End: 2024-10-02

## 2024-11-27 ENCOUNTER — APPOINTMENT (OUTPATIENT)
Dept: SURGERY | Facility: CLINIC | Age: 39
End: 2024-11-27
Payer: COMMERCIAL

## 2024-11-27 ENCOUNTER — NON-APPOINTMENT (OUTPATIENT)
Age: 39
End: 2024-11-27

## 2024-11-27 VITALS
SYSTOLIC BLOOD PRESSURE: 120 MMHG | OXYGEN SATURATION: 98 % | WEIGHT: 210.2 LBS | RESPIRATION RATE: 16 BRPM | HEART RATE: 71 BPM | HEIGHT: 62.5 IN | BODY MASS INDEX: 37.72 KG/M2 | TEMPERATURE: 97.8 F | DIASTOLIC BLOOD PRESSURE: 79 MMHG

## 2024-11-27 DIAGNOSIS — E66.01 MORBID (SEVERE) OBESITY DUE TO EXCESS CALORIES: ICD-10-CM

## 2024-11-27 DIAGNOSIS — Z98.84 BARIATRIC SURGERY STATUS: ICD-10-CM

## 2024-11-27 PROCEDURE — 99213 OFFICE O/P EST LOW 20 MIN: CPT

## 2024-12-13 ENCOUNTER — NON-APPOINTMENT (OUTPATIENT)
Age: 39
End: 2024-12-13

## 2025-02-26 ENCOUNTER — APPOINTMENT (OUTPATIENT)
Dept: SURGERY | Facility: CLINIC | Age: 40
End: 2025-02-26

## 2025-02-26 VITALS
BODY MASS INDEX: 34.99 KG/M2 | HEIGHT: 62.5 IN | WEIGHT: 195 LBS | RESPIRATION RATE: 16 BRPM | OXYGEN SATURATION: 99 % | TEMPERATURE: 97.6 F | SYSTOLIC BLOOD PRESSURE: 112 MMHG | DIASTOLIC BLOOD PRESSURE: 79 MMHG | HEART RATE: 70 BPM

## 2025-02-26 DIAGNOSIS — Z98.84 BARIATRIC SURGERY STATUS: ICD-10-CM

## 2025-02-26 PROCEDURE — 99213 OFFICE O/P EST LOW 20 MIN: CPT

## 2025-04-29 DIAGNOSIS — Z01.419 ENCOUNTER FOR GYNECOLOGICAL EXAMINATION (GENERAL) (ROUTINE) W/OUT ABNORMAL FINDINGS: ICD-10-CM

## 2025-04-30 ENCOUNTER — RESULT CHARGE (OUTPATIENT)
Age: 40
End: 2025-04-30

## 2025-04-30 ENCOUNTER — APPOINTMENT (OUTPATIENT)
Dept: OBGYN | Facility: CLINIC | Age: 40
End: 2025-04-30
Payer: COMMERCIAL

## 2025-04-30 VITALS
HEIGHT: 62 IN | BODY MASS INDEX: 37.17 KG/M2 | SYSTOLIC BLOOD PRESSURE: 122 MMHG | WEIGHT: 202 LBS | DIASTOLIC BLOOD PRESSURE: 88 MMHG

## 2025-04-30 DIAGNOSIS — N91.1 SECONDARY AMENORRHEA: ICD-10-CM

## 2025-04-30 LAB
APPEARANCE: CLEAR
BILIRUBIN URINE: NEGATIVE
BLOOD URINE: NEGATIVE
COLOR: YELLOW
GLUCOSE QUALITATIVE U: NEGATIVE
KETONES URINE: NEGATIVE
LEUKOCYTE ESTERASE URINE: ABNORMAL
NITRITE URINE: NEGATIVE
PH URINE: 6
PROTEIN URINE: NEGATIVE
SPECIFIC GRAVITY URINE: 1.02
UROBILINOGEN URINE: 0.2 (ref 0.2–?)

## 2025-04-30 PROCEDURE — 99459 PELVIC EXAMINATION: CPT

## 2025-04-30 PROCEDURE — 99204 OFFICE O/P NEW MOD 45 MIN: CPT | Mod: 25

## 2025-04-30 PROCEDURE — 99386 PREV VISIT NEW AGE 40-64: CPT

## 2025-04-30 RX ORDER — PNV NO.95/FERROUS FUM/FOLIC AC 28MG-0.8MG
28-0.8 TABLET ORAL DAILY
Qty: 90 | Refills: 3 | Status: ACTIVE | COMMUNITY
Start: 2025-04-30 | End: 1900-01-01

## 2025-05-01 LAB
C TRACH RRNA SPEC QL NAA+PROBE: NOT DETECTED
HPV HIGH+LOW RISK DNA PNL CVX: NOT DETECTED
N GONORRHOEA RRNA SPEC QL NAA+PROBE: NOT DETECTED
SOURCE TP AMPLIFICATION: NORMAL

## 2025-05-05 DIAGNOSIS — Z34.90 ENCOUNTER FOR SUPERVISION OF NORMAL PREGNANCY, UNSPECIFIED, UNSPECIFIED TRIMESTER: ICD-10-CM

## 2025-05-05 DIAGNOSIS — Z13.71 ENCOUNTER FOR NONPROCREATIVE SCREENING FOR GENETIC DISEASE CARRIER STATUS: ICD-10-CM

## 2025-05-05 LAB — CYTOLOGY CVX/VAG DOC THIN PREP: NORMAL

## 2025-05-09 ENCOUNTER — APPOINTMENT (OUTPATIENT)
Dept: ANTEPARTUM | Facility: CLINIC | Age: 40
End: 2025-05-09
Payer: COMMERCIAL

## 2025-05-09 ENCOUNTER — ASOB RESULT (OUTPATIENT)
Age: 40
End: 2025-05-09

## 2025-05-09 PROCEDURE — 76801 OB US < 14 WKS SINGLE FETUS: CPT

## 2025-05-12 ENCOUNTER — NON-APPOINTMENT (OUTPATIENT)
Age: 40
End: 2025-05-12

## 2025-05-13 ENCOUNTER — LABORATORY RESULT (OUTPATIENT)
Age: 40
End: 2025-05-13

## 2025-05-14 ENCOUNTER — APPOINTMENT (OUTPATIENT)
Dept: OBGYN | Facility: CLINIC | Age: 40
End: 2025-05-14
Payer: COMMERCIAL

## 2025-05-14 VITALS
BODY MASS INDEX: 37.63 KG/M2 | SYSTOLIC BLOOD PRESSURE: 120 MMHG | WEIGHT: 204.5 LBS | HEIGHT: 62 IN | DIASTOLIC BLOOD PRESSURE: 84 MMHG

## 2025-05-14 PROBLEM — Z3A.12 12 WEEKS GESTATION OF PREGNANCY: Status: ACTIVE | Noted: 2025-05-14

## 2025-05-14 LAB
APPEARANCE: CLEAR
BILIRUBIN URINE: NEGATIVE
BLOOD URINE: NEGATIVE
COLOR: YELLOW
GLUCOSE QUALITATIVE U: NEGATIVE
KETONES URINE: ABNORMAL
LEUKOCYTE ESTERASE URINE: ABNORMAL
NITRITE URINE: NEGATIVE
PH URINE: 7
PROTEIN URINE: NEGATIVE
SPECIFIC GRAVITY URINE: 1.02
UROBILINOGEN URINE: 1 (ref 0.2–?)

## 2025-05-14 PROCEDURE — 99213 OFFICE O/P EST LOW 20 MIN: CPT | Mod: TH

## 2025-05-15 LAB
ABORH: NORMAL
ALBUMIN SERPL ELPH-MCNC: 4.5 G/DL
ALP BLD-CCNC: 96 U/L
ALT SERPL-CCNC: 19 U/L
ANION GAP SERPL CALC-SCNC: 20 MMOL/L
ANTIBODY SCREEN: NORMAL
APPEARANCE: CLEAR
AST SERPL-CCNC: 19 U/L
BILIRUB SERPL-MCNC: 0.2 MG/DL
BILIRUBIN URINE: NEGATIVE
BLOOD URINE: NEGATIVE
BUN SERPL-MCNC: 11 MG/DL
CALCIUM SERPL-MCNC: 9.4 MG/DL
CHLORIDE SERPL-SCNC: 103 MMOL/L
CO2 SERPL-SCNC: 17 MMOL/L
COLOR: YELLOW
CREAT SERPL-MCNC: 0.72 MG/DL
EGFRCR SERPLBLD CKD-EPI 2021: 108 ML/MIN/1.73M2
ESTIMATED AVERAGE GLUCOSE: 100 MG/DL
GLUCOSE QUALITATIVE U: NEGATIVE MG/DL
GLUCOSE SERPL-MCNC: NORMAL MG/DL
HBA1C MFR BLD HPLC: 5.1 %
HCT VFR BLD CALC: 43.7 %
HGB A MFR BLD: 97 %
HGB A2 MFR BLD: 3 %
HGB BLD-MCNC: 13.8 G/DL
HGB FRACT BLD-IMP: NORMAL
KETONES URINE: ABNORMAL MG/DL
LEUKOCYTE ESTERASE URINE: ABNORMAL
MCHC RBC-ENTMCNC: 30.5 PG
MCHC RBC-ENTMCNC: 31.6 G/DL
MCV RBC AUTO: 96.7 FL
NITRITE URINE: NEGATIVE
PH URINE: 7
PLATELET # BLD AUTO: 384 K/UL
POTASSIUM SERPL-SCNC: 4.3 MMOL/L
PROT SERPL-MCNC: 7.2 G/DL
PROTEIN URINE: NORMAL MG/DL
RBC # BLD: 4.52 M/UL
RBC # FLD: 13.8 %
SODIUM SERPL-SCNC: 141 MMOL/L
SPECIFIC GRAVITY URINE: 1.02
TSH SERPL-ACNC: 2.41 UIU/ML
UROBILINOGEN URINE: 1 MG/DL
WBC # FLD AUTO: 15.68 K/UL

## 2025-05-16 LAB
B19V IGG SER QL IA: 3.79 INDEX
B19V IGG+IGM SER-IMP: NORMAL
B19V IGG+IGM SER-IMP: POSITIVE
B19V IGM FLD-ACNC: 0.17 INDEX
B19V IGM SER-ACNC: NEGATIVE
HBV SURFACE AG SER QL: NONREACTIVE
HCV AB SER QL: NONREACTIVE
HCV S/CO RATIO: 0.12 S/CO
HIV1+2 AB SPEC QL IA.RAPID: NONREACTIVE
LEAD BLD-MCNC: <1 UG/DL
MEV IGG FLD QL IA: >300 AU/ML
MEV IGG+IGM SER-IMP: POSITIVE
MUV AB SER-ACNC: POSITIVE
MUV IGG SER QL IA: 290 AU/ML
RUBV IGG FLD-ACNC: 4.89 INDEX
RUBV IGG SER-IMP: POSITIVE
T PALLIDUM AB SER QL IA: NEGATIVE
VZV AB TITR SER: POSITIVE
VZV IGG SER IF-ACNC: 13.9 S/CO

## 2025-05-17 LAB
M TB IFN-G BLD-IMP: NEGATIVE
QUANTIFERON TB PLUS MITOGEN MINUS NIL: >10 IU/ML
QUANTIFERON TB PLUS NIL: 0.05 IU/ML
QUANTIFERON TB PLUS TB1 MINUS NIL: 0.08 IU/ML
QUANTIFERON TB PLUS TB2 MINUS NIL: 0.12 IU/ML

## 2025-05-20 ENCOUNTER — NON-APPOINTMENT (OUTPATIENT)
Age: 40
End: 2025-05-20

## 2025-05-20 ENCOUNTER — APPOINTMENT (OUTPATIENT)
Dept: ANTEPARTUM | Facility: CLINIC | Age: 40
End: 2025-05-20
Payer: COMMERCIAL

## 2025-05-20 ENCOUNTER — ASOB RESULT (OUTPATIENT)
Age: 40
End: 2025-05-20

## 2025-05-20 DIAGNOSIS — Z34.92 ENCOUNTER FOR SUPERVISION OF NORMAL PREGNANCY, UNSPECIFIED, SECOND TRIMESTER: ICD-10-CM

## 2025-05-20 PROCEDURE — 76813 OB US NUCHAL MEAS 1 GEST: CPT

## 2025-05-20 RX ORDER — ASPIRIN 81 MG/1
81 TABLET, DELAYED RELEASE ORAL DAILY
Qty: 30 | Refills: 2 | Status: ACTIVE | COMMUNITY
Start: 2025-05-20 | End: 1900-01-01

## 2025-05-22 ENCOUNTER — ASOB RESULT (OUTPATIENT)
Age: 40
End: 2025-05-22

## 2025-05-22 ENCOUNTER — APPOINTMENT (OUTPATIENT)
Dept: MATERNAL FETAL MEDICINE | Facility: CLINIC | Age: 40
End: 2025-05-22
Payer: COMMERCIAL

## 2025-05-22 DIAGNOSIS — Z3A.12 12 WEEKS GESTATION OF PREGNANCY: ICD-10-CM

## 2025-05-22 PROCEDURE — 99202 OFFICE O/P NEW SF 15 MIN: CPT | Mod: TH,95

## 2025-05-23 ENCOUNTER — APPOINTMENT (OUTPATIENT)
Dept: ANTEPARTUM | Facility: CLINIC | Age: 40
End: 2025-05-23
Payer: COMMERCIAL

## 2025-05-23 PROCEDURE — 36415 COLL VENOUS BLD VENIPUNCTURE: CPT

## 2025-05-28 ENCOUNTER — NON-APPOINTMENT (OUTPATIENT)
Age: 40
End: 2025-05-28

## 2025-05-28 ENCOUNTER — APPOINTMENT (OUTPATIENT)
Dept: SURGERY | Facility: CLINIC | Age: 40
End: 2025-05-28

## 2025-06-03 ENCOUNTER — NON-APPOINTMENT (OUTPATIENT)
Age: 40
End: 2025-06-03

## 2025-06-03 ENCOUNTER — APPOINTMENT (OUTPATIENT)
Dept: OBGYN | Facility: CLINIC | Age: 40
End: 2025-06-03
Payer: COMMERCIAL

## 2025-06-03 VITALS
BODY MASS INDEX: 37.73 KG/M2 | DIASTOLIC BLOOD PRESSURE: 70 MMHG | WEIGHT: 205 LBS | HEIGHT: 62 IN | SYSTOLIC BLOOD PRESSURE: 116 MMHG

## 2025-06-03 PROBLEM — Z3A.15 15 WEEKS GESTATION OF PREGNANCY: Status: ACTIVE | Noted: 2025-06-03

## 2025-06-03 LAB
APPEARANCE: CLEAR
BILIRUBIN URINE: NEGATIVE
BLOOD URINE: NEGATIVE
COLOR: YELLOW
GLUCOSE QUALITATIVE U: NEGATIVE
KETONES URINE: NEGATIVE
LEUKOCYTE ESTERASE URINE: NEGATIVE
NITRITE URINE: NEGATIVE
PH URINE: 6.5
PROTEIN URINE: ABNORMAL
SPECIFIC GRAVITY URINE: 1.02
UROBILINOGEN URINE: 0.2 (ref 0.2–?)

## 2025-06-03 PROCEDURE — 99213 OFFICE O/P EST LOW 20 MIN: CPT | Mod: TH

## 2025-06-11 PROBLEM — Z3A.15 15 WEEKS GESTATION OF PREGNANCY: Status: RESOLVED | Noted: 2025-06-03 | Resolved: 2025-06-11

## 2025-06-11 PROBLEM — Z34.92 SECOND TRIMESTER PREGNANCY: Status: RESOLVED | Noted: 2025-05-20 | Resolved: 2025-06-11

## 2025-06-11 PROBLEM — Z34.92 SECOND TRIMESTER PREGNANCY: Status: ACTIVE | Noted: 2025-06-11

## 2025-06-24 ENCOUNTER — APPOINTMENT (OUTPATIENT)
Dept: OBGYN | Facility: CLINIC | Age: 40
End: 2025-06-24
Payer: COMMERCIAL

## 2025-06-24 VITALS
BODY MASS INDEX: 37.46 KG/M2 | SYSTOLIC BLOOD PRESSURE: 112 MMHG | WEIGHT: 203.56 LBS | DIASTOLIC BLOOD PRESSURE: 76 MMHG | HEIGHT: 62 IN

## 2025-06-24 PROCEDURE — 99213 OFFICE O/P EST LOW 20 MIN: CPT | Mod: TH

## 2025-06-27 ENCOUNTER — NON-APPOINTMENT (OUTPATIENT)
Age: 40
End: 2025-06-27

## 2025-06-27 ENCOUNTER — APPOINTMENT (OUTPATIENT)
Dept: MATERNAL FETAL MEDICINE | Facility: CLINIC | Age: 40
End: 2025-06-27

## 2025-07-02 ENCOUNTER — ASOB RESULT (OUTPATIENT)
Age: 40
End: 2025-07-02

## 2025-07-02 ENCOUNTER — APPOINTMENT (OUTPATIENT)
Dept: ANTEPARTUM | Facility: CLINIC | Age: 40
End: 2025-07-02
Payer: COMMERCIAL

## 2025-07-02 PROCEDURE — 76817 TRANSVAGINAL US OBSTETRIC: CPT | Mod: 59

## 2025-07-02 PROCEDURE — 76811 OB US DETAILED SNGL FETUS: CPT

## 2025-07-03 ENCOUNTER — ASOB RESULT (OUTPATIENT)
Age: 40
End: 2025-07-03

## 2025-07-03 ENCOUNTER — APPOINTMENT (OUTPATIENT)
Dept: MATERNAL FETAL MEDICINE | Facility: CLINIC | Age: 40
End: 2025-07-03
Payer: COMMERCIAL

## 2025-07-03 PROCEDURE — 99213 OFFICE O/P EST LOW 20 MIN: CPT | Mod: TH,95

## 2025-07-15 ENCOUNTER — APPOINTMENT (OUTPATIENT)
Dept: OBGYN | Facility: CLINIC | Age: 40
End: 2025-07-15

## 2025-07-15 VITALS
HEIGHT: 62 IN | BODY MASS INDEX: 38.52 KG/M2 | DIASTOLIC BLOOD PRESSURE: 70 MMHG | WEIGHT: 209.3 LBS | SYSTOLIC BLOOD PRESSURE: 122 MMHG

## 2025-07-15 PROCEDURE — 99213 OFFICE O/P EST LOW 20 MIN: CPT | Mod: TH,25

## 2025-07-16 LAB
APPEARANCE: CLEAR
BILIRUBIN URINE: NEGATIVE
BLOOD URINE: NEGATIVE
COLOR: YELLOW
GLUCOSE QUALITATIVE U: NEGATIVE
KETONES URINE: ABNORMAL
LEUKOCYTE ESTERASE URINE: NEGATIVE
NITRITE URINE: NEGATIVE
PH URINE: 6
PROTEIN URINE: NEGATIVE
SPECIFIC GRAVITY URINE: 1.02
UROBILINOGEN URINE: 0.2 (ref 0.2–?)

## 2025-07-23 DIAGNOSIS — Z3A.18 18 WEEKS GESTATION OF PREGNANCY: ICD-10-CM

## 2025-07-23 DIAGNOSIS — Z86.39 PERSONAL HISTORY OF OTHER ENDOCRINE, NUTRITIONAL AND METABOLIC DISEASE: ICD-10-CM

## 2025-07-23 DIAGNOSIS — Z13.71 ENCOUNTER FOR NONPROCREATIVE SCREENING FOR GENETIC DISEASE CARRIER STATUS: ICD-10-CM

## 2025-07-23 DIAGNOSIS — Z3A.21 21 WEEKS GESTATION OF PREGNANCY: ICD-10-CM

## 2025-07-23 DIAGNOSIS — Z34.90 ENCOUNTER FOR SUPERVISION OF NORMAL PREGNANCY, UNSPECIFIED, UNSPECIFIED TRIMESTER: ICD-10-CM

## 2025-08-06 ENCOUNTER — APPOINTMENT (OUTPATIENT)
Dept: OBGYN | Facility: CLINIC | Age: 40
End: 2025-08-06

## 2025-08-06 ENCOUNTER — NON-APPOINTMENT (OUTPATIENT)
Age: 40
End: 2025-08-06

## 2025-08-06 VITALS
WEIGHT: 213 LBS | HEIGHT: 62 IN | SYSTOLIC BLOOD PRESSURE: 108 MMHG | DIASTOLIC BLOOD PRESSURE: 72 MMHG | BODY MASS INDEX: 39.2 KG/M2

## 2025-08-06 DIAGNOSIS — Z3A.25 25 WEEKS GESTATION OF PREGNANCY: ICD-10-CM

## 2025-08-06 LAB
APPEARANCE: CLEAR
BILIRUBIN URINE: NEGATIVE
BLOOD URINE: ABNORMAL
COLOR: YELLOW
GLUCOSE QUALITATIVE U: NEGATIVE
KETONES URINE: NEGATIVE
LEUKOCYTE ESTERASE URINE: ABNORMAL
NITRITE URINE: NEGATIVE
PH URINE: 7.5
PROTEIN URINE: NEGATIVE
SPECIFIC GRAVITY URINE: 1.02
UROBILINOGEN URINE: 0.2 (ref 0.2–?)

## 2025-08-06 PROCEDURE — 99213 OFFICE O/P EST LOW 20 MIN: CPT | Mod: TH

## 2025-08-07 LAB
GLUCOSE 1H P 50 G GLC PO SERPL-MCNC: 144 MG/DL
HCT VFR BLD CALC: 41.2 %
HGB BLD-MCNC: 12.9 G/DL
MCHC RBC-ENTMCNC: 30.7 PG
MCHC RBC-ENTMCNC: 31.3 G/DL
MCV RBC AUTO: 98.1 FL
PLATELET # BLD AUTO: 319 K/UL
RBC # BLD: 4.2 M/UL
RBC # FLD: 14.2 %
WBC # FLD AUTO: 14.38 K/UL

## 2025-08-08 LAB — T PALLIDUM AB SER QL IA: NEGATIVE

## 2025-08-11 ENCOUNTER — NON-APPOINTMENT (OUTPATIENT)
Age: 40
End: 2025-08-11

## 2025-08-28 ENCOUNTER — APPOINTMENT (OUTPATIENT)
Dept: OBGYN | Facility: CLINIC | Age: 40
End: 2025-08-28
Payer: COMMERCIAL

## 2025-08-28 VITALS
DIASTOLIC BLOOD PRESSURE: 70 MMHG | WEIGHT: 218.56 LBS | BODY MASS INDEX: 40.22 KG/M2 | HEIGHT: 62 IN | SYSTOLIC BLOOD PRESSURE: 110 MMHG

## 2025-08-28 DIAGNOSIS — O99.810 ABNORMAL GLUCOSE COMPLICATING PREGNANCY: ICD-10-CM

## 2025-08-28 DIAGNOSIS — N76.0 ACUTE VAGINITIS: ICD-10-CM

## 2025-08-28 LAB
APPEARANCE: CLEAR
BILIRUBIN URINE: NEGATIVE
BLOOD URINE: ABNORMAL
COLOR: YELLOW
GLUCOSE QUALITATIVE U: NEGATIVE
KETONES URINE: ABNORMAL
LEUKOCYTE ESTERASE URINE: NEGATIVE
NITRITE URINE: NEGATIVE
PH URINE: 6.5
PROTEIN URINE: ABNORMAL
SPECIFIC GRAVITY URINE: 1.02
UROBILINOGEN URINE: 1 (ref 0.2–?)

## 2025-08-28 PROCEDURE — 99213 OFFICE O/P EST LOW 20 MIN: CPT | Mod: TH

## 2025-09-01 DIAGNOSIS — O23.599 INFECTION OF OTHER PART OF GENITAL TRACT IN PREGNANCY, UNSPECIFIED TRIMESTER: ICD-10-CM

## 2025-09-01 DIAGNOSIS — B96.89 INFECTION OF OTHER PART OF GENITAL TRACT IN PREGNANCY, UNSPECIFIED TRIMESTER: ICD-10-CM

## 2025-09-01 LAB
CANDIDA VAG CYTO: NOT DETECTED
G VAGINALIS+PREV SP MTYP VAG QL MICRO: DETECTED
T VAGINALIS VAG QL WET PREP: NOT DETECTED

## 2025-09-01 RX ORDER — METRONIDAZOLE 500 MG/1
500 TABLET ORAL TWICE DAILY
Qty: 14 | Refills: 0 | Status: ACTIVE | COMMUNITY
Start: 2025-09-01 | End: 1900-01-01

## 2025-09-10 ENCOUNTER — APPOINTMENT (OUTPATIENT)
Dept: ANTEPARTUM | Facility: CLINIC | Age: 40
End: 2025-09-10
Payer: COMMERCIAL

## 2025-09-10 ENCOUNTER — ASOB RESULT (OUTPATIENT)
Age: 40
End: 2025-09-10

## 2025-09-10 ENCOUNTER — APPOINTMENT (OUTPATIENT)
Dept: OBGYN | Facility: CLINIC | Age: 40
End: 2025-09-10

## 2025-09-10 VITALS
BODY MASS INDEX: 40.3 KG/M2 | HEIGHT: 62 IN | SYSTOLIC BLOOD PRESSURE: 122 MMHG | DIASTOLIC BLOOD PRESSURE: 80 MMHG | WEIGHT: 219 LBS

## 2025-09-10 DIAGNOSIS — Z3A.29 29 WEEKS GESTATION OF PREGNANCY: ICD-10-CM

## 2025-09-10 LAB
APPEARANCE: CLEAR
BILIRUBIN URINE: NEGATIVE
BLOOD URINE: NEGATIVE
COLOR: YELLOW
GLUCOSE QUALITATIVE U: NEGATIVE
KETONES URINE: NEGATIVE
LEUKOCYTE ESTERASE URINE: ABNORMAL
NITRITE URINE: NEGATIVE
PH URINE: 7
PROTEIN URINE: NEGATIVE
SPECIFIC GRAVITY URINE: 1.01
UROBILINOGEN URINE: 0.2 (ref 0.2–?)

## 2025-09-10 PROCEDURE — 76816 OB US FOLLOW-UP PER FETUS: CPT

## 2025-09-11 ENCOUNTER — NON-APPOINTMENT (OUTPATIENT)
Age: 40
End: 2025-09-11

## (undated) DEVICE — SOL IRR BAG H2O 1000ML

## (undated) DEVICE — TUBING AIRSEAL TRI-LUMEN FILTERED

## (undated) DEVICE — PREP CHLORAPREP HI-LITE ORANGE 26ML

## (undated) DEVICE — ELCTR GROUNDING PAD ADULT COVIDIEN

## (undated) DEVICE — SYR CATH TIP 2 OZ

## (undated) DEVICE — DRSG MASTISOL

## (undated) DEVICE — SPECIMEN CONTAINER 100ML

## (undated) DEVICE — XI CORD MONOPOLAR CAUTERY (GREEN)

## (undated) DEVICE — TUBING IV EXTENSION MACRO W CLAVE 7"

## (undated) DEVICE — DRSG 2X2

## (undated) DEVICE — VENODYNE/SCD SLEEVE CALF MEDIUM

## (undated) DEVICE — NDL BIOPSY TROCAR TWO-PART 18G X 20CM

## (undated) DEVICE — POSITIONER SAGE MOBILITY TRANSFER PAD

## (undated) DEVICE — SYR LUER LOK 10CC

## (undated) DEVICE — TUBING LEVEL ONE NORMOFLO SET

## (undated) DEVICE — GLV 7.5 PROTEXIS (WHITE)

## (undated) DEVICE — FORCEP RADIAL JAW 4 W NDL 2.4MM 2.8MM 240CM ORANGE DISP

## (undated) DEVICE — BOTTLE STONE COLLECTION 8MM 140ML STRL

## (undated) DEVICE — GLV 7.5 PROTEXIS (BLUE)

## (undated) DEVICE — IRRIGATION TRAY W PISTON SYRINGE 60ML

## (undated) DEVICE — TUBING CONNECTING 6MM 20FT

## (undated) DEVICE — MASK PROCED EARLOOP 50/BX LRC COVID ADD

## (undated) DEVICE — GOWN TRIMAX LG

## (undated) DEVICE — WARMING BLANKET UPPER ADULT

## (undated) DEVICE — XI DRAPE COLUMN

## (undated) DEVICE — DRAPE NEPHROSCOPY 72X118"

## (undated) DEVICE — VISIGI 3D SLEEVE GASTRECTOMY 36FR

## (undated) DEVICE — TUBING ALARIS PUMP MODULE NON-DEHP

## (undated) DEVICE — AMPLATZ RENAL DILATOR 6FR-30FR X 20CM

## (undated) DEVICE — ELCTR BOVIE PENCIL SMOKE EVACUATION 15FT

## (undated) DEVICE — WARMING BLANKET LOWER ADULT

## (undated) DEVICE — PACK LITHOTOMY

## (undated) DEVICE — TAPE SILK 3"

## (undated) DEVICE — VISITEC 4X4

## (undated) DEVICE — DRAPE GENERAL ENDOSCOPY

## (undated) DEVICE — DRSG CURITY GAUZE SPONGE 4 X 4" 12-PLY NON-STERILE

## (undated) DEVICE — BLADE SURGICAL #10 CARBON

## (undated) DEVICE — SOL IRR BAG NS 0.9% 1000ML

## (undated) DEVICE — XI SEAL UNIVERSIAL 5-12MM

## (undated) DEVICE — LAP PAD W RING 18 X 18"

## (undated) DEVICE — SOL IRR POUR H2O 1000ML

## (undated) DEVICE — ENDOCATCH II 15MM

## (undated) DEVICE — SUT VICRYL 2-0 27" SH UNDYED

## (undated) DEVICE — XI STAPLER SUREFORM 60

## (undated) DEVICE — WARMING BLANKET FULL ADULT

## (undated) DEVICE — SYR IV FLUSH SALINE 10ML 30/TY

## (undated) DEVICE — SUT VICRYL 0 27" UR-6

## (undated) DEVICE — SOL BAG NS 0.9% 1000ML

## (undated) DEVICE — XI DRAPE ARM

## (undated) DEVICE — NDL HYPO SAFE 22G X 1.5" (BLACK)

## (undated) DEVICE — VALVE ENDO SURESEAL II 0-5FR

## (undated) DEVICE — GOWN IMPERV XL

## (undated) DEVICE — XI OBTURATOR OPTICAL BLADELESS 8MM

## (undated) DEVICE — PACK ROBOTIC

## (undated) DEVICE — INSUFFLATION NDL COVIDIEN STEP 14G FOR STEP/VERSASTEP

## (undated) DEVICE — VENODYNE/SCD SLEEVE CALF LARGE

## (undated) DEVICE — BITE BLOCK ADULT 20 X 27MM (GREEN)

## (undated) DEVICE — DRAPE TOWEL BLUE 17" X 24"

## (undated) DEVICE — TUBING TUR 2 PRONG

## (undated) DEVICE — SYR CONTROL LUER LOK 10CC

## (undated) DEVICE — DENTURE CUP PINK

## (undated) DEVICE — SSH-ERBE 26004501: Type: DURABLE MEDICAL EQUIPMENT

## (undated) DEVICE — SOL IRR POUR NS 0.9% 1000ML

## (undated) DEVICE — DRSG STERISTRIPS 0.5 X 4"

## (undated) DEVICE — APPLICATOR FOR TISSEEL DUPLOTIP W SNAP LOCK 5MMX32CM

## (undated) DEVICE — INFLATOR ENCORE 26

## (undated) DEVICE — XI ARM GRASPER TIP UP FENESTRATED

## (undated) DEVICE — DRAINAGE BAG NEPHROSTOMY URESIL TRU-CLOSE 600ML

## (undated) DEVICE — PACK IV START WITH CHG

## (undated) DEVICE — UNDERPAD LINEN SAVER 23 X 36"

## (undated) DEVICE — SYR LUER LOK 5CC

## (undated) DEVICE — LAP PAD 4 X 18"

## (undated) DEVICE — TROCAR SURGIQUEST AIRSEAL 5MM X 100MM

## (undated) DEVICE — SYR LUER SLIP TIP 50CC

## (undated) DEVICE — XI ARM FORCEP CADIERE 8MM

## (undated) DEVICE — SUT MONOCRYL 4-0 27" PS-2 UNDYED

## (undated) DEVICE — SYR SLIP 10CC

## (undated) DEVICE — BOSTON SCIENTIFC ENCORE 26 INFLATOR

## (undated) DEVICE — DRAPE 3/4 SHEET 52X76"

## (undated) DEVICE — CATH IV SAFE BC 22G X 1" (BLUE)

## (undated) DEVICE — STAPLER SKIN PROXIMATE

## (undated) DEVICE — IRR BULB PATHFINDER + 10"

## (undated) DEVICE — SENSOR O2 FINGER ADULT

## (undated) DEVICE — XI VESSEL SEALER

## (undated) DEVICE — POSITIONER FOAM EGG CRATE ULNAR 2PCS (PINK)

## (undated) DEVICE — XI CORD BIPOLAR CAUTERY (BLUE)